# Patient Record
Sex: FEMALE | Race: WHITE | NOT HISPANIC OR LATINO | URBAN - METROPOLITAN AREA
[De-identification: names, ages, dates, MRNs, and addresses within clinical notes are randomized per-mention and may not be internally consistent; named-entity substitution may affect disease eponyms.]

---

## 2019-06-07 ENCOUNTER — APPOINTMENT (RX ONLY)
Dept: URBAN - METROPOLITAN AREA OTHER 8 | Facility: OTHER | Age: 84
Setting detail: DERMATOLOGY
End: 2019-06-07

## 2019-06-07 DIAGNOSIS — D69.2 OTHER NONTHROMBOCYTOPENIC PURPURA: ICD-10-CM

## 2019-06-07 DIAGNOSIS — L90.5 SCAR CONDITIONS AND FIBROSIS OF SKIN: ICD-10-CM

## 2019-06-07 DIAGNOSIS — L57.0 ACTINIC KERATOSIS: ICD-10-CM

## 2019-06-07 DIAGNOSIS — L82.1 OTHER SEBORRHEIC KERATOSIS: ICD-10-CM

## 2019-06-07 DIAGNOSIS — D485 NEOPLASM OF UNCERTAIN BEHAVIOR OF SKIN: ICD-10-CM

## 2019-06-07 PROBLEM — L85.3 XEROSIS CUTIS: Status: ACTIVE | Noted: 2019-06-07

## 2019-06-07 PROBLEM — J45.909 UNSPECIFIED ASTHMA, UNCOMPLICATED: Status: ACTIVE | Noted: 2019-06-07

## 2019-06-07 PROBLEM — R23.3 SPONTANEOUS ECCHYMOSES: Status: ACTIVE | Noted: 2019-06-07

## 2019-06-07 PROBLEM — K21.9 GASTRO-ESOPHAGEAL REFLUX DISEASE WITHOUT ESOPHAGITIS: Status: ACTIVE | Noted: 2019-06-07

## 2019-06-07 PROBLEM — D48.5 NEOPLASM OF UNCERTAIN BEHAVIOR OF SKIN: Status: ACTIVE | Noted: 2019-06-07

## 2019-06-07 PROBLEM — E13.9 OTHER SPECIFIED DIABETES MELLITUS WITHOUT COMPLICATIONS: Status: ACTIVE | Noted: 2019-06-07

## 2019-06-07 PROCEDURE — 11102 TANGNTL BX SKIN SINGLE LES: CPT

## 2019-06-07 PROCEDURE — 99202 OFFICE O/P NEW SF 15 MIN: CPT | Mod: 25

## 2019-06-07 PROCEDURE — ? COUNSELING

## 2019-06-07 PROCEDURE — ? TREATMENT REGIMEN

## 2019-06-07 PROCEDURE — ? ADDITIONAL NOTES

## 2019-06-07 PROCEDURE — 11103 TANGNTL BX SKIN EA SEP/ADDL: CPT

## 2019-06-07 PROCEDURE — ? BIOPSY BY SHAVE METHOD

## 2019-06-07 ASSESSMENT — LOCATION SIMPLE DESCRIPTION DERM
LOCATION SIMPLE: LEFT FOREARM
LOCATION SIMPLE: LEFT UPPER ARM
LOCATION SIMPLE: NOSE
LOCATION SIMPLE: RIGHT FOREARM
LOCATION SIMPLE: CHEST

## 2019-06-07 ASSESSMENT — LOCATION DETAILED DESCRIPTION DERM
LOCATION DETAILED: RIGHT VENTRAL PROXIMAL FOREARM
LOCATION DETAILED: LEFT ANTERIOR PROXIMAL UPPER ARM
LOCATION DETAILED: STERNAL NOTCH
LOCATION DETAILED: NASAL ROOT
LOCATION DETAILED: LEFT DISTAL DORSAL FOREARM
LOCATION DETAILED: LEFT PROXIMAL DORSAL FOREARM

## 2019-06-07 ASSESSMENT — LOCATION ZONE DERM
LOCATION ZONE: TRUNK
LOCATION ZONE: NOSE
LOCATION ZONE: ARM

## 2019-06-07 NOTE — PROCEDURE: BIOPSY BY SHAVE METHOD
Bill 80197 For Specimen Handling/Conveyance To Laboratory?: no
Anesthesia Type: 1% lidocaine with epinephrine
Detail Level: Simple
Silver Nitrate Text: The wound bed was treated with silver nitrate after the biopsy was performed.
Curettage Text: The wound bed was treated with curettage after the biopsy was performed.
Additional Anesthesia Volume In Cc (Will Not Render If 0): 0
Was A Bandage Applied: Yes
Billing Type: Third-Party Bill
Post-Care Instructions: I reviewed with the patient in detail post-care instructions. Patient is to keep the biopsy site dry overnight, and then apply bacitracin twice daily until healed. Patient may apply hydrogen peroxide soaks to remove any crusting.
Wound Care: Vaseline
Cryotherapy Text: The wound bed was treated with cryotherapy after the biopsy was performed.
Biopsy Method: 15 blade
Lab: [id number]
Anesthesia Volume In Cc: 0.5
Notification Instructions: Patient will be notified of biopsy results. However, patient instructed to call the office if not contacted within 2 weeks.
Consent: Written consent was obtained and risks were reviewed including but not limited to scarring, infection, bleeding, scabbing, incomplete removal, nerve damage and allergy to anesthesia.
Lab Facility: [id number]
Size Of Lesion In Cm: 0.7
Electrodesiccation Text: The wound bed was treated with electrodesiccation after the biopsy was performed.
Type Of Destruction Used: Curettage
Depth Of Biopsy: dermis
Dressing: Band-Aid
Body Location Override (Optional - Billing Will Still Be Based On Selected Body Map Location If Applicable): left upper arm
Electrodesiccation And Curettage Text: The wound bed was treated with electrodesiccation and curettage after the biopsy was performed.
Hemostasis: Electrocautery
Biopsy Type: H and E
Path Notes Override (Will Replace All Of The Above Text): 2 areas biopsy in B LESION
Size Of Lesion In Cm: 1
Body Location Override (Optional - Billing Will Still Be Based On Selected Body Map Location If Applicable): left upper nose

## 2019-07-24 ENCOUNTER — APPOINTMENT (RX ONLY)
Dept: URBAN - METROPOLITAN AREA OTHER 8 | Facility: OTHER | Age: 84
Setting detail: DERMATOLOGY
End: 2019-07-24

## 2019-07-24 PROBLEM — D04.62 CARCINOMA IN SITU OF SKIN OF LEFT UPPER LIMB, INCLUDING SHOULDER: Status: ACTIVE | Noted: 2019-07-24

## 2019-07-24 PROCEDURE — 17262 DSTRJ MAL LES T/A/L 1.1-2.0: CPT

## 2019-07-24 PROCEDURE — ? CURETTAGE AND DESTRUCTION

## 2019-07-24 NOTE — PROCEDURE: CURETTAGE AND DESTRUCTION
Size Of Lesion After Curettage: 1.6
Biopsy Photograph Reviewed: Yes
Post-Care Instructions: I reviewed with the patient in detail post-care instructions. Patient is to keep the area dry for 48 hours, and not to engage in any swimming until the area is healed. Should the patient develop any fevers, chills, bleeding, severe pain patient will contact the office immediately.
Body Location Override (Optional - Billing Will Still Be Based On Selected Body Map Location If Applicable): left upper arm
Hide Accession Number?: No
Number Of Curettages: 3
Total Volume (Ccs): 1
Cautery Type: electrodesiccation
Bill As A Line Item Or As Units: Line Item
Detail Level: Detailed
Anesthesia Type: 1% lidocaine with 1:400,000 epinephrine
Size Of Lesion In Cm: 0.7
Consent was obtained from the patient. The risks, benefits and alternatives to therapy were discussed in detail. Specifically, the risks of infection, scarring, bleeding, prolonged wound healing, nerve injury, incomplete removal, allergy to anesthesia and recurrence were addressed. Alternatives to ED&C, such as: surgical removal and XRT were also discussed.  Prior to the procedure, the treatment site was clearly identified and confirmed by the patient. All components of Universal Protocol/PAUSE Rule completed.
Additional Information: (Optional): The wound was cleaned, and a pressure dressing was applied.  The patient received detailed post-op instructions.
What Was Performed First?: Curettage

## 2019-09-04 ENCOUNTER — APPOINTMENT (RX ONLY)
Dept: URBAN - METROPOLITAN AREA OTHER 8 | Facility: OTHER | Age: 84
Setting detail: DERMATOLOGY
End: 2019-09-04

## 2019-09-04 DIAGNOSIS — Z71.89 OTHER SPECIFIED COUNSELING: ICD-10-CM

## 2019-09-04 DIAGNOSIS — L85.3 XEROSIS CUTIS: ICD-10-CM

## 2019-09-04 DIAGNOSIS — L90.5 SCAR CONDITIONS AND FIBROSIS OF SKIN: ICD-10-CM | Status: RESOLVING

## 2019-09-04 DIAGNOSIS — L57.0 ACTINIC KERATOSIS: ICD-10-CM

## 2019-09-04 DIAGNOSIS — L82.1 OTHER SEBORRHEIC KERATOSIS: ICD-10-CM

## 2019-09-04 DIAGNOSIS — D69.2 OTHER NONTHROMBOCYTOPENIC PURPURA: ICD-10-CM

## 2019-09-04 PROCEDURE — ? LIQUID NITROGEN

## 2019-09-04 PROCEDURE — ? COUNSELING

## 2019-09-04 PROCEDURE — 99214 OFFICE O/P EST MOD 30 MIN: CPT | Mod: 25

## 2019-09-04 PROCEDURE — 17003 DESTRUCT PREMALG LES 2-14: CPT

## 2019-09-04 PROCEDURE — ? ADDITIONAL NOTES

## 2019-09-04 PROCEDURE — 17000 DESTRUCT PREMALG LESION: CPT

## 2019-09-04 PROCEDURE — ? PRESCRIPTION

## 2019-09-04 RX ORDER — FLUOROURACIL 50 MG/G
CREAM TOPICAL TWICE A DAY
Qty: 1 | Refills: 1 | Status: ERX | COMMUNITY
Start: 2019-09-04

## 2019-09-04 RX ADMIN — FLUOROURACIL: 50 CREAM TOPICAL at 00:00

## 2019-09-04 ASSESSMENT — LOCATION SIMPLE DESCRIPTION DERM
LOCATION SIMPLE: NOSE
LOCATION SIMPLE: CHEST
LOCATION SIMPLE: LEFT THIGH
LOCATION SIMPLE: RIGHT THIGH
LOCATION SIMPLE: LEFT UPPER BACK
LOCATION SIMPLE: RIGHT CALF
LOCATION SIMPLE: LEFT CHEEK
LOCATION SIMPLE: RIGHT PRETIBIAL REGION
LOCATION SIMPLE: LEFT CALF
LOCATION SIMPLE: LEFT PRETIBIAL REGION
LOCATION SIMPLE: LEFT POSTERIOR UPPER ARM

## 2019-09-04 ASSESSMENT — LOCATION ZONE DERM
LOCATION ZONE: LEG
LOCATION ZONE: TRUNK
LOCATION ZONE: ARM
LOCATION ZONE: NOSE
LOCATION ZONE: FACE

## 2019-09-04 ASSESSMENT — LOCATION DETAILED DESCRIPTION DERM
LOCATION DETAILED: RIGHT PROXIMAL CALF
LOCATION DETAILED: LEFT DISTAL CALF
LOCATION DETAILED: RIGHT ANTERIOR DISTAL THIGH
LOCATION DETAILED: LEFT INFERIOR UPPER BACK
LOCATION DETAILED: NASAL DORSUM
LOCATION DETAILED: MIDDLE STERNUM
LOCATION DETAILED: LEFT ANTERIOR DISTAL THIGH
LOCATION DETAILED: RIGHT PROXIMAL PRETIBIAL REGION
LOCATION DETAILED: LEFT PROXIMAL POSTERIOR UPPER ARM
LOCATION DETAILED: LEFT ANTERIOR PROXIMAL THIGH
LOCATION DETAILED: LEFT DISTAL PRETIBIAL REGION
LOCATION DETAILED: LEFT LATERAL BUCCAL CHEEK

## 2019-09-04 ASSESSMENT — PAIN INTENSITY VAS: HOW INTENSE IS YOUR PAIN 0 BEING NO PAIN, 10 BEING THE MOST SEVERE PAIN POSSIBLE?: NO PAIN

## 2019-09-04 NOTE — PROCEDURE: ADDITIONAL NOTES
Additional Notes: Pt to wait until after frozen spots heal.
Detail Level: Detailed
Additional Notes: Pt to wash site, apply Vaseline and cover site.

## 2019-09-04 NOTE — PROCEDURE: LIQUID NITROGEN
Number Of Freeze-Thaw Cycles: 2 freeze-thaw cycles
Post-Care Instructions: I reviewed with the patient in detail post-care instructions. Patient is to wear sunprotection, and avoid picking at any of the treated lesions. Pt may apply Vaseline to crusted or scabbing areas.
Render Note In Bullet Format When Appropriate: No
Detail Level: Simple
Duration Of Freeze Thaw-Cycle (Seconds): 10
Consent: The patient's consent was obtained including but not limited to risks of crusting, scabbing, blistering, scarring, darker or lighter pigmentary change, recurrence, incomplete removal and infection.

## 2020-10-28 ENCOUNTER — APPOINTMENT (RX ONLY)
Dept: URBAN - METROPOLITAN AREA OTHER 8 | Facility: OTHER | Age: 85
Setting detail: DERMATOLOGY
End: 2020-10-28

## 2020-10-28 VITALS — TEMPERATURE: 97.34 F

## 2020-10-28 DIAGNOSIS — L90.5 SCAR CONDITIONS AND FIBROSIS OF SKIN: ICD-10-CM

## 2020-10-28 DIAGNOSIS — D22 MELANOCYTIC NEVI: ICD-10-CM

## 2020-10-28 DIAGNOSIS — D485 NEOPLASM OF UNCERTAIN BEHAVIOR OF SKIN: ICD-10-CM

## 2020-10-28 DIAGNOSIS — L57.0 ACTINIC KERATOSIS: ICD-10-CM

## 2020-10-28 DIAGNOSIS — L82.1 OTHER SEBORRHEIC KERATOSIS: ICD-10-CM

## 2020-10-28 DIAGNOSIS — D69.2 OTHER NONTHROMBOCYTOPENIC PURPURA: ICD-10-CM

## 2020-10-28 PROBLEM — D22.5 MELANOCYTIC NEVI OF TRUNK: Status: ACTIVE | Noted: 2020-10-28

## 2020-10-28 PROBLEM — D48.5 NEOPLASM OF UNCERTAIN BEHAVIOR OF SKIN: Status: ACTIVE | Noted: 2020-10-28

## 2020-10-28 PROCEDURE — 17000 DESTRUCT PREMALG LESION: CPT | Mod: 59

## 2020-10-28 PROCEDURE — ? BIOPSY BY SHAVE METHOD

## 2020-10-28 PROCEDURE — 17003 DESTRUCT PREMALG LES 2-14: CPT

## 2020-10-28 PROCEDURE — ? LIQUID NITROGEN

## 2020-10-28 PROCEDURE — ? COUNSELING

## 2020-10-28 PROCEDURE — 11102 TANGNTL BX SKIN SINGLE LES: CPT

## 2020-10-28 PROCEDURE — 99214 OFFICE O/P EST MOD 30 MIN: CPT | Mod: 25

## 2020-10-28 ASSESSMENT — LOCATION DETAILED DESCRIPTION DERM
LOCATION DETAILED: RIGHT ANTERIOR DISTAL THIGH
LOCATION DETAILED: RIGHT ANTERIOR DISTAL UPPER ARM
LOCATION DETAILED: LEFT ANTERIOR PROXIMAL UPPER ARM
LOCATION DETAILED: LEFT PROXIMAL PRETIBIAL REGION
LOCATION DETAILED: RIGHT MEDIAL SUPERIOR CHEST
LOCATION DETAILED: LEFT PROXIMAL DORSAL FOREARM
LOCATION DETAILED: RIGHT LATERAL MALAR CHEEK
LOCATION DETAILED: RIGHT ANTERIOR PROXIMAL THIGH
LOCATION DETAILED: RIGHT INFERIOR LATERAL FOREHEAD
LOCATION DETAILED: LEFT VENTRAL PROXIMAL FOREARM
LOCATION DETAILED: LEFT INFERIOR UPPER BACK
LOCATION DETAILED: LEFT LATERAL SUPERIOR CHEST
LOCATION DETAILED: RIGHT PROXIMAL PRETIBIAL REGION
LOCATION DETAILED: NASAL DORSUM
LOCATION DETAILED: RIGHT MEDIAL UPPER BACK
LOCATION DETAILED: RIGHT PROXIMAL DORSAL FOREARM

## 2020-10-28 ASSESSMENT — LOCATION SIMPLE DESCRIPTION DERM
LOCATION SIMPLE: LEFT UPPER ARM
LOCATION SIMPLE: CHEST
LOCATION SIMPLE: RIGHT PRETIBIAL REGION
LOCATION SIMPLE: LEFT UPPER BACK
LOCATION SIMPLE: RIGHT FOREHEAD
LOCATION SIMPLE: RIGHT UPPER BACK
LOCATION SIMPLE: LEFT PRETIBIAL REGION
LOCATION SIMPLE: RIGHT UPPER ARM
LOCATION SIMPLE: RIGHT THIGH
LOCATION SIMPLE: RIGHT CHEEK
LOCATION SIMPLE: LEFT FOREARM
LOCATION SIMPLE: RIGHT FOREARM
LOCATION SIMPLE: NOSE

## 2020-10-28 ASSESSMENT — LOCATION ZONE DERM
LOCATION ZONE: LEG
LOCATION ZONE: ARM
LOCATION ZONE: FACE
LOCATION ZONE: TRUNK
LOCATION ZONE: NOSE

## 2020-10-28 NOTE — PROCEDURE: LIQUID NITROGEN
Number Of Freeze-Thaw Cycles: 1 freeze-thaw cycle
Post-Care Instructions: I reviewed with the patient in detail post-care instructions. Patient is to wear sunprotection, and avoid picking at any of the treated lesions. Pt may apply Vaseline to crusted or scabbing areas.
Render Note In Bullet Format When Appropriate: No
Duration Of Freeze Thaw-Cycle (Seconds): 15
Consent: The patient's consent was obtained including but not limited to risks of crusting, scabbing, blistering, scarring, darker or lighter pigmentary change, recurrence, incomplete removal and infection.
Detail Level: Detailed

## 2020-10-28 NOTE — PROCEDURE: BIOPSY BY SHAVE METHOD
Detail Level: Detailed
Depth Of Biopsy: dermis
Was A Bandage Applied: Yes
Size Of Lesion In Cm: 0
Biopsy Type: H and E
Biopsy Method: 15 blade
Anesthesia Type: 1% lidocaine with epinephrine
Anesthesia Volume In Cc: 1
Hemostasis: Electrocautery
Wound Care: Vaseline
Dressing: bandage
Destruction After The Procedure: No
Type Of Destruction Used: Curettage
Curettage Text: The wound bed was treated with curettage after the biopsy was performed.
Cryotherapy Text: The wound bed was treated with cryotherapy after the biopsy was performed.
Electrodesiccation Text: The wound bed was treated with electrodesiccation after the biopsy was performed.
Electrodesiccation And Curettage Text: The wound bed was treated with electrodesiccation and curettage after the biopsy was performed.
Silver Nitrate Text: The wound bed was treated with silver nitrate after the biopsy was performed.
Lab: [id number]
Lab Facility: [id number]
Consent: Written consent was obtained and risks were reviewed including but not limited to scarring, infection, bleeding, scabbing, incomplete removal, nerve damage and allergy to anesthesia.
Post-Care Instructions: I reviewed with the patient in detail post-care instructions. Patient is to keep the biopsy site dry overnight, and then apply bacitracin twice daily until healed. Patient may apply hydrogen peroxide soaks to remove any crusting.
Notification Instructions: Patient will be notified of biopsy results. However, patient instructed to call the office if not contacted within 2 weeks.
Billing Type: Third-Party Bill
Information: Selecting Yes will display possible errors in your note based on the variables you have selected. This validation is only offered as a suggestion for you. PLEASE NOTE THAT THE VALIDATION TEXT WILL BE REMOVED WHEN YOU FINALIZE YOUR NOTE. IF YOU WANT TO FAX A PRELIMINARY NOTE YOU WILL NEED TO TOGGLE THIS TO 'NO' IF YOU DO NOT WANT IT IN YOUR FAXED NOTE.

## 2020-12-01 ENCOUNTER — APPOINTMENT (RX ONLY)
Dept: URBAN - METROPOLITAN AREA OTHER 8 | Facility: OTHER | Age: 85
Setting detail: DERMATOLOGY
End: 2020-12-01

## 2020-12-01 VITALS — TEMPERATURE: 97.1 F

## 2020-12-01 PROBLEM — C44.729 SQUAMOUS CELL CARCINOMA OF SKIN OF LEFT LOWER LIMB, INCLUDING HIP: Status: ACTIVE | Noted: 2020-12-01

## 2020-12-01 PROCEDURE — ? PRESCRIPTION

## 2020-12-01 PROCEDURE — 17313 MOHS 1 STAGE T/A/L: CPT

## 2020-12-01 PROCEDURE — ? MOHS SURGERY

## 2020-12-01 PROCEDURE — 12032 INTMD RPR S/A/T/EXT 2.6-7.5: CPT

## 2020-12-01 RX ORDER — DOXYCYCLINE HYCLATE 100 MG/1
TABLET, COATED ORAL
Qty: 28 | Refills: 0 | Status: ERX | COMMUNITY
Start: 2020-12-01

## 2020-12-01 RX ADMIN — DOXYCYCLINE HYCLATE: 100 TABLET, COATED ORAL at 00:00

## 2020-12-01 NOTE — PROCEDURE: MOHS SURGERY
Mohs Case Number: 
Date Of Previous Biopsy (Optional): 10/28/20
Previous Accession (Optional): [id number]
Biopsy Photograph Reviewed: Yes
Consent Type: Consent 1 (Standard)
Eye Shield Used: No
Surgeon Performing Repair (Optional): Dr. Herron
Initial Size Of Lesion: 1.6
X Size Of Lesion In Cm (Optional): 1.7
Number Of Stages: 1
Primary Defect Length In Cm (Final Defect Size - Required For Flaps/Grafts): 3
Primary Defect Width In Cm (Final Defect Size - Required For Flaps/Grafts): 2.5
Repair Type: Intermediate Layered Repair
Oculoplastic Surgeon Procedure Text (A): After obtaining clear surgical margins the patient was sent to oculoplastics for surgical repair.  The patient understands they will receive post-surgical care and follow-up from the referring physician's office.
Otolaryngologist Procedure Text (A): After obtaining clear surgical margins the patient was sent to otolaryngology for surgical repair.  The patient understands they will receive post-surgical care and follow-up from the referring physician's office.
Plastic Surgeon Procedure Text (A): After obtaining clear surgical margins the patient was sent to plastics for surgical repair.  The patient understands they will receive post-surgical care and follow-up from the referring physician's office.
Mid-Level Procedure Text (A): After obtaining clear surgical margins the patient was sent to a mid-level provider for surgical repair.  The patient understands they will receive post-surgical care and follow-up from the mid-level provider.
Provider Procedure Text (A): After obtaining clear surgical margins the defect was repaired by another provider.
Asc Procedure Text (A): After obtaining clear surgical margins the patient was sent to an ASC for surgical repair.  The patient understands they will receive post-surgical care and follow-up from the ASC physician.
Suturegard Retention Suture: 2-0 Nylon
Retention Suture Bite Size: 3 mm
Length To Time In Minutes Device Was In Place: 10
Simple / Intermediate / Complex Repair - Final Wound Length In Cm: 6.3
Undermining Type: Entire Wound
Debridement Text: The wound edges were debrided prior to proceeding with the closure to facilitate wound healing.
Helical Rim Text: The closure involved the helical rim.
Vermilion Border Text: The closure involved the vermilion border.
Nostril Rim Text: The closure involved the nostril rim.
Retention Suture Text: Retention sutures were placed to support the closure and prevent dehiscence.
Secondary Defect Length In Cm (Required For Flaps): 0
Area H Indication Text: Tumors in this location are included in Area H (eyelids, eyebrows, nose, lips, chin, ear, pre-auricular, post-auricular, temple, genitalia, hands, feet, ankles and areola).  Tissue conservation is critical in these anatomic locations.
Area M Indication Text: Tumors in this location are included in Area M (cheek, forehead, scalp, neck, jawline and pretibial skin).  Mohs surgery is indicated for tumors in these anatomic locations.
Area L Indication Text: Tumors in this location are included in Area L (trunk and extremities).  Mohs surgery is indicated for larger tumors, or tumors with aggressive histologic features, in these anatomic locations.
Tumor Debulked?: scalpel
Stage 1: Number Of Blocks?: 2
Special Stains Stage 1 - Results: Base On Clearance Noted Above
Stage 2: Additional Anesthesia Type: 1% lidocaine with epinephrine
Staging Info: By selecting yes to the question above you will include information on AJCC 8 tumor staging in your Mohs note. Information on tumor staging will be automatically added for SCCs on the head and neck. AJCC 8 includes tumor size, tumor depth, perineural involvement and bone invasion.
Tumor Depth: Less than 6mm from granular layer and no invasion beyond the subcutaneous fat
Was The Patient On Physician Recommended Anticoagulation Therapy?: Please Select the Appropriate Response
Medical Necessity Statement: Based on my medical judgement, Mohs surgery is the most appropriate treatment for this cancer compared to other treatments.
Alternatives Discussed Intro (Do Not Add Period): I discussed alternative treatments to Mohs surgery and specifically discussed the risks and benefits of
Consent 1/Introductory Paragraph: The rationale for Mohs was explained to the patient and consent was obtained. The risks, benefits and alternatives to therapy were discussed in detail. Specifically, the risks of infection, scarring, bleeding, prolonged wound healing, incomplete removal, allergy to anesthesia, nerve injury and recurrence were addressed. Prior to the procedure, the treatment site was clearly identified and confirmed by the patient. All components of Universal Protocol/PAUSE Rule completed.
Consent 2/Introductory Paragraph: Mohs surgery was explained to the patient and consent was obtained. The risks, benefits and alternatives to therapy were discussed in detail. Specifically, the risks of infection, scarring, bleeding, prolonged wound healing, incomplete removal, allergy to anesthesia, nerve injury and recurrence were addressed. Prior to the procedure, the treatment site was clearly identified and confirmed by the patient. All components of Universal Protocol/PAUSE Rule completed.
Consent 3/Introductory Paragraph: I gave the patient a chance to ask questions they had about the procedure.  Following this I explained the Mohs procedure and consent was obtained. The risks, benefits and alternatives to therapy were discussed in detail. Specifically, the risks of infection, scarring, bleeding, prolonged wound healing, incomplete removal, allergy to anesthesia, nerve injury and recurrence were addressed. Prior to the procedure, the treatment site was clearly identified and confirmed by the patient. All components of Universal Protocol/PAUSE Rule completed.
Consent (Temporal Branch)/Introductory Paragraph: The rationale for Mohs was explained to the patient and consent was obtained. The risks, benefits and alternatives to therapy were discussed in detail. Specifically, the risks of damage to the temporal branch of the facial nerve, infection, scarring, bleeding, prolonged wound healing, incomplete removal, allergy to anesthesia, and recurrence were addressed. Prior to the procedure, the treatment site was clearly identified and confirmed by the patient. All components of Universal Protocol/PAUSE Rule completed.
Consent (Marginal Mandibular)/Introductory Paragraph: The rationale for Mohs was explained to the patient and consent was obtained. The risks, benefits and alternatives to therapy were discussed in detail. Specifically, the risks of damage to the marginal mandibular branch of the facial nerve, infection, scarring, bleeding, prolonged wound healing, incomplete removal, allergy to anesthesia, and recurrence were addressed. Prior to the procedure, the treatment site was clearly identified and confirmed by the patient. All components of Universal Protocol/PAUSE Rule completed.
Consent (Spinal Accessory)/Introductory Paragraph: The rationale for Mohs was explained to the patient and consent was obtained. The risks, benefits and alternatives to therapy were discussed in detail. Specifically, the risks of damage to the spinal accessory nerve, infection, scarring, bleeding, prolonged wound healing, incomplete removal, allergy to anesthesia, and recurrence were addressed. Prior to the procedure, the treatment site was clearly identified and confirmed by the patient. All components of Universal Protocol/PAUSE Rule completed.
Consent (Near Eyelid Margin)/Introductory Paragraph: The rationale for Mohs was explained to the patient and consent was obtained. The risks, benefits and alternatives to therapy were discussed in detail. Specifically, the risks of ectropion or eyelid deformity, infection, scarring, bleeding, prolonged wound healing, incomplete removal, allergy to anesthesia, nerve injury and recurrence were addressed. Prior to the procedure, the treatment site was clearly identified and confirmed by the patient. All components of Universal Protocol/PAUSE Rule completed.
Consent (Ear)/Introductory Paragraph: The rationale for Mohs was explained to the patient and consent was obtained. The risks, benefits and alternatives to therapy were discussed in detail. Specifically, the risks of ear deformity, infection, scarring, bleeding, prolonged wound healing, incomplete removal, allergy to anesthesia, nerve injury and recurrence were addressed. Prior to the procedure, the treatment site was clearly identified and confirmed by the patient. All components of Universal Protocol/PAUSE Rule completed.
Consent (Nose)/Introductory Paragraph: The rationale for Mohs was explained to the patient and consent was obtained. The risks, benefits and alternatives to therapy were discussed in detail. Specifically, the risks of nasal deformity, changes in the flow of air through the nose, infection, scarring, bleeding, prolonged wound healing, incomplete removal, allergy to anesthesia, nerve injury and recurrence were addressed. Prior to the procedure, the treatment site was clearly identified and confirmed by the patient. All components of Universal Protocol/PAUSE Rule completed.
Consent (Lip)/Introductory Paragraph: The rationale for Mohs was explained to the patient and consent was obtained. The risks, benefits and alternatives to therapy were discussed in detail. Specifically, the risks of lip deformity, changes in the oral aperture, infection, scarring, bleeding, prolonged wound healing, incomplete removal, allergy to anesthesia, nerve injury and recurrence were addressed. Prior to the procedure, the treatment site was clearly identified and confirmed by the patient. All components of Universal Protocol/PAUSE Rule completed.
Consent (Scalp)/Introductory Paragraph: The rationale for Mohs was explained to the patient and consent was obtained. The risks, benefits and alternatives to therapy were discussed in detail. Specifically, the risks of changes in hair growth pattern secondary to repair, infection, scarring, bleeding, prolonged wound healing, incomplete removal, allergy to anesthesia, nerve injury and recurrence were addressed. Prior to the procedure, the treatment site was clearly identified and confirmed by the patient. All components of Universal Protocol/PAUSE Rule completed.
Detail Level: Detailed
Postop Diagnosis: same
Hemostasis: Electrodesiccation
Estimated Blood Loss (Cc): minimal
Repair Anesthesia Method: local infiltration
Deep Sutures: 4-0 Vicryl
Epidermal Sutures: 4-0 Ethilon
Epidermal Closure: simple interrupted
Suturegard Intro: Intraoperative tissue expansion was performed, utilizing the SUTUREGARD device, in order to reduce wound tension.
Suturegard Body: The suture ends were repeatedly re-tightened and re-clamped to achieve the desired tissue expansion.
Hemigard Intro: Due to skin fragility and wound tension, it was decided to use HEMIGARD adhesive retention suture devices to permit a linear closure. The skin was cleaned and dried for a 6cm distance away from the wound. Excessive hair, if present, was removed to allow for adhesion.
Hemigard Postcare Instructions: The HEMIGARD strips are to remain completely dry for at least 5-7 days.
Donor Site Anesthesia Type: same as repair anesthesia
Graft Donor Site Bandage (Optional-Leave Blank If You Don't Want In Note): Steri-strips and a pressure bandage were applied to the donor site.
Closure 2 Information: This tab is for additional flaps and grafts, including complex repair and grafts and complex repair and flaps. You can also specify a different location for the additional defect, if the location is the same you do not need to select a new one. We will insert the automated text for the repair you select below just as we do for solitary flaps and grafts. Please note that at this time if you select a location with a different insurance zone you will need to override the ICD10 and CPT if appropriate.
Closure 3 Information: This tab is for additional flaps and grafts above and beyond our usual structured repairs.  Please note if you enter information here it will not currently bill and you will need to add the billing information manually.
Wound Care: Petrolatum
Dressing: pressure dressing with telfa
Suture Removal: 14 days
Unna Boot Text: An Unna boot was placed to help immobilize the limb and facilitate more rapid healing.
Home Suture Removal Text: Patient was provided instructions on removing sutures and will remove their sutures at home.  If they have any questions or difficulties they will call the office.
Post-Care Instructions: I reviewed with the patient in detail post-care instructions. Patient is not to engage in any heavy lifting, exercise, or swimming for the next 14 days. Should the patient develop any fevers, chills, bleeding, severe pain patient will contact the office immediately.
Pain Refusal Text: I offered to prescribe pain medication but the patient refused to take this medication.
Mauc Instructions: By selecting yes to the question below the MAUC number will be added into the note.  This will be calculated automatically based on the diagnosis chosen, the size entered, the body zone selected (H,M,L) and the specific indications you chose. You will also have the option to override the Mohs AUC if you disagree with the automatically calculated number and this option is found in the Case Summary tab.
Where Do You Want The Question To Include Opioid Counseling Located?: Case Summary Tab
Eye Protection Verbiage: Before proceeding with the stage, a plastic scleral shield was inserted. The globe was anesthetized with a few drops of 1% lidocaine with 1:100,000 epinephrine. Then, an appropriate sized scleral shield was chosen and coated with lacrilube ointment. The shield was gently inserted and left in place for the duration of each stage. After the stage was completed, the shield was gently removed.
Mohs Method Verbiage: An incision at a 45 degree angle following the standard Mohs approach was done and the specimen was harvested as a microscopic controlled layer.
Surgeon/Pathologist Verbiage (Will Incorporate Name Of Surgeon From Intro If Not Blank): operated in two distinct and integrated capacities as the surgeon and pathologist.
Mohs Histo Method Verbiage: Each section was then chromacoded and processed in the Mohs lab using the Mohs protocol and submitted for frozen section.
Subsequent Stages Histo Method Verbiage: Using a similar technique to that described above, a thin layer of tissue was removed from all areas where tumor was visible on the previous stage.  The tissue was again oriented, mapped, dyed, and processed as above.
Mohs Rapid Report Verbiage: The area of clinically evident tumor was marked with skin marking ink and appropriately hatched.  The initial incision was made following the Mohs approach through the skin.  The specimen was taken to the lab, divided into the necessary number of pieces, chromacoded and processed according to the Mohs protocol.  This was repeated in successive stages until a tumor free defect was achieved.
Complex Repair Preamble Text (Leave Blank If You Do Not Want): Extensive wide undermining was performed.
Intermediate Repair Preamble Text (Leave Blank If You Do Not Want): Undermining was performed with blunt dissection.
Non-Graft Cartilage Fenestration Text: The cartilage was fenestrated with a 2mm punch biopsy to help facilitate healing.
Graft Cartilage Fenestration Text: The cartilage was fenestrated with a 2mm punch biopsy to help facilitate graft survival and healing.
Secondary Intention Text (Leave Blank If You Do Not Want): The defect will heal with secondary intention.
No Repair - Repaired With Adjacent Surgical Defect Text (Leave Blank If You Do Not Want): After obtaining clear surgical margins the defect was repaired concurrently with another surgical defect which was in close approximation.
Advancement Flap (Single) Text: The defect edges were debeveled with a #15 scalpel blade.  Given the location of the defect and the proximity to free margins a single advancement flap was deemed most appropriate.  Using a sterile surgical marker, an appropriate advancement flap was drawn incorporating the defect and placing the expected incisions within the relaxed skin tension lines where possible.    The area thus outlined was incised deep to adipose tissue with a #15 scalpel blade.  The skin margins were undermined to an appropriate distance in all directions utilizing iris scissors.
Advancement Flap (Double) Text: The defect edges were debeveled with a #15 scalpel blade.  Given the location of the defect and the proximity to free margins a double advancement flap was deemed most appropriate.  Using a sterile surgical marker, the appropriate advancement flaps were drawn incorporating the defect and placing the expected incisions within the relaxed skin tension lines where possible.    The area thus outlined was incised deep to adipose tissue with a #15 scalpel blade.  The skin margins were undermined to an appropriate distance in all directions utilizing iris scissors.
Burow's Advancement Flap Text: The defect edges were debeveled with a #15 scalpel blade.  Given the location of the defect and the proximity to free margins a Burow's advancement flap was deemed most appropriate.  Using a sterile surgical marker, the appropriate advancement flap was drawn incorporating the defect and placing the expected incisions within the relaxed skin tension lines where possible.    The area thus outlined was incised deep to adipose tissue with a #15 scalpel blade.  The skin margins were undermined to an appropriate distance in all directions utilizing iris scissors.
Chonodrocutaneous Helical Advancement Flap Text: The defect edges were debeveled with a #15 scalpel blade.  Given the location of the defect and the proximity to free margins a chondrocutaneous helical advancement flap was deemed most appropriate.  Using a sterile surgical marker, the appropriate advancement flap was drawn incorporating the defect and placing the expected incisions within the relaxed skin tension lines where possible.    The area thus outlined was incised deep to adipose tissue with a #15 scalpel blade.  The skin margins were undermined to an appropriate distance in all directions utilizing iris scissors.
Crescentic Advancement Flap Text: The defect edges were debeveled with a #15 scalpel blade.  Given the location of the defect and the proximity to free margins a crescentic advancement flap was deemed most appropriate.  Using a sterile surgical marker, the appropriate advancement flap was drawn incorporating the defect and placing the expected incisions within the relaxed skin tension lines where possible.    The area thus outlined was incised deep to adipose tissue with a #15 scalpel blade.  The skin margins were undermined to an appropriate distance in all directions utilizing iris scissors.
A-T Advancement Flap Text: The defect edges were debeveled with a #15 scalpel blade.  Given the location of the defect, shape of the defect and the proximity to free margins an A-T advancement flap was deemed most appropriate.  Using a sterile surgical marker, an appropriate advancement flap was drawn incorporating the defect and placing the expected incisions within the relaxed skin tension lines where possible.    The area thus outlined was incised deep to adipose tissue with a #15 scalpel blade.  The skin margins were undermined to an appropriate distance in all directions utilizing iris scissors.
O-T Advancement Flap Text: The defect edges were debeveled with a #15 scalpel blade.  Given the location of the defect, shape of the defect and the proximity to free margins an O-T advancement flap was deemed most appropriate.  Using a sterile surgical marker, an appropriate advancement flap was drawn incorporating the defect and placing the expected incisions within the relaxed skin tension lines where possible.    The area thus outlined was incised deep to adipose tissue with a #15 scalpel blade.  The skin margins were undermined to an appropriate distance in all directions utilizing iris scissors.
O-L Flap Text: The defect edges were debeveled with a #15 scalpel blade.  Given the location of the defect, shape of the defect and the proximity to free margins an O-L flap was deemed most appropriate.  Using a sterile surgical marker, an appropriate advancement flap was drawn incorporating the defect and placing the expected incisions within the relaxed skin tension lines where possible.    The area thus outlined was incised deep to adipose tissue with a #15 scalpel blade.  The skin margins were undermined to an appropriate distance in all directions utilizing iris scissors.
O-Z Flap Text: The defect edges were debeveled with a #15 scalpel blade.  Given the location of the defect, shape of the defect and the proximity to free margins an O-Z flap was deemed most appropriate.  Using a sterile surgical marker, an appropriate transposition flap was drawn incorporating the defect and placing the expected incisions within the relaxed skin tension lines where possible. The area thus outlined was incised deep to adipose tissue with a #15 scalpel blade.  The skin margins were undermined to an appropriate distance in all directions utilizing iris scissors.
Double O-Z Flap Text: The defect edges were debeveled with a #15 scalpel blade.  Given the location of the defect, shape of the defect and the proximity to free margins a Double O-Z flap was deemed most appropriate.  Using a sterile surgical marker, an appropriate transposition flap was drawn incorporating the defect and placing the expected incisions within the relaxed skin tension lines where possible. The area thus outlined was incised deep to adipose tissue with a #15 scalpel blade.  The skin margins were undermined to an appropriate distance in all directions utilizing iris scissors.
V-Y Flap Text: The defect edges were debeveled with a #15 scalpel blade.  Given the location of the defect, shape of the defect and the proximity to free margins a V-Y flap was deemed most appropriate.  Using a sterile surgical marker, an appropriate advancement flap was drawn incorporating the defect and placing the expected incisions within the relaxed skin tension lines where possible.    The area thus outlined was incised deep to adipose tissue with a #15 scalpel blade.  The skin margins were undermined to an appropriate distance in all directions utilizing iris scissors.
Advancement-Rotation Flap Text: The defect edges were debeveled with a #15 scalpel blade.  Given the location of the defect, shape of the defect and the proximity to free margins an advancement-rotation flap was deemed most appropriate.  Using a sterile surgical marker, an appropriate flap was drawn incorporating the defect and placing the expected incisions within the relaxed skin tension lines where possible. The area thus outlined was incised deep to adipose tissue with a #15 scalpel blade.  The skin margins were undermined to an appropriate distance in all directions utilizing iris scissors.
Mercedes Flap Text: The defect edges were debeveled with a #15 scalpel blade.  Given the location of the defect, shape of the defect and the proximity to free margins a Mercedes flap was deemed most appropriate.  Using a sterile surgical marker, an appropriate advancement flap was drawn incorporating the defect and placing the expected incisions within the relaxed skin tension lines where possible. The area thus outlined was incised deep to adipose tissue with a #15 scalpel blade.  The skin margins were undermined to an appropriate distance in all directions utilizing iris scissors.
Modified Advancement Flap Text: The defect edges were debeveled with a #15 scalpel blade.  Given the location of the defect, shape of the defect and the proximity to free margins a modified advancement flap was deemed most appropriate.  Using a sterile surgical marker, an appropriate advancement flap was drawn incorporating the defect and placing the expected incisions within the relaxed skin tension lines where possible.    The area thus outlined was incised deep to adipose tissue with a #15 scalpel blade.  The skin margins were undermined to an appropriate distance in all directions utilizing iris scissors.
Mucosal Advancement Flap Text: Given the location of the defect, shape of the defect and the proximity to free margins a mucosal advancement flap was deemed most appropriate. Incisions were made with a 15 blade scalpel in the appropriate fashion along the cutaneous vermilion border and the mucosal lip. The remaining actinically damaged mucosal tissue was excised.  The mucosal advancement flap was then elevated to the gingival sulcus with care taken to preserve the neurovascular structures and advanced into the primary defect. Care was taken to ensure that precise realignment of the vermilion border was achieved.
Peng Advancement Flap Text: The defect edges were debeveled with a #15 scalpel blade.  Given the location of the defect, shape of the defect and the proximity to free margins a Peng advancement flap was deemed most appropriate.  Using a sterile surgical marker, an appropriate advancement flap was drawn incorporating the defect and placing the expected incisions within the relaxed skin tension lines where possible. The area thus outlined was incised deep to adipose tissue with a #15 scalpel blade.  The skin margins were undermined to an appropriate distance in all directions utilizing iris scissors.
Hatchet Flap Text: The defect edges were debeveled with a #15 scalpel blade.  Given the location of the defect, shape of the defect and the proximity to free margins a hatchet flap was deemed most appropriate.  Using a sterile surgical marker, an appropriate hatchet flap was drawn incorporating the defect and placing the expected incisions within the relaxed skin tension lines where possible.    The area thus outlined was incised deep to adipose tissue with a #15 scalpel blade.  The skin margins were undermined to an appropriate distance in all directions utilizing iris scissors.
Rotation Flap Text: The defect edges were debeveled with a #15 scalpel blade.  Given the location of the defect, shape of the defect and the proximity to free margins a rotation flap was deemed most appropriate.  Using a sterile surgical marker, an appropriate rotation flap was drawn incorporating the defect and placing the expected incisions within the relaxed skin tension lines where possible.    The area thus outlined was incised deep to adipose tissue with a #15 scalpel blade.  The skin margins were undermined to an appropriate distance in all directions utilizing iris scissors.
Spiral Flap Text: The defect edges were debeveled with a #15 scalpel blade.  Given the location of the defect, shape of the defect and the proximity to free margins a spiral flap was deemed most appropriate.  Using a sterile surgical marker, an appropriate rotation flap was drawn incorporating the defect and placing the expected incisions within the relaxed skin tension lines where possible. The area thus outlined was incised deep to adipose tissue with a #15 scalpel blade.  The skin margins were undermined to an appropriate distance in all directions utilizing iris scissors.
Star Wedge Flap Text: The defect edges were debeveled with a #15 scalpel blade.  Given the location of the defect, shape of the defect and the proximity to free margins a star wedge flap was deemed most appropriate.  Using a sterile surgical marker, an appropriate rotation flap was drawn incorporating the defect and placing the expected incisions within the relaxed skin tension lines where possible. The area thus outlined was incised deep to adipose tissue with a #15 scalpel blade.  The skin margins were undermined to an appropriate distance in all directions utilizing iris scissors.
Transposition Flap Text: The defect edges were debeveled with a #15 scalpel blade.  Given the location of the defect and the proximity to free margins a transposition flap was deemed most appropriate.  Using a sterile surgical marker, an appropriate transposition flap was drawn incorporating the defect.    The area thus outlined was incised deep to adipose tissue with a #15 scalpel blade.  The skin margins were undermined to an appropriate distance in all directions utilizing iris scissors.
Muscle Hinge Flap Text: The defect edges were debeveled with a #15 scalpel blade.  Given the size, depth and location of the defect and the proximity to free margins a muscle hinge flap was deemed most appropriate.  Using a sterile surgical marker, an appropriate hinge flap was drawn incorporating the defect. The area thus outlined was incised with a #15 scalpel blade.  The skin margins were undermined to an appropriate distance in all directions utilizing iris scissors.
Melolabial Transposition Flap Text: The defect edges were debeveled with a #15 scalpel blade.  Given the location of the defect and the proximity to free margins a melolabial flap was deemed most appropriate.  Using a sterile surgical marker, an appropriate melolabial transposition flap was drawn incorporating the defect.    The area thus outlined was incised deep to adipose tissue with a #15 scalpel blade.  The skin margins were undermined to an appropriate distance in all directions utilizing iris scissors.
Rhombic Flap Text: The defect edges were debeveled with a #15 scalpel blade.  Given the location of the defect and the proximity to free margins a rhombic flap was deemed most appropriate.  Using a sterile surgical marker, an appropriate rhombic flap was drawn incorporating the defect.    The area thus outlined was incised deep to adipose tissue with a #15 scalpel blade.  The skin margins were undermined to an appropriate distance in all directions utilizing iris scissors.
Rhomboid Transposition Flap Text: The defect edges were debeveled with a #15 scalpel blade.  Given the location of the defect and the proximity to free margins a rhomboid transposition flap was deemed most appropriate.  Using a sterile surgical marker, an appropriate rhomboid flap was drawn incorporating the defect.    The area thus outlined was incised deep to adipose tissue with a #15 scalpel blade.  The skin margins were undermined to an appropriate distance in all directions utilizing iris scissors.
Bi-Rhombic Flap Text: The defect edges were debeveled with a #15 scalpel blade.  Given the location of the defect and the proximity to free margins a bi-rhombic flap was deemed most appropriate.  Using a sterile surgical marker, an appropriate rhombic flap was drawn incorporating the defect. The area thus outlined was incised deep to adipose tissue with a #15 scalpel blade.  The skin margins were undermined to an appropriate distance in all directions utilizing iris scissors.
Helical Rim Advancement Flap Text: The defect edges were debeveled with a #15 blade scalpel.  Given the location of the defect and the proximity to free margins (helical rim) a double helical rim advancement flap was deemed most appropriate.  Using a sterile surgical marker, the appropriate advancement flaps were drawn incorporating the defect and placing the expected incisions between the helical rim and antihelix where possible.  The area thus outlined was incised through and through with a #15 scalpel blade.  With a skin hook and iris scissors, the flaps were gently and sharply undermined and freed up.
Bilateral Helical Rim Advancement Flap Text: The defect edges were debeveled with a #15 blade scalpel.  Given the location of the defect and the proximity to free margins (helical rim) a bilateral helical rim advancement flap was deemed most appropriate.  Using a sterile surgical marker, the appropriate advancement flaps were drawn incorporating the defect and placing the expected incisions between the helical rim and antihelix where possible.  The area thus outlined was incised through and through with a #15 scalpel blade.  With a skin hook and iris scissors, the flaps were gently and sharply undermined and freed up.
Ear Star Wedge Flap Text: The defect edges were debeveled with a #15 blade scalpel.  Given the location of the defect and the proximity to free margins (helical rim) an ear star wedge flap was deemed most appropriate.  Using a sterile surgical marker, the appropriate flap was drawn incorporating the defect and placing the expected incisions between the helical rim and antihelix where possible.  The area thus outlined was incised through and through with a #15 scalpel blade.
Banner Transposition Flap Text: The defect edges were debeveled with a #15 scalpel blade.  Given the location of the defect and the proximity to free margins a Banner transposition flap was deemed most appropriate.  Using a sterile surgical marker, an appropriate flap drawn around the defect. The area thus outlined was incised deep to adipose tissue with a #15 scalpel blade.  The skin margins were undermined to an appropriate distance in all directions utilizing iris scissors.
Bilobed Flap Text: The defect edges were debeveled with a #15 scalpel blade.  Given the location of the defect and the proximity to free margins a bilobe flap was deemed most appropriate.  Using a sterile surgical marker, an appropriate bilobe flap drawn around the defect.    The area thus outlined was incised deep to adipose tissue with a #15 scalpel blade.  The skin margins were undermined to an appropriate distance in all directions utilizing iris scissors.
Bilobed Transposition Flap Text: The defect edges were debeveled with a #15 scalpel blade.  Given the location of the defect and the proximity to free margins a bilobed transposition flap was deemed most appropriate.  Using a sterile surgical marker, an appropriate bilobe flap drawn around the defect.    The area thus outlined was incised deep to adipose tissue with a #15 scalpel blade.  The skin margins were undermined to an appropriate distance in all directions utilizing iris scissors.
Trilobed Flap Text: The defect edges were debeveled with a #15 scalpel blade.  Given the location of the defect and the proximity to free margins a trilobed flap was deemed most appropriate.  Using a sterile surgical marker, an appropriate trilobed flap drawn around the defect.    The area thus outlined was incised deep to adipose tissue with a #15 scalpel blade.  The skin margins were undermined to an appropriate distance in all directions utilizing iris scissors.
Dorsal Nasal Flap Text: The defect edges were debeveled with a #15 scalpel blade.  Given the location of the defect and the proximity to free margins a dorsal nasal flap was deemed most appropriate.  Using a sterile surgical marker, an appropriate dorsal nasal flap was drawn around the defect.    The area thus outlined was incised deep to adipose tissue with a #15 scalpel blade.  The skin margins were undermined to an appropriate distance in all directions utilizing iris scissors.
Island Pedicle Flap Text: The defect edges were debeveled with a #15 scalpel blade.  Given the location of the defect, shape of the defect and the proximity to free margins an island pedicle advancement flap was deemed most appropriate.  Using a sterile surgical marker, an appropriate advancement flap was drawn incorporating the defect, outlining the appropriate donor tissue and placing the expected incisions within the relaxed skin tension lines where possible.    The area thus outlined was incised deep to adipose tissue with a #15 scalpel blade.  The skin margins were undermined to an appropriate distance in all directions around the primary defect and laterally outward around the island pedicle utilizing iris scissors.  There was minimal undermining beneath the pedicle flap.
Island Pedicle Flap With Canthal Suspension Text: The defect edges were debeveled with a #15 scalpel blade.  Given the location of the defect, shape of the defect and the proximity to free margins an island pedicle advancement flap was deemed most appropriate.  Using a sterile surgical marker, an appropriate advancement flap was drawn incorporating the defect, outlining the appropriate donor tissue and placing the expected incisions within the relaxed skin tension lines where possible. The area thus outlined was incised deep to adipose tissue with a #15 scalpel blade.  The skin margins were undermined to an appropriate distance in all directions around the primary defect and laterally outward around the island pedicle utilizing iris scissors.  There was minimal undermining beneath the pedicle flap. A suspension suture was placed in the canthal tendon to prevent tension and prevent ectropion.
Alar Island Pedicle Flap Text: The defect edges were debeveled with a #15 scalpel blade.  Given the location of the defect, shape of the defect and the proximity to the alar rim an island pedicle advancement flap was deemed most appropriate.  Using a sterile surgical marker, an appropriate advancement flap was drawn incorporating the defect, outlining the appropriate donor tissue and placing the expected incisions within the nasal ala running parallel to the alar rim. The area thus outlined was incised with a #15 scalpel blade.  The skin margins were undermined minimally to an appropriate distance in all directions around the primary defect and laterally outward around the island pedicle utilizing iris scissors.  There was minimal undermining beneath the pedicle flap.
Double Island Pedicle Flap Text: The defect edges were debeveled with a #15 scalpel blade.  Given the location of the defect, shape of the defect and the proximity to free margins a double island pedicle advancement flap was deemed most appropriate.  Using a sterile surgical marker, an appropriate advancement flap was drawn incorporating the defect, outlining the appropriate donor tissue and placing the expected incisions within the relaxed skin tension lines where possible.    The area thus outlined was incised deep to adipose tissue with a #15 scalpel blade.  The skin margins were undermined to an appropriate distance in all directions around the primary defect and laterally outward around the island pedicle utilizing iris scissors.  There was minimal undermining beneath the pedicle flap.
Island Pedicle Flap-Requiring Vessel Identification Text: The defect edges were debeveled with a #15 scalpel blade.  Given the location of the defect, shape of the defect and the proximity to free margins an island pedicle advancement flap was deemed most appropriate.  Using a sterile surgical marker, an appropriate advancement flap was drawn, based on the axial vessel mentioned above, incorporating the defect, outlining the appropriate donor tissue and placing the expected incisions within the relaxed skin tension lines where possible.    The area thus outlined was incised deep to adipose tissue with a #15 scalpel blade.  The skin margins were undermined to an appropriate distance in all directions around the primary defect and laterally outward around the island pedicle utilizing iris scissors.  There was minimal undermining beneath the pedicle flap.
Keystone Flap Text: The defect edges were debeveled with a #15 scalpel blade.  Given the location of the defect, shape of the defect a keystone flap was deemed most appropriate.  Using a sterile surgical marker, an appropriate keystone flap was drawn incorporating the defect, outlining the appropriate donor tissue and placing the expected incisions within the relaxed skin tension lines where possible. The area thus outlined was incised deep to adipose tissue with a #15 scalpel blade.  The skin margins were undermined to an appropriate distance in all directions around the primary defect and laterally outward around the flap utilizing iris scissors.
O-T Plasty Text: The defect edges were debeveled with a #15 scalpel blade.  Given the location of the defect, shape of the defect and the proximity to free margins an O-T plasty was deemed most appropriate.  Using a sterile surgical marker, an appropriate O-T plasty was drawn incorporating the defect and placing the expected incisions within the relaxed skin tension lines where possible.    The area thus outlined was incised deep to adipose tissue with a #15 scalpel blade.  The skin margins were undermined to an appropriate distance in all directions utilizing iris scissors.
O-Z Plasty Text: The defect edges were debeveled with a #15 scalpel blade.  Given the location of the defect, shape of the defect and the proximity to free margins an O-Z plasty (double transposition flap) was deemed most appropriate.  Using a sterile surgical marker, the appropriate transposition flaps were drawn incorporating the defect and placing the expected incisions within the relaxed skin tension lines where possible.    The area thus outlined was incised deep to adipose tissue with a #15 scalpel blade.  The skin margins were undermined to an appropriate distance in all directions utilizing iris scissors.  Hemostasis was achieved with electrocautery.  The flaps were then transposed into place, one clockwise and the other counterclockwise, and anchored with interrupted buried subcutaneous sutures.
Double O-Z Plasty Text: The defect edges were debeveled with a #15 scalpel blade.  Given the location of the defect, shape of the defect and the proximity to free margins a Double O-Z plasty (double transposition flap) was deemed most appropriate.  Using a sterile surgical marker, the appropriate transposition flaps were drawn incorporating the defect and placing the expected incisions within the relaxed skin tension lines where possible. The area thus outlined was incised deep to adipose tissue with a #15 scalpel blade.  The skin margins were undermined to an appropriate distance in all directions utilizing iris scissors.  Hemostasis was achieved with electrocautery.  The flaps were then transposed into place, one clockwise and the other counterclockwise, and anchored with interrupted buried subcutaneous sutures.
V-Y Plasty Text: The defect edges were debeveled with a #15 scalpel blade.  Given the location of the defect, shape of the defect and the proximity to free margins an V-Y advancement flap was deemed most appropriate.  Using a sterile surgical marker, an appropriate advancement flap was drawn incorporating the defect and placing the expected incisions within the relaxed skin tension lines where possible.    The area thus outlined was incised deep to adipose tissue with a #15 scalpel blade.  The skin margins were undermined to an appropriate distance in all directions utilizing iris scissors.
H Plasty Text: Given the location of the defect, shape of the defect and the proximity to free margins a H-plasty was deemed most appropriate for repair.  Using a sterile surgical marker, the appropriate advancement arms of the H-plasty were drawn incorporating the defect and placing the expected incisions within the relaxed skin tension lines where possible. The area thus outlined was incised deep to adipose tissue with a #15 scalpel blade. The skin margins were undermined to an appropriate distance in all directions utilizing iris scissors.  The opposing advancement arms were then advanced into place in opposite direction and anchored with interrupted buried subcutaneous sutures.
W Plasty Text: The lesion was extirpated to the level of the fat with a #15 scalpel blade.  Given the location of the defect, shape of the defect and the proximity to free margins a W-plasty was deemed most appropriate for repair.  Using a sterile surgical marker, the appropriate transposition arms of the W-plasty were drawn incorporating the defect and placing the expected incisions within the relaxed skin tension lines where possible.    The area thus outlined was incised deep to adipose tissue with a #15 scalpel blade.  The skin margins were undermined to an appropriate distance in all directions utilizing iris scissors.  The opposing transposition arms were then transposed into place in opposite direction and anchored with interrupted buried subcutaneous sutures.
Z Plasty Text: The lesion was extirpated to the level of the fat with a #15 scalpel blade.  Given the location of the defect, shape of the defect and the proximity to free margins a Z-plasty was deemed most appropriate for repair.  Using a sterile surgical marker, the appropriate transposition arms of the Z-plasty were drawn incorporating the defect and placing the expected incisions within the relaxed skin tension lines where possible.    The area thus outlined was incised deep to adipose tissue with a #15 scalpel blade.  The skin margins were undermined to an appropriate distance in all directions utilizing iris scissors.  The opposing transposition arms were then transposed into place in opposite direction and anchored with interrupted buried subcutaneous sutures.
Zygomaticofacial Flap Text: Given the location of the defect, shape of the defect and the proximity to free margins a zygomaticofacial flap was deemed most appropriate for repair.  Using a sterile surgical marker, the appropriate flap was drawn incorporating the defect and placing the expected incisions within the relaxed skin tension lines where possible. The area thus outlined was incised deep to adipose tissue with a #15 scalpel blade with preservation of a vascular pedicle.  The skin margins were undermined to an appropriate distance in all directions utilizing iris scissors.  The flap was then placed into the defect and anchored with interrupted buried subcutaneous sutures.
Cheek Interpolation Flap Text: A decision was made to reconstruct the defect utilizing an interpolation axial flap and a staged reconstruction.  A telfa template was made of the defect.  This telfa template was then used to outline the Cheek Interpolation flap.  The donor area for the pedicle flap was then injected with anesthesia.  The flap was excised through the skin and subcutaneous tissue down to the layer of the underlying musculature.  The interpolation flap was carefully excised within this deep plane to maintain its blood supply.  The edges of the donor site were undermined.   The donor site was closed in a primary fashion.  The pedicle was then rotated into position and sutured.  Once the tube was sutured into place, adequate blood supply was confirmed with blanching and refill.  The pedicle was then wrapped with xeroform gauze and dressed appropriately with a telfa and gauze bandage to ensure continued blood supply and protect the attached pedicle.
Cheek-To-Nose Interpolation Flap Text: A decision was made to reconstruct the defect utilizing an interpolation axial flap and a staged reconstruction.  A telfa template was made of the defect.  This telfa template was then used to outline the Cheek-To-Nose Interpolation flap.  The donor area for the pedicle flap was then injected with anesthesia.  The flap was excised through the skin and subcutaneous tissue down to the layer of the underlying musculature.  The interpolation flap was carefully excised within this deep plane to maintain its blood supply.  The edges of the donor site were undermined.   The donor site was closed in a primary fashion.  The pedicle was then rotated into position and sutured.  Once the tube was sutured into place, adequate blood supply was confirmed with blanching and refill.  The pedicle was then wrapped with xeroform gauze and dressed appropriately with a telfa and gauze bandage to ensure continued blood supply and protect the attached pedicle.
Interpolation Flap Text: A decision was made to reconstruct the defect utilizing an interpolation axial flap and a staged reconstruction.  A telfa template was made of the defect.  This telfa template was then used to outline the interpolation flap.  The donor area for the pedicle flap was then injected with anesthesia.  The flap was excised through the skin and subcutaneous tissue down to the layer of the underlying musculature.  The interpolation flap was carefully excised within this deep plane to maintain its blood supply.  The edges of the donor site were undermined.   The donor site was closed in a primary fashion.  The pedicle was then rotated into position and sutured.  Once the tube was sutured into place, adequate blood supply was confirmed with blanching and refill.  The pedicle was then wrapped with xeroform gauze and dressed appropriately with a telfa and gauze bandage to ensure continued blood supply and protect the attached pedicle.
Melolabial Interpolation Flap Text: A decision was made to reconstruct the defect utilizing an interpolation axial flap and a staged reconstruction.  A telfa template was made of the defect.  This telfa template was then used to outline the melolabial interpolation flap.  The donor area for the pedicle flap was then injected with anesthesia.  The flap was excised through the skin and subcutaneous tissue down to the layer of the underlying musculature.  The pedicle flap was carefully excised within this deep plane to maintain its blood supply.  The edges of the donor site were undermined.   The donor site was closed in a primary fashion.  The pedicle was then rotated into position and sutured.  Once the tube was sutured into place, adequate blood supply was confirmed with blanching and refill.  The pedicle was then wrapped with xeroform gauze and dressed appropriately with a telfa and gauze bandage to ensure continued blood supply and protect the attached pedicle.
Mastoid Interpolation Flap Text: A decision was made to reconstruct the defect utilizing an interpolation axial flap and a staged reconstruction.  A telfa template was made of the defect.  This telfa template was then used to outline the mastoid interpolation flap.  The donor area for the pedicle flap was then injected with anesthesia.  The flap was excised through the skin and subcutaneous tissue down to the layer of the underlying musculature.  The pedicle flap was carefully excised within this deep plane to maintain its blood supply.  The edges of the donor site were undermined.   The donor site was closed in a primary fashion.  The pedicle was then rotated into position and sutured.  Once the tube was sutured into place, adequate blood supply was confirmed with blanching and refill.  The pedicle was then wrapped with xeroform gauze and dressed appropriately with a telfa and gauze bandage to ensure continued blood supply and protect the attached pedicle.
Posterior Auricular Interpolation Flap Text: A decision was made to reconstruct the defect utilizing an interpolation axial flap and a staged reconstruction.  A telfa template was made of the defect.  This telfa template was then used to outline the posterior auricular interpolation flap.  The donor area for the pedicle flap was then injected with anesthesia.  The flap was excised through the skin and subcutaneous tissue down to the layer of the underlying musculature.  The pedicle flap was carefully excised within this deep plane to maintain its blood supply.  The edges of the donor site were undermined.   The donor site was closed in a primary fashion.  The pedicle was then rotated into position and sutured.  Once the tube was sutured into place, adequate blood supply was confirmed with blanching and refill.  The pedicle was then wrapped with xeroform gauze and dressed appropriately with a telfa and gauze bandage to ensure continued blood supply and protect the attached pedicle.
Paramedian Forehead Flap Text: A decision was made to reconstruct the defect utilizing an interpolation axial flap and a staged reconstruction.  A telfa template was made of the defect.  This telfa template was then used to outline the paramedian forehead pedicle flap.  The donor area for the pedicle flap was then injected with anesthesia.  The flap was excised through the skin and subcutaneous tissue down to the layer of the underlying musculature.  The pedicle flap was carefully excised within this deep plane to maintain its blood supply.  The edges of the donor site were undermined.   The donor site was closed in a primary fashion.  The pedicle was then rotated into position and sutured.  Once the tube was sutured into place, adequate blood supply was confirmed with blanching and refill.  The pedicle was then wrapped with xeroform gauze and dressed appropriately with a telfa and gauze bandage to ensure continued blood supply and protect the attached pedicle.
Cheiloplasty (Less Than 50%) Text: A decision was made to reconstruct the defect with a  cheiloplasty.  The defect was undermined extensively.  Additional obicularis oris muscle was excised with a 15 blade scalpel.  The defect was converted into a full thickness wedge, of less than 50% of the vertical height of the lip, to facilite a better cosmetic result.  Small vessels were then tied off with 5-0 monocyrl. The obicularis oris, superficial fascia, adipose and dermis were then reapproximated.  After the deeper layers were approximated the epidermis was reapproximated with particular care given to realign the vermilion border.
Cheiloplasty (Complex) Text: A decision was made to reconstruct the defect with a  cheiloplasty.  The defect was undermined extensively.  Additional obicularis oris muscle was excised with a 15 blade scalpel.  The defect was converted into a full thickness wedge to facilite a better cosmetic result.  Small vessels were then tied off with 5-0 monocyrl. The obicularis oris, superficial fascia, adipose and dermis were then reapproximated.  After the deeper layers were approximated the epidermis was reapproximated with particular care given to realign the vermilion border.
Ear Wedge Repair Text: A wedge excision was completed by carrying down an excision through the full thickness of the ear and cartilage with an inward facing Burow's triangle. The wound was then closed in a layered fashion.
Full Thickness Lip Wedge Repair (Flap) Text: Given the location of the defect and the proximity to free margins a full thickness wedge repair was deemed most appropriate.  Using a sterile surgical marker, the appropriate repair was drawn incorporating the defect and placing the expected incisions perpendicular to the vermilion border.  The vermilion border was also meticulously outlined to ensure appropriate reapproximation during the repair.  The area thus outlined was incised through and through with a #15 scalpel blade.  The muscularis and dermis were reaproximated with deep sutures following hemostasis. Care was taken to realign the vermilion border before proceeding with the superficial closure.  Once the vermilion was realigned the superfical and mucosal closure was finished.
Ftsg Text: The defect edges were debeveled with a #15 scalpel blade.  Given the location of the defect, shape of the defect and the proximity to free margins a full thickness skin graft was deemed most appropriate.  Using a sterile surgical marker, the primary defect shape was transferred to the donor site. The area thus outlined was incised deep to adipose tissue with a #15 scalpel blade.  The harvested graft was then trimmed of adipose tissue until only dermis and epidermis was left.  The skin margins of the secondary defect were undermined to an appropriate distance in all directions utilizing iris scissors.  The secondary defect was closed with interrupted buried subcutaneous sutures.  The skin edges were then re-apposed with running  sutures.  The skin graft was then placed in the primary defect and oriented appropriately.
Split-Thickness Skin Graft Text: The defect edges were debeveled with a #15 scalpel blade.  Given the location of the defect, shape of the defect and the proximity to free margins a split thickness skin graft was deemed most appropriate.  Using a sterile surgical marker, the primary defect shape was transferred to the donor site. The split thickness graft was then harvested.  The skin graft was then placed in the primary defect and oriented appropriately.
Burow's Graft Text: The defect edges were debeveled with a #15 scalpel blade.  Given the location of the defect, shape of the defect, the proximity to free margins and the presence of a standing cone deformity a Burow's skin graft was deemed most appropriate. The standing cone was removed and this tissue was then trimmed to the shape of the primary defect. The adipose tissue was also removed until only dermis and epidermis were left.  The skin margins of the secondary defect were undermined to an appropriate distance in all directions utilizing iris scissors.  The secondary defect was closed with interrupted buried subcutaneous sutures.  The skin edges were then re-apposed with running  sutures.  The skin graft was then placed in the primary defect and oriented appropriately.
Cartilage Graft Text: The defect edges were debeveled with a #15 scalpel blade.  Given the location of the defect, shape of the defect, the fact the defect involved a full thickness cartilage defect a cartilage graft was deemed most appropriate.  An appropriate donor site was identified, cleansed, and anesthetized. The cartilage graft was then harvested and transferred to the recipient site, oriented appropriately and then sutured into place.  The secondary defect was then repaired using a primary closure.
Composite Graft Text: The defect edges were debeveled with a #15 scalpel blade.  Given the location of the defect, shape of the defect, the proximity to free margins and the fact the defect was full thickness a composite graft was deemed most appropriate.  The defect was outline and then transferred to the donor site.  A full thickness graft was then excised from the donor site. The graft was then placed in the primary defect, oriented appropriately and then sutured into place.  The secondary defect was then repaired using a primary closure.
Epidermal Autograft Text: The defect edges were debeveled with a #15 scalpel blade.  Given the location of the defect, shape of the defect and the proximity to free margins an epidermal autograft was deemed most appropriate.  Using a sterile surgical marker, the primary defect shape was transferred to the donor site. The epidermal graft was then harvested.  The skin graft was then placed in the primary defect and oriented appropriately.
Dermal Autograft Text: The defect edges were debeveled with a #15 scalpel blade.  Given the location of the defect, shape of the defect and the proximity to free margins a dermal autograft was deemed most appropriate.  Using a sterile surgical marker, the primary defect shape was transferred to the donor site. The area thus outlined was incised deep to adipose tissue with a #15 scalpel blade.  The harvested graft was then trimmed of adipose and epidermal tissue until only dermis was left.  The skin graft was then placed in the primary defect and oriented appropriately.
Skin Substitute Text: The defect edges were debeveled with a #15 scalpel blade.  Given the location of the defect, shape of the defect and the proximity to free margins a skin substitute graft was deemed most appropriate.  The graft material was trimmed to fit the size of the defect. The graft was then placed in the primary defect and oriented appropriately.
Tissue Cultured Epidermal Autograft Text: The defect edges were debeveled with a #15 scalpel blade.  Given the location of the defect, shape of the defect and the proximity to free margins a tissue cultured epidermal autograft was deemed most appropriate.  The graft was then trimmed to fit the size of the defect.  The graft was then placed in the primary defect and oriented appropriately.
Xenograft Text: The defect edges were debeveled with a #15 scalpel blade.  Given the location of the defect, shape of the defect and the proximity to free margins a xenograft was deemed most appropriate.  The graft was then trimmed to fit the size of the defect.  The graft was then placed in the primary defect and oriented appropriately.
Purse String (Simple) Text: Given the location of the defect and the characteristics of the surrounding skin a purse string closure was deemed most appropriate.  Undermining was performed circumfirentially around the surgical defect.  A purse string suture was then placed and tightened.
Purse String (Intermediate) Text: Given the location of the defect and the characteristics of the surrounding skin a purse string intermediate closure was deemed most appropriate.  Undermining was performed circumfirentially around the surgical defect.  A purse string suture was then placed and tightened.
Partial Purse String (Simple) Text: Given the location of the defect and the characteristics of the surrounding skin a simple purse string closure was deemed most appropriate.  Undermining was performed circumfirentially around the surgical defect.  A purse string suture was then placed and tightened. Wound tension only allowed a partial closure of the circular defect.
Partial Purse String (Intermediate) Text: Given the location of the defect and the characteristics of the surrounding skin an intermediate purse string closure was deemed most appropriate.  Undermining was performed circumfirentially around the surgical defect.  A purse string suture was then placed and tightened. Wound tension only allowed a partial closure of the circular defect.
Localized Dermabrasion With Wire Brush Text: The patient was draped in routine manner.  Localized dermabrasion using 3 x 17 mm wire brush was performed in routine manner to papillary dermis. This spot dermabrasion is being performed to complete skin cancer reconstruction. It also will eliminate the other sun damaged precancerous cells that are known to be part of the regional effect of a lifetime's worth of sun exposure. This localized dermabrasion is therapeutic and should not be considered cosmetic in any regard.
Tarsorrhaphy Text: A tarsorrhaphy was performed using Frost sutures.
Complex Repair And Flap Additional Text (Will Appearing After The Standard Complex Repair Text): The complex repair was not sufficient to completely close the primary defect. The remaining additional defect was repaired with the flap mentioned below.
Complex Repair And Graft Additional Text (Will Appearing After The Standard Complex Repair Text): The complex repair was not sufficient to completely close the primary defect. The remaining additional defect was repaired with the graft mentioned below.
Manual Repair Warning Statement: We plan on removing the manually selected variable below in favor of our much easier automatic structured text blocks found in the previous tab. We decided to do this to help make the flow better and give you the full power of structured data. Manual selection is never going to be ideal in our platform and I would encourage you to avoid using manual selection from this point on, especially since I will be sunsetting this feature. It is important that you do one of two things with the customized text below. First, you can save all of the text in a word file so you can have it for future reference. Second, transfer the text to the appropriate area in the Library tab. Lastly, if there is a flap or graft type which we do not have you need to let us know right away so I can add it in before the variable is hidden. No need to panic, we plan to give you roughly 6 months to make the change.
Same Histology In Subsequent Stages Text: The pattern and morphology of the tumor is as described in the first stage.
No Residual Tumor Seen Histology Text: There were no malignant cells seen in the sections examined.
Inflammation Suggestive Of Cancer Camouflage Histology Text: There was a dense lymphocytic infiltrate which prevented adequate histologic evaluation of adjacent structures.
Bcc Histology Text: There were numerous aggregates of basaloid cells.
Bcc Infiltrative Histology Text: There were numerous aggregates of basaloid cells demonstrating an infiltrative pattern.
Mart-1 - Positive Histology Text: MART-1 staining demonstrates areas of higher density and clustering of melanocytes with Pagetoid spread upwards within the epidermis. The surgical margins are positive for tumor cells.
Mart-1 - Negative Histology Text: MART-1 staining demonstrates a normal density and pattern of melanocytes along the dermal-epidermal junction. The surgical margins are negative for tumor cells.
Information: Selecting Yes will display possible errors in your note based on the variables you have selected. This validation is only offered as a suggestion for you. PLEASE NOTE THAT THE VALIDATION TEXT WILL BE REMOVED WHEN YOU FINALIZE YOUR NOTE. IF YOU WANT TO FAX A PRELIMINARY NOTE YOU WILL NEED TO TOGGLE THIS TO 'NO' IF YOU DO NOT WANT IT IN YOUR FAXED NOTE.

## 2020-12-16 ENCOUNTER — APPOINTMENT (RX ONLY)
Dept: URBAN - METROPOLITAN AREA OTHER 8 | Facility: OTHER | Age: 85
Setting detail: DERMATOLOGY
End: 2020-12-16

## 2020-12-16 VITALS — TEMPERATURE: 97.9 F

## 2020-12-16 DIAGNOSIS — Z48.02 ENCOUNTER FOR REMOVAL OF SUTURES: ICD-10-CM

## 2020-12-16 PROCEDURE — ? ADDITIONAL NOTES

## 2020-12-16 PROCEDURE — 99024 POSTOP FOLLOW-UP VISIT: CPT

## 2020-12-16 PROCEDURE — ? SUTURE REMOVAL (GLOBAL PERIOD)

## 2020-12-16 ASSESSMENT — LOCATION SIMPLE DESCRIPTION DERM: LOCATION SIMPLE: LEFT PRETIBIAL REGION

## 2020-12-16 ASSESSMENT — PAIN INTENSITY VAS: HOW INTENSE IS YOUR PAIN 0 BEING NO PAIN, 10 BEING THE MOST SEVERE PAIN POSSIBLE?: NO PAIN

## 2020-12-16 ASSESSMENT — LOCATION DETAILED DESCRIPTION DERM: LOCATION DETAILED: LEFT DISTAL PRETIBIAL REGION

## 2020-12-16 ASSESSMENT — LOCATION ZONE DERM: LOCATION ZONE: LEG

## 2020-12-16 NOTE — PROCEDURE: ADDITIONAL NOTES
Additional Notes: wound care reviewed\\ncontinue cleansing wound daily + application of vaseline/telfa
Detail Level: Simple

## 2020-12-16 NOTE — PROCEDURE: SUTURE REMOVAL (GLOBAL PERIOD)
Detail Level: Detailed
Add 72204 Cpt? (Important Note: In 2017 The Use Of 72793 Is Being Tracked By Cms To Determine Future Global Period Reimbursement For Global Periods): yes

## 2021-01-20 ENCOUNTER — APPOINTMENT (RX ONLY)
Dept: URBAN - METROPOLITAN AREA OTHER 8 | Facility: OTHER | Age: 86
Setting detail: DERMATOLOGY
End: 2021-01-20

## 2021-01-20 DIAGNOSIS — Z48.817 ENCOUNTER FOR SURGICAL AFTERCARE FOLLOWING SURGERY ON THE SKIN AND SUBCUTANEOUS TISSUE: ICD-10-CM

## 2021-01-20 PROCEDURE — 99024 POSTOP FOLLOW-UP VISIT: CPT

## 2021-01-20 PROCEDURE — ? ADDITIONAL NOTES

## 2021-01-20 PROCEDURE — ? POST-OP WOUND CHECK

## 2021-01-20 ASSESSMENT — LOCATION SIMPLE DESCRIPTION DERM: LOCATION SIMPLE: LEFT PRETIBIAL REGION

## 2021-01-20 ASSESSMENT — LOCATION DETAILED DESCRIPTION DERM: LOCATION DETAILED: LEFT DISTAL PRETIBIAL REGION

## 2021-01-20 ASSESSMENT — LOCATION ZONE DERM: LOCATION ZONE: LEG

## 2021-01-20 NOTE — PROCEDURE: POST-OP WOUND CHECK
Detail Level: Detailed
Add 65251 Cpt? (Important Note: In 2017 The Use Of 13586 Is Being Tracked By Cms To Determine Future Global Period Reimbursement For Global Periods): yes
Wound Evaluated By: Dr. Herron

## 2021-01-20 NOTE — PROCEDURE: ADDITIONAL NOTES
Detail Level: Simple
Additional Notes: Advised pt to do warm compresses to help with scabbing. Pt will continue keeping the site clean and well moisturized with Vaseline.
Render Risk Assessment In Note?: no

## 2022-03-19 PROBLEM — I65.23 CAROTID ATHEROSCLEROSIS, BILATERAL: Status: ACTIVE | Noted: 2021-12-15

## 2022-03-19 PROBLEM — R06.09 DOE (DYSPNEA ON EXERTION): Status: ACTIVE | Noted: 2021-12-15

## 2022-03-19 PROBLEM — I35.0 AORTIC VALVE STENOSIS: Status: ACTIVE | Noted: 2021-12-15

## 2022-03-20 PROBLEM — I10 HYPERTENSION: Status: ACTIVE | Noted: 2021-12-15

## 2022-06-02 ENCOUNTER — APPOINTMENT (OUTPATIENT)
Dept: GENERAL RADIOLOGY | Age: 87
DRG: 200 | End: 2022-06-02
Payer: MEDICARE

## 2022-06-02 ENCOUNTER — APPOINTMENT (OUTPATIENT)
Dept: CT IMAGING | Age: 87
DRG: 200 | End: 2022-06-02
Payer: MEDICARE

## 2022-06-02 ENCOUNTER — HOSPITAL ENCOUNTER (EMERGENCY)
Age: 87
Discharge: ANOTHER HOSPITAL | DRG: 200 | End: 2022-06-03
Attending: EMERGENCY MEDICINE
Payer: MEDICARE

## 2022-06-02 DIAGNOSIS — D64.9 ANEMIA, UNSPECIFIED TYPE: ICD-10-CM

## 2022-06-02 DIAGNOSIS — S22.42XA CLOSED FRACTURE OF MULTIPLE RIBS OF LEFT SIDE, INITIAL ENCOUNTER: ICD-10-CM
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
## 2022-06-02 DIAGNOSIS — K92.2 GASTROINTESTINAL HEMORRHAGE, UNSPECIFIED GASTROINTESTINAL HEMORRHAGE TYPE: ICD-10-CM

## 2022-06-02 DIAGNOSIS — S27.0XXA TRAUMATIC PNEUMOTHORAX, INITIAL ENCOUNTER: ICD-10-CM

## 2022-06-02 LAB
ALBUMIN SERPL-MCNC: 3.9 G/DL (ref 3.2–4.6)
ALBUMIN/GLOB SERPL: 1.7 {RATIO}
ALP SERPL-CCNC: 63 U/L (ref 45–117)
ALT SERPL-CCNC: 14 U/L (ref 13–61)
ANION GAP SERPL CALC-SCNC: 18 MMOL/L (ref 7–16)
APPEARANCE UR: CLEAR
AST SERPL-CCNC: 21 U/L (ref 15–37)
BACTERIA URNS QL MICRO: ABNORMAL /HPF
BASOPHILS # BLD: 0 K/UL (ref 0–0.2)
BASOPHILS NFR BLD: 0 % (ref 0–2)
BILIRUB SERPL-MCNC: 0.4 MG/DL (ref 0.2–1.1)
BILIRUB UR QL: NEGATIVE
BUN SERPL-MCNC: 29 MG/DL (ref 7–18)
CALCIUM SERPL-MCNC: 9.1 MG/DL (ref 8.3–10.4)
CASTS URNS QL MICRO: 0 /LPF
CHLORIDE SERPL-SCNC: 95 MMOL/L (ref 98–107)
CK SERPL-CCNC: 124 U/L (ref 21–215)
CO2 SERPL-SCNC: 22 MMOL/L (ref 21–32)
COLOR UR: YELLOW
CREAT SERPL-MCNC: 1.02 MG/DL (ref 0.6–1)
CRYSTALS URNS QL MICRO: 0 /LPF
DIFFERENTIAL METHOD BLD: ABNORMAL
EOSINOPHIL # BLD: 0 K/UL (ref 0–0.8)
EOSINOPHIL NFR BLD: 0 % (ref 0.5–7.8)
EPI CELLS #/AREA URNS HPF: 0 /HPF
ERYTHROCYTE [DISTWIDTH] IN BLOOD BY AUTOMATED COUNT: 15.1 % (ref 11.9–14.6)
GLOBULIN SER CALC-MCNC: 2.3 G/DL (ref 2.3–3.5)
GLUCOSE SERPL-MCNC: 389 MG/DL (ref 65–100)
GLUCOSE UR STRIP.AUTO-MCNC: >1000 MG/DL
HCT VFR BLD AUTO: 22.9 % (ref 35.8–46.3)
HGB BLD-MCNC: 7 G/DL (ref 11.7–15.4)
HGB UR QL STRIP: ABNORMAL
IMM GRANULOCYTES # BLD AUTO: 0.1 K/UL (ref 0–0.5)
IMM GRANULOCYTES NFR BLD AUTO: 0 % (ref 0–5)
INR PPP: 1.2
KETONES UR QL STRIP.AUTO: ABNORMAL MG/DL
LEUKOCYTE ESTERASE UR QL STRIP.AUTO: ABNORMAL
LYMPHOCYTES # BLD: 1.1 K/UL (ref 0.5–4.6)
LYMPHOCYTES NFR BLD: 7 % (ref 13–44)
MCH RBC QN AUTO: 25.1 PG (ref 26.1–32.9)
MCHC RBC AUTO-ENTMCNC: 30.6 G/DL (ref 31.4–35)
MCV RBC AUTO: 82.1 FL (ref 79.6–97.8)
MONOCYTES # BLD: 0.8 K/UL (ref 0.1–1.3)
MONOCYTES NFR BLD: 5 % (ref 4–12)
MUCOUS THREADS URNS QL MICRO: 0 /LPF
NEUTS SEG # BLD: 13.9 K/UL (ref 1.7–8.2)
NEUTS SEG NFR BLD: 87 % (ref 43–78)
NITRITE UR QL STRIP.AUTO: NEGATIVE
NRBC # BLD: 0 K/UL (ref 0–0.2)
OTHER OBSERVATIONS: ABNORMAL
PH UR STRIP: 6.5 [PH] (ref 5–9)
PLATELET # BLD AUTO: 319 K/UL (ref 150–450)
PMV BLD AUTO: 9.9 FL (ref 9.4–12.3)
POTASSIUM SERPL-SCNC: 4.2 MMOL/L (ref 3.5–5.1)
PROT SERPL-MCNC: 6.2 G/DL (ref 6.4–8.2)
PROT UR STRIP-MCNC: NEGATIVE MG/DL
PROTHROMBIN TIME: 15.9 SEC (ref 12.6–14.5)
RBC # BLD AUTO: 2.79 M/UL (ref 4.05–5.2)
RBC #/AREA URNS HPF: ABNORMAL /HPF
SODIUM SERPL-SCNC: 135 MMOL/L (ref 136–145)
SP GR UR REFRACTOMETRY: 1.02 (ref 1–1.02)
UROBILINOGEN UR QL STRIP.AUTO: 0.2 EU/DL (ref 0.2–1)
WBC # BLD AUTO: 15.9 K/UL (ref 4.3–11.1)
WBC URNS QL MICRO: ABNORMAL /HPF

## 2022-06-02 PROCEDURE — 99285 EMERGENCY DEPT VISIT HI MDM: CPT

## 2022-06-02 PROCEDURE — 85025 COMPLETE CBC W/AUTO DIFF WBC: CPT

## 2022-06-02 PROCEDURE — 6360000002 HC RX W HCPCS: Performed by: EMERGENCY MEDICINE

## 2022-06-02 PROCEDURE — 96376 TX/PRO/DX INJ SAME DRUG ADON: CPT

## 2022-06-02 PROCEDURE — 73502 X-RAY EXAM HIP UNI 2-3 VIEWS: CPT

## 2022-06-02 PROCEDURE — 71101 X-RAY EXAM UNILAT RIBS/CHEST: CPT

## 2022-06-02 PROCEDURE — 81015 MICROSCOPIC EXAM OF URINE: CPT

## 2022-06-02 PROCEDURE — 72100 X-RAY EXAM L-S SPINE 2/3 VWS: CPT

## 2022-06-02 PROCEDURE — 80053 COMPREHEN METABOLIC PANEL: CPT

## 2022-06-02 PROCEDURE — 2580000003 HC RX 258: Performed by: EMERGENCY MEDICINE

## 2022-06-02 PROCEDURE — 73501 X-RAY EXAM HIP UNI 1 VIEW: CPT

## 2022-06-02 PROCEDURE — 96375 TX/PRO/DX INJ NEW DRUG ADDON: CPT

## 2022-06-02 PROCEDURE — 70450 CT HEAD/BRAIN W/O DYE: CPT

## 2022-06-02 PROCEDURE — 85610 PROTHROMBIN TIME: CPT

## 2022-06-02 PROCEDURE — 72125 CT NECK SPINE W/O DYE: CPT

## 2022-06-02 PROCEDURE — 82550 ASSAY OF CK (CPK): CPT

## 2022-06-02 PROCEDURE — 96374 THER/PROPH/DIAG INJ IV PUSH: CPT

## 2022-06-02 PROCEDURE — 81003 URINALYSIS AUTO W/O SCOPE: CPT

## 2022-06-02 RX ORDER — MORPHINE SULFATE 2 MG/ML
2 INJECTION, SOLUTION INTRAMUSCULAR; INTRAVENOUS
Status: COMPLETED | OUTPATIENT
Start: 2022-06-02 | End: 2022-06-02

## 2022-06-02 RX ORDER — SODIUM CHLORIDE 9 MG/ML
INJECTION, SOLUTION INTRAVENOUS CONTINUOUS
Status: DISCONTINUED | OUTPATIENT
Start: 2022-06-02 | End: 2022-06-03 | Stop reason: HOSPADM

## 2022-06-02 RX ORDER — MORPHINE SULFATE 4 MG/ML
4 INJECTION INTRAVENOUS
Status: COMPLETED | OUTPATIENT
Start: 2022-06-02 | End: 2022-06-02

## 2022-06-02 RX ORDER — ONDANSETRON 2 MG/ML
4 INJECTION INTRAMUSCULAR; INTRAVENOUS
Status: COMPLETED | OUTPATIENT
Start: 2022-06-02 | End: 2022-06-02

## 2022-06-02 RX ADMIN — SODIUM CHLORIDE: 9 INJECTION, SOLUTION INTRAVENOUS at 22:06

## 2022-06-02 RX ADMIN — ONDANSETRON 4 MG: 2 INJECTION INTRAMUSCULAR; INTRAVENOUS at 22:07

## 2022-06-02 RX ADMIN — MORPHINE SULFATE 4 MG: 4 INJECTION INTRAVENOUS at 22:07

## 2022-06-02 RX ADMIN — MORPHINE SULFATE 2 MG: 2 INJECTION, SOLUTION INTRAMUSCULAR; INTRAVENOUS at 23:29

## 2022-06-02 ASSESSMENT — PAIN SCALES - GENERAL
PAINLEVEL_OUTOF10: 10
PAINLEVEL_OUTOF10: 10
PAINLEVEL_OUTOF10: 5
PAINLEVEL_OUTOF10: 10

## 2022-06-02 ASSESSMENT — PAIN DESCRIPTION - ORIENTATION: ORIENTATION: LEFT

## 2022-06-02 ASSESSMENT — ENCOUNTER SYMPTOMS: BACK PAIN: 1

## 2022-06-02 ASSESSMENT — PAIN DESCRIPTION - LOCATION: LOCATION: RIB CAGE;BACK

## 2022-06-02 ASSESSMENT — PAIN DESCRIPTION - DESCRIPTORS: DESCRIPTORS: ACHING

## 2022-06-02 ASSESSMENT — PAIN - FUNCTIONAL ASSESSMENT: PAIN_FUNCTIONAL_ASSESSMENT: 0-10

## 2022-06-03 ENCOUNTER — APPOINTMENT (OUTPATIENT)
Dept: GENERAL RADIOLOGY | Age: 87
DRG: 200 | End: 2022-06-03
Attending: FAMILY MEDICINE
Payer: MEDICARE

## 2022-06-03 ENCOUNTER — HOSPITAL ENCOUNTER (INPATIENT)
Age: 87
LOS: 10 days | Discharge: NURSING, REHAB OR LONG-TERM CARE | DRG: 200 | End: 2022-06-13
Attending: FAMILY MEDICINE
Payer: MEDICARE

## 2022-06-03 ENCOUNTER — HOSPITAL ENCOUNTER (INPATIENT)
Age: 87
LOS: 1 days | Discharge: ANOTHER HOSPITAL | DRG: 200 | End: 2022-06-03
Attending: FAMILY MEDICINE
Payer: MEDICARE

## 2022-06-03 VITALS
TEMPERATURE: 99.1 F | HEART RATE: 102 BPM | BODY MASS INDEX: 17.75 KG/M2 | RESPIRATION RATE: 18 BRPM | SYSTOLIC BLOOD PRESSURE: 146 MMHG | DIASTOLIC BLOOD PRESSURE: 56 MMHG | OXYGEN SATURATION: 100 % | HEIGHT: 64 IN | WEIGHT: 104 LBS

## 2022-06-03 VITALS
WEIGHT: 105 LBS | TEMPERATURE: 98.2 F | RESPIRATION RATE: 25 BRPM | HEIGHT: 64 IN | HEART RATE: 115 BPM | BODY MASS INDEX: 17.93 KG/M2 | DIASTOLIC BLOOD PRESSURE: 67 MMHG | OXYGEN SATURATION: 96 % | SYSTOLIC BLOOD PRESSURE: 174 MMHG

## 2022-06-03 DIAGNOSIS — D62 ACUTE BLOOD LOSS ANEMIA (ABLA): ICD-10-CM

## 2022-06-03 DIAGNOSIS — E87.1 HYPONATREMIA: Primary | ICD-10-CM

## 2022-06-03 PROBLEM — J93.9 PNEUMOTHORAX: Status: ACTIVE | Noted: 2022-06-03

## 2022-06-03 PROBLEM — I65.23 CAROTID ATHEROSCLEROSIS, BILATERAL: Chronic | Status: ACTIVE | Noted: 2021-12-15

## 2022-06-03 PROBLEM — I35.0 AORTIC VALVE STENOSIS: Chronic | Status: ACTIVE | Noted: 2021-12-15

## 2022-06-03 PROBLEM — S27.0XXA TRAUMATIC PNEUMOTHORAX: Status: ACTIVE | Noted: 2022-06-03

## 2022-06-03 PROBLEM — K92.2 GI BLEED: Status: ACTIVE | Noted: 2022-06-03

## 2022-06-03 PROBLEM — I10 HYPERTENSION: Chronic | Status: ACTIVE | Noted: 2021-12-15

## 2022-06-03 PROBLEM — I10 HYPERTENSION: Status: ACTIVE | Noted: 2021-12-15

## 2022-06-03 PROBLEM — I35.0 AORTIC VALVE STENOSIS: Status: ACTIVE | Noted: 2021-12-15

## 2022-06-03 PROBLEM — S27.0XXA PNEUMOTHORAX, TRAUMATIC: Status: ACTIVE | Noted: 2022-06-03

## 2022-06-03 LAB
ABO + RH BLD: NORMAL
ANION GAP SERPL CALC-SCNC: 11 MMOL/L (ref 7–16)
BLOOD GROUP ANTIBODIES SERPL: NORMAL
BUN SERPL-MCNC: 24 MG/DL (ref 8–23)
CALCIUM SERPL-MCNC: 8.5 MG/DL (ref 8.3–10.4)
CHLORIDE SERPL-SCNC: 99 MMOL/L (ref 98–107)
CO2 SERPL-SCNC: 25 MMOL/L (ref 21–32)
CREAT SERPL-MCNC: 1.14 MG/DL (ref 0.6–1)
GLUCOSE BLD STRIP.AUTO-MCNC: 275 MG/DL (ref 65–100)
GLUCOSE BLD STRIP.AUTO-MCNC: 305 MG/DL (ref 65–100)
GLUCOSE SERPL-MCNC: 273 MG/DL (ref 65–100)
HGB BLD-MCNC: 6.4 G/DL (ref 11.7–15.4)
HGB BLD-MCNC: 7 G/DL (ref 11.7–15.4)
HGB BLD-MCNC: 7 G/DL (ref 11.7–15.4)
POTASSIUM SERPL-SCNC: 3.9 MMOL/L (ref 3.5–5.1)
SERVICE CMNT-IMP: ABNORMAL
SERVICE CMNT-IMP: ABNORMAL
SODIUM SERPL-SCNC: 135 MMOL/L (ref 136–145)
SPECIMEN EXP DATE BLD: NORMAL

## 2022-06-03 PROCEDURE — 6360000002 HC RX W HCPCS: Performed by: FAMILY MEDICINE

## 2022-06-03 PROCEDURE — C9113 INJ PANTOPRAZOLE SODIUM, VIA: HCPCS | Performed by: INTERNAL MEDICINE

## 2022-06-03 PROCEDURE — 94760 N-INVAS EAR/PLS OXIMETRY 1: CPT

## 2022-06-03 PROCEDURE — 36415 COLL VENOUS BLD VENIPUNCTURE: CPT

## 2022-06-03 PROCEDURE — 86923 COMPATIBILITY TEST ELECTRIC: CPT

## 2022-06-03 PROCEDURE — 85018 HEMOGLOBIN: CPT

## 2022-06-03 PROCEDURE — 80048 BASIC METABOLIC PNL TOTAL CA: CPT

## 2022-06-03 PROCEDURE — 82962 GLUCOSE BLOOD TEST: CPT

## 2022-06-03 PROCEDURE — 1100000003 HC PRIVATE W/ TELEMETRY

## 2022-06-03 PROCEDURE — 2700000000 HC OXYGEN THERAPY PER DAY

## 2022-06-03 PROCEDURE — 6360000002 HC RX W HCPCS: Performed by: INTERNAL MEDICINE

## 2022-06-03 PROCEDURE — 86901 BLOOD TYPING SEROLOGIC RH(D): CPT

## 2022-06-03 PROCEDURE — 71046 X-RAY EXAM CHEST 2 VIEWS: CPT

## 2022-06-03 PROCEDURE — 6370000000 HC RX 637 (ALT 250 FOR IP): Performed by: FAMILY MEDICINE

## 2022-06-03 PROCEDURE — 30233N1 TRANSFUSION OF NONAUTOLOGOUS RED BLOOD CELLS INTO PERIPHERAL VEIN, PERCUTANEOUS APPROACH: ICD-10-PCS | Performed by: INTERNAL MEDICINE

## 2022-06-03 PROCEDURE — A4216 STERILE WATER/SALINE, 10 ML: HCPCS | Performed by: INTERNAL MEDICINE

## 2022-06-03 PROCEDURE — 2580000003 HC RX 258: Performed by: FAMILY MEDICINE

## 2022-06-03 PROCEDURE — 1100000000 HC RM PRIVATE

## 2022-06-03 PROCEDURE — C9113 INJ PANTOPRAZOLE SODIUM, VIA: HCPCS | Performed by: FAMILY MEDICINE

## 2022-06-03 PROCEDURE — 2580000003 HC RX 258: Performed by: INTERNAL MEDICINE

## 2022-06-03 RX ORDER — ACETAMINOPHEN 325 MG/1
650 TABLET ORAL EVERY 6 HOURS PRN
Status: DISCONTINUED | OUTPATIENT
Start: 2022-06-03 | End: 2022-06-03 | Stop reason: HOSPADM

## 2022-06-03 RX ORDER — MORPHINE SULFATE 2 MG/ML
4 INJECTION, SOLUTION INTRAMUSCULAR; INTRAVENOUS EVERY 4 HOURS PRN
Status: DISCONTINUED | OUTPATIENT
Start: 2022-06-03 | End: 2022-06-05

## 2022-06-03 RX ORDER — AMLODIPINE BESYLATE 5 MG/1
5 TABLET ORAL DAILY
Status: CANCELLED | OUTPATIENT
Start: 2022-06-04

## 2022-06-03 RX ORDER — MORPHINE SULFATE 2 MG/ML
4 INJECTION, SOLUTION INTRAMUSCULAR; INTRAVENOUS EVERY 4 HOURS PRN
Status: DISCONTINUED | OUTPATIENT
Start: 2022-06-03 | End: 2022-06-03 | Stop reason: HOSPADM

## 2022-06-03 RX ORDER — SODIUM CHLORIDE 9 MG/ML
INJECTION, SOLUTION INTRAVENOUS PRN
Status: DISCONTINUED | OUTPATIENT
Start: 2022-06-03 | End: 2022-06-13 | Stop reason: HOSPADM

## 2022-06-03 RX ORDER — OXYCODONE HYDROCHLORIDE 5 MG/1
10 TABLET ORAL EVERY 4 HOURS PRN
Status: CANCELLED | OUTPATIENT
Start: 2022-06-03

## 2022-06-03 RX ORDER — DEXTROSE MONOHYDRATE 50 MG/ML
100 INJECTION, SOLUTION INTRAVENOUS PRN
Status: DISCONTINUED | OUTPATIENT
Start: 2022-06-03 | End: 2022-06-13 | Stop reason: HOSPADM

## 2022-06-03 RX ORDER — VITAMIN B COMPLEX
5000 TABLET ORAL DAILY
Status: CANCELLED | OUTPATIENT
Start: 2022-06-04

## 2022-06-03 RX ORDER — ONDANSETRON 4 MG/1
4 TABLET, ORALLY DISINTEGRATING ORAL EVERY 8 HOURS PRN
Status: CANCELLED | OUTPATIENT
Start: 2022-06-03

## 2022-06-03 RX ORDER — GLUCAGON 1 MG/ML
1 KIT INJECTION PRN
Status: DISCONTINUED | OUTPATIENT
Start: 2022-06-03 | End: 2022-06-13 | Stop reason: HOSPADM

## 2022-06-03 RX ORDER — ACETAMINOPHEN 650 MG/1
650 SUPPOSITORY RECTAL EVERY 6 HOURS PRN
Status: CANCELLED | OUTPATIENT
Start: 2022-06-03

## 2022-06-03 RX ORDER — ALBUTEROL SULFATE 90 UG/1
2 AEROSOL, METERED RESPIRATORY (INHALATION) EVERY 4 HOURS PRN
Status: DISCONTINUED | OUTPATIENT
Start: 2022-06-03 | End: 2022-06-13 | Stop reason: HOSPADM

## 2022-06-03 RX ORDER — ACETAMINOPHEN 650 MG/1
650 SUPPOSITORY RECTAL EVERY 6 HOURS PRN
Status: DISCONTINUED | OUTPATIENT
Start: 2022-06-03 | End: 2022-06-03 | Stop reason: HOSPADM

## 2022-06-03 RX ORDER — SODIUM CHLORIDE 9 MG/ML
INJECTION, SOLUTION INTRAVENOUS PRN
Status: DISCONTINUED | OUTPATIENT
Start: 2022-06-03 | End: 2022-06-04

## 2022-06-03 RX ORDER — SODIUM CHLORIDE 9 MG/ML
INJECTION, SOLUTION INTRAVENOUS CONTINUOUS
Status: CANCELLED | OUTPATIENT
Start: 2022-06-03

## 2022-06-03 RX ORDER — AMLODIPINE BESYLATE 5 MG/1
5 TABLET ORAL DAILY
Status: DISCONTINUED | OUTPATIENT
Start: 2022-06-04 | End: 2022-06-06

## 2022-06-03 RX ORDER — POLYETHYLENE GLYCOL 3350 17 G/17G
17 POWDER, FOR SOLUTION ORAL DAILY PRN
Status: CANCELLED | OUTPATIENT
Start: 2022-06-03

## 2022-06-03 RX ORDER — ONDANSETRON 4 MG/1
4 TABLET, ORALLY DISINTEGRATING ORAL EVERY 8 HOURS PRN
Status: DISCONTINUED | OUTPATIENT
Start: 2022-06-03 | End: 2022-06-13 | Stop reason: HOSPADM

## 2022-06-03 RX ORDER — HYDROCODONE BITARTRATE AND ACETAMINOPHEN 5; 325 MG/1; MG/1
1 TABLET ORAL EVERY 6 HOURS PRN
Status: DISCONTINUED | OUTPATIENT
Start: 2022-06-03 | End: 2022-06-03

## 2022-06-03 RX ORDER — SODIUM CHLORIDE 0.9 % (FLUSH) 0.9 %
5-40 SYRINGE (ML) INJECTION EVERY 12 HOURS SCHEDULED
Status: CANCELLED | OUTPATIENT
Start: 2022-06-03

## 2022-06-03 RX ORDER — ONDANSETRON 2 MG/ML
4 INJECTION INTRAMUSCULAR; INTRAVENOUS EVERY 6 HOURS PRN
Status: DISCONTINUED | OUTPATIENT
Start: 2022-06-03 | End: 2022-06-03 | Stop reason: HOSPADM

## 2022-06-03 RX ORDER — SODIUM CHLORIDE 9 MG/ML
INJECTION, SOLUTION INTRAVENOUS PRN
Status: CANCELLED | OUTPATIENT
Start: 2022-06-03

## 2022-06-03 RX ORDER — ONDANSETRON 2 MG/ML
4 INJECTION INTRAMUSCULAR; INTRAVENOUS EVERY 6 HOURS PRN
Status: CANCELLED | OUTPATIENT
Start: 2022-06-03

## 2022-06-03 RX ORDER — AMLODIPINE BESYLATE 5 MG/1
5 TABLET ORAL DAILY
Status: DISCONTINUED | OUTPATIENT
Start: 2022-06-03 | End: 2022-06-03 | Stop reason: HOSPADM

## 2022-06-03 RX ORDER — OXYCODONE HYDROCHLORIDE 5 MG/1
10 TABLET ORAL EVERY 4 HOURS PRN
Status: DISCONTINUED | OUTPATIENT
Start: 2022-06-03 | End: 2022-06-05

## 2022-06-03 RX ORDER — ONDANSETRON 2 MG/ML
4 INJECTION INTRAMUSCULAR; INTRAVENOUS EVERY 6 HOURS PRN
Status: DISCONTINUED | OUTPATIENT
Start: 2022-06-03 | End: 2022-06-13 | Stop reason: HOSPADM

## 2022-06-03 RX ORDER — HYDROCODONE BITARTRATE AND ACETAMINOPHEN 5; 325 MG/1; MG/1
1 TABLET ORAL EVERY 6 HOURS PRN
Status: CANCELLED | OUTPATIENT
Start: 2022-06-03

## 2022-06-03 RX ORDER — ONDANSETRON 4 MG/1
4 TABLET, ORALLY DISINTEGRATING ORAL EVERY 8 HOURS PRN
Status: DISCONTINUED | OUTPATIENT
Start: 2022-06-03 | End: 2022-06-03 | Stop reason: HOSPADM

## 2022-06-03 RX ORDER — ACETAMINOPHEN 325 MG/1
650 TABLET ORAL EVERY 6 HOURS PRN
Status: CANCELLED | OUTPATIENT
Start: 2022-06-03

## 2022-06-03 RX ORDER — POLYETHYLENE GLYCOL 3350 17 G/17G
17 POWDER, FOR SOLUTION ORAL DAILY PRN
Status: DISCONTINUED | OUTPATIENT
Start: 2022-06-03 | End: 2022-06-13 | Stop reason: HOSPADM

## 2022-06-03 RX ORDER — SODIUM CHLORIDE 9 MG/ML
INJECTION, SOLUTION INTRAVENOUS CONTINUOUS
Status: DISCONTINUED | OUTPATIENT
Start: 2022-06-03 | End: 2022-06-03 | Stop reason: HOSPADM

## 2022-06-03 RX ORDER — SODIUM CHLORIDE 9 MG/ML
INJECTION, SOLUTION INTRAVENOUS CONTINUOUS
Status: DISCONTINUED | OUTPATIENT
Start: 2022-06-03 | End: 2022-06-04

## 2022-06-03 RX ORDER — ACETAMINOPHEN 650 MG/1
650 SUPPOSITORY RECTAL EVERY 6 HOURS PRN
Status: DISCONTINUED | OUTPATIENT
Start: 2022-06-03 | End: 2022-06-13 | Stop reason: HOSPADM

## 2022-06-03 RX ORDER — INSULIN LISPRO 100 [IU]/ML
0-8 INJECTION, SOLUTION INTRAVENOUS; SUBCUTANEOUS
Status: DISCONTINUED | OUTPATIENT
Start: 2022-06-04 | End: 2022-06-09

## 2022-06-03 RX ORDER — ACETAMINOPHEN 325 MG/1
650 TABLET ORAL EVERY 6 HOURS PRN
Status: DISCONTINUED | OUTPATIENT
Start: 2022-06-03 | End: 2022-06-13 | Stop reason: HOSPADM

## 2022-06-03 RX ORDER — MORPHINE SULFATE 2 MG/ML
4 INJECTION, SOLUTION INTRAMUSCULAR; INTRAVENOUS EVERY 4 HOURS PRN
Status: CANCELLED | OUTPATIENT
Start: 2022-06-03

## 2022-06-03 RX ORDER — VITAMIN B COMPLEX
5000 TABLET ORAL DAILY
Status: DISCONTINUED | OUTPATIENT
Start: 2022-06-04 | End: 2022-06-13

## 2022-06-03 RX ORDER — MORPHINE SULFATE 2 MG/ML
1 INJECTION, SOLUTION INTRAMUSCULAR; INTRAVENOUS EVERY 4 HOURS PRN
Status: DISCONTINUED | OUTPATIENT
Start: 2022-06-03 | End: 2022-06-03

## 2022-06-03 RX ORDER — ALBUTEROL SULFATE 90 UG/1
2 AEROSOL, METERED RESPIRATORY (INHALATION) EVERY 4 HOURS PRN
Status: CANCELLED | OUTPATIENT
Start: 2022-06-03

## 2022-06-03 RX ORDER — VITAMIN B COMPLEX
5000 TABLET ORAL DAILY
Status: DISCONTINUED | OUTPATIENT
Start: 2022-06-03 | End: 2022-06-03 | Stop reason: HOSPADM

## 2022-06-03 RX ORDER — ALBUTEROL SULFATE 90 UG/1
2 AEROSOL, METERED RESPIRATORY (INHALATION) EVERY 4 HOURS PRN
Status: DISCONTINUED | OUTPATIENT
Start: 2022-06-03 | End: 2022-06-03 | Stop reason: HOSPADM

## 2022-06-03 RX ORDER — SODIUM CHLORIDE 9 MG/ML
INJECTION, SOLUTION INTRAVENOUS PRN
Status: DISCONTINUED | OUTPATIENT
Start: 2022-06-03 | End: 2022-06-03 | Stop reason: HOSPADM

## 2022-06-03 RX ORDER — INSULIN LISPRO 100 [IU]/ML
0-4 INJECTION, SOLUTION INTRAVENOUS; SUBCUTANEOUS NIGHTLY
Status: DISCONTINUED | OUTPATIENT
Start: 2022-06-03 | End: 2022-06-09

## 2022-06-03 RX ORDER — OXYCODONE HYDROCHLORIDE 5 MG/1
10 TABLET ORAL EVERY 4 HOURS PRN
Status: DISCONTINUED | OUTPATIENT
Start: 2022-06-03 | End: 2022-06-03 | Stop reason: HOSPADM

## 2022-06-03 RX ORDER — POLYETHYLENE GLYCOL 3350 17 G/17G
17 POWDER, FOR SOLUTION ORAL DAILY PRN
Status: DISCONTINUED | OUTPATIENT
Start: 2022-06-03 | End: 2022-06-03 | Stop reason: HOSPADM

## 2022-06-03 RX ORDER — SODIUM CHLORIDE 0.9 % (FLUSH) 0.9 %
5-40 SYRINGE (ML) INJECTION PRN
Status: CANCELLED | OUTPATIENT
Start: 2022-06-03

## 2022-06-03 RX ADMIN — SODIUM CHLORIDE 40 MG: 9 INJECTION, SOLUTION INTRAMUSCULAR; INTRAVENOUS; SUBCUTANEOUS at 20:32

## 2022-06-03 RX ADMIN — ONDANSETRON 4 MG: 2 INJECTION INTRAMUSCULAR; INTRAVENOUS at 20:31

## 2022-06-03 RX ADMIN — VITAMIN D, TAB 1000IU (100/BT) 5000 UNITS: 25 TAB at 08:44

## 2022-06-03 RX ADMIN — SODIUM CHLORIDE: 9 INJECTION, SOLUTION INTRAVENOUS at 18:07

## 2022-06-03 RX ADMIN — MORPHINE SULFATE 4 MG: 2 INJECTION, SOLUTION INTRAMUSCULAR; INTRAVENOUS at 20:31

## 2022-06-03 RX ADMIN — AMLODIPINE BESYLATE 5 MG: 5 TABLET ORAL at 08:44

## 2022-06-03 RX ADMIN — HYDROCODONE BITARTRATE AND ACETAMINOPHEN 1 TABLET: 5; 325 TABLET ORAL at 03:38

## 2022-06-03 RX ADMIN — MORPHINE SULFATE 1 MG: 2 INJECTION, SOLUTION INTRAMUSCULAR; INTRAVENOUS at 11:33

## 2022-06-03 RX ADMIN — MORPHINE SULFATE 4 MG: 2 INJECTION, SOLUTION INTRAMUSCULAR; INTRAVENOUS at 16:02

## 2022-06-03 RX ADMIN — SODIUM CHLORIDE: 9 INJECTION, SOLUTION INTRAVENOUS at 01:02

## 2022-06-03 RX ADMIN — MORPHINE SULFATE 1 MG: 2 INJECTION, SOLUTION INTRAMUSCULAR; INTRAVENOUS at 06:31

## 2022-06-03 RX ADMIN — ONDANSETRON 4 MG: 2 INJECTION INTRAMUSCULAR; INTRAVENOUS at 05:08

## 2022-06-03 RX ADMIN — SODIUM CHLORIDE, PRESERVATIVE FREE 40 MG: 5 INJECTION INTRAVENOUS at 08:46

## 2022-06-03 RX ADMIN — ONDANSETRON 4 MG: 4 TABLET, ORALLY DISINTEGRATING ORAL at 11:29

## 2022-06-03 ASSESSMENT — PAIN SCALES - GENERAL
PAINLEVEL_OUTOF10: 9
PAINLEVEL_OUTOF10: 7
PAINLEVEL_OUTOF10: 10
PAINLEVEL_OUTOF10: 8
PAINLEVEL_OUTOF10: 7
PAINLEVEL_OUTOF10: 5

## 2022-06-03 ASSESSMENT — PAIN DESCRIPTION - FREQUENCY: FREQUENCY: INTERMITTENT

## 2022-06-03 ASSESSMENT — PAIN DESCRIPTION - LOCATION
LOCATION: BACK;GENERALIZED
LOCATION: RIB CAGE
LOCATION: CHEST
LOCATION: RIB CAGE

## 2022-06-03 ASSESSMENT — PAIN DESCRIPTION - ORIENTATION
ORIENTATION: LEFT;RIGHT;POSTERIOR
ORIENTATION: LEFT

## 2022-06-03 ASSESSMENT — PAIN DESCRIPTION - DESCRIPTORS
DESCRIPTORS: ACHING

## 2022-06-03 ASSESSMENT — PAIN DESCRIPTION - ONSET: ONSET: ON-GOING

## 2022-06-03 ASSESSMENT — PAIN DESCRIPTION - PAIN TYPE: TYPE: ACUTE PAIN

## 2022-06-03 NOTE — H&P
Hospitalist History and Physical   Admit Date:  6/3/2022 12:57 AM   Name:  Marcy Crockett   Age:  80 y.o. Sex:  female  :  1933   MRN:  [id number]     Presenting Complaint: Fall  Reason(s) for Admission: GI bleed [K92.2]     History of Present Illness: Marcy Crockett is a 80 y.o. female with medical history of HTN, DM2 who presented to the [facility] ED after a fall. Patient reports becoming dizzy in her bathroom and falling. She denies any LOC or head injury. Patient has had L sided back pain since then. Upon ER evaluation, CXR shows a \"small left apical pneumothorax\" and \"very small left pleural effusion. \"  Additionally, patient's hgb is 7.0. Hemoccult was obtained which was positive. Hospitalist asked to admit. Patient has been transferred to [facility] for hospitalization. Review of Systems:  10 systems reviewed and negative except as noted in HPI. Assessment & Plan:   Anemia/GI Bleed  - Hgb is 7.0  - Hemoccult positive in ER  - Clear liquid diet  - IV protonix  - Hold aspirin and prednisone  - Recheck hgb  - Type and screen  - Transfuse if <7  - Consult to GI    Pneumothorax  - Small per radiology read  - Normal O2 sat on RA  - Continue to monitor closely  - Consult to Pulmonology to make aware    Leukocytosis  - Mild  - Likely related to recent prednisone usage  - Follow CBC    Asthma  - Home meds  - Holding prednisone due to GI bleed    HTN  - BP stable  - Home meds    DM2   - Not on meds  - Reportedly \"borderline\"    Disposition: inpatient    Past medical history reviewed. Past Medical History:   Diagnosis Date    Asthma     stable    Diabetes ([address]     borderline    GERD (gastroesophageal reflux disease)     well controlled    Hypertension     MVP (mitral valve prolapse)      Past surgical history reviewed. Past Surgical History:   Procedure Laterality Date    ORTHOPEDIC SURGERY      right rotator cuff    OTHER SURGICAL HISTORY      hemorrhoidectomy.      TUBAL LIGATION Allergies   Allergen Reactions    Morphine And Related Nausea And Vomiting    Oxycodone Nausea And Vomiting    Penicillins Rash    Hydrocodone-Acetaminophen Nausea And Vomiting    Meperidine Nausea And Vomiting      Social History     Tobacco Use    Smoking status: Never Smoker    Smokeless tobacco: Never Used   Substance Use Topics    Alcohol use: Yes      Family History   Problem Relation Age of Onset    Stroke Mother     Stroke Father       Family history reviewed and noncontributory to patient's acute condition; no relevant family history unless otherwise noted above. Immunization History   Administered Date(s) Administered    Pneumococcal Vaccine 11/19/2011     PTA Medications:  Current Outpatient Medications   Medication Instructions    acetaminophen (TYLENOL) 650 mg, Oral, EVERY 8 HOURS    albuterol sulfate  (90 Base) MCG/ACT inhaler 2 puffs, Inhalation, EVERY 4 HOURS PRN    amLODIPine (NORVASC) 5 mg, Oral, DAILY    aspirin 81 mg, Oral, DAILY    Multiple Vitamin (MULTIVITAMIN PO) 1 tablet, Oral, DAILY    predniSONE (DELTASONE) 10 mg, Oral, EVERY OTHER DAY    Trulicity 7.68 mg, SubCUTAneous, EVERY 7 DAYS    vitamin D3 (CHOLECALCIFEROL) 5,000 Units, Oral, DAILY       Objective:     Patient Vitals for the past 24 hrs:   Temp Pulse Resp BP SpO2   06/03/22 0111 98.2 °F (36.8 °C) (!) 112 22 (!) 146/80 97 %       Estimated body mass index is 18.02 kg/m² as calculated from the following:    Height as of 6/2/22: 5' 4\" (1.626 m). Weight as of 6/2/22: 105 lb (47.6 kg). No intake or output data in the 24 hours ending 06/03/22 5228      Physical Exam:  General:    Well nourished. No overt distress  Head:  Normocephalic, atraumatic  Eyes:  Sclerae appear normal.  Pupils equally round. HENT:  Nares appear normal, no drainage. Moist mucous membranes  Neck:  No restricted ROM. Trachea midline  CV:   Tachycardic. S1/S2 auscultated  Lungs:   CTAB. No wheezing, rhonchi, or rales.   Appears even, unlabored  Abdomen: Bowel sounds present. Soft, nontender, nondistended. Extremities: Warm and dry. No cyanosis or clubbing. No edema. Skin:     No rashes. Normal turgor. Normal coloration  Neuro:  Cranial nerves II-XII grossly intact. Sensation intact  Psych:  Normal mood and affect.   Alert and oriented x3    Data Ordered and Personally Reviewed:    Last 24hr Labs:  Recent Results (from the past 24 hour(s))   Urinalysis w rflx microscopic    Collection Time: 06/02/22  9:57 PM   Result Value Ref Range    Color, UA YELLOW      Appearance CLEAR      Specific Gravity, UA 1.020 1.001 - 1.023      pH, Urine 6.5 5.0 - 9.0      Protein, UA Negative NEG mg/dL    Glucose, UA >1000 mg/dL    Ketones, Urine TRACE (A) NEG mg/dL    Bilirubin Urine Negative NEG      Blood, Urine Trace Intact (A) NEG      Urobilinogen, Urine 0.2 0.2 - 1.0 EU/dL    Nitrite, Urine Negative NEG      Leukocyte Esterase, Urine SMALL (A) NEG     CBC with Auto Differential    Collection Time: 06/02/22  9:57 PM   Result Value Ref Range    WBC 15.9 (H) 4.3 - 11.1 K/uL    RBC 2.79 (L) 4.05 - 5.20 M/uL    Hemoglobin 7.0 (L) 11.7 - 15.4 g/dL    Hematocrit 22.9 (L) 35.8 - 46.3 %    MCV 82.1 79.6 - 97.8 FL    MCH 25.1 (L) 26.1 - 32.9 PG    MCHC 30.6 (L) 31.4 - 35.0 g/dL    RDW 15.1 (H) 11.9 - 14.6 %    Platelets 345 798 - 822 K/uL    MPV 9.9 9.4 - 12.3 FL    nRBC 0.00 0.0 - 0.2 K/uL    Differential Type AUTOMATED      Seg Neutrophils 87 (H) 43 - 78 %    Lymphocytes 7 (L) 13 - 44 %    Monocytes 5 4.0 - 12.0 %    Eosinophils % 0 (L) 0.5 - 7.8 %    Basophils 0 0.0 - 2.0 %    Immature Granulocytes 0 0.0 - 5.0 %    Segs Absolute 13.9 (H) 1.7 - 8.2 K/UL    Absolute Lymph # 1.1 0.5 - 4.6 K/UL    Absolute Mono # 0.8 0.1 - 1.3 K/UL    Absolute Eos # 0.0 0.0 - 0.8 K/UL    Basophils Absolute 0.0 0.0 - 0.2 K/UL    Absolute Immature Granulocyte 0.1 0.0 - 0.5 K/UL   Comprehensive Metabolic Panel    Collection Time: 06/02/22  9:57 PM   Result Value Ref Range    Sodium 135 (L) 136 - 145 mmol/L    Potassium 4.2 3.5 - 5.1 mmol/L    Chloride 95 (L) 98 - 107 mmol/L    CO2 22 21 - 32 mmol/L    Anion Gap 18 (H) 7.0 - 16.0 mmol/L    Glucose 389 (H) 65 - 100 mg/dL    BUN 29 (H) 7.0 - 18.0 MG/DL    CREATININE 1.02 (H) 0.6 - 1.0 MG/DL    GFR African American >66 >60 ml/min/1.73m2    GFR Non- 54 (L) >60 ml/min/1.73m2    Calcium 9.1 8.3 - 10.4 MG/DL    Total Bilirubin 0.4 0.2 - 1.1 MG/DL    ALT 14 13.0 - 61.0 U/L    AST 21 15 - 37 U/L    Alk Phosphatase 63 45.0 - 117.0 U/L    Total Protein 6.2 (L) 6.4 - 8.2 g/dL    Albumin 3.9 3.2 - 4.6 g/dL    Globulin 2.3 2.3 - 3.5 g/dL    Albumin/Globulin Ratio 1.7     CK    Collection Time: 06/02/22  9:57 PM   Result Value Ref Range    Total  21 - 215 U/L   Urinalysis, Micro    Collection Time: 06/02/22  9:57 PM   Result Value Ref Range    WBC, UA 3-5 0 /hpf    RBC, UA 0-3 0 /hpf    Epithelial Cells UA 0 0 /hpf    BACTERIA, URINE 1+ (H) 0 /hpf    Casts 0 0 /lpf    Crystals 0 0 /LPF    Mucus, UA 0 0 /lpf    OTHER OBSERVATIONS RESULTS VERIFIED MANUALLY     Protime-INR    Collection Time: 06/02/22 11:15 PM   Result Value Ref Range    Protime 15.9 (H) 12.6 - 14.5 sec    INR 1.2     Hemoglobin    Collection Time: 06/03/22  3:39 AM   Result Value Ref Range    Hemoglobin 7.0 (L) 11.7 - 15.4 g/dL         Signed:  Nicho Holcomb MD

## 2022-06-03 NOTE — CARE COORDINATION
Interdisciplinary team rounds with Dr Alia Nunez, CM nursing, PT and dietary for plan of care. Patient admitted after a fall and CXR showed pneumothorax and pleural effusion with multiple rib fractures. Patient is to be d/c to [address] [place] [address] for continued care. Patient not able to be assessed by CM due to clinical status. Patient to d/c to [facility].

## 2022-06-03 NOTE — PROGRESS NOTES
Per Dr. Kelsey Villegas patient to be moved to [facility]. Will monitor patient after cxr shows worsening atelectasis and pneumothorax.

## 2022-06-03 NOTE — PLAN OF CARE
Admission assessment complete. Patient arrived alert and oriented. Respirations are even and shallow. Patient is on 3 liters of oxygen via nasal cannula. Bowel sounds are present. Patient's HR is elevated, will monitor. Patient is on telemetry box number [id number]. Per monitor room patient has had runs of SVT this evening. Notified Dr. Shay Schreiber via [employer]. Normal saline infusing at 75 ml/hr per order. Patient has a wound on left foot, she states she cut in on her Rolator about a week ago. Dressed with Optifoam dressing. Sacrum was only slightly red, Opti foam dressing present. Patient has scattered bruising all over. Aware of clear liquid diet. Purewick in place. Oriented to call light functions. Bed low and locked, call light within reach.   _____________________________________________________       06/03/22 0100   Dual Clinician Skin Assessment   Dual Skin Assessment (4 Eyes) WDL   Second Clinical  (First and Last Name) Aries Fischer, RN   Skin Integumentary    Skin Integumentary (WDL) X   Skin Color Ecchymosis (comment)  (scattered)   Skin Integrity Abrasion; Wound (see LDA)  (left foot. )   _______________________________________________________      Problem: Safety - Adult  Goal: Free from fall injury  Outcome: Progressing     Problem: Skin/Tissue Integrity  Goal: Absence of new skin breakdown  Description: 1. Monitor for areas of redness and/or skin breakdown  2. Assess vascular access sites hourly  3. Every 4-6 hours minimum:  Change oxygen saturation probe site  4. Every 4-6 hours:  If on nasal continuous positive airway pressure, respiratory therapy assess nares and determine need for appliance change or resting period.   Outcome: Progressing     Problem: ABCDS Injury Assessment  Goal: Absence of physical injury  Outcome: Progressing

## 2022-06-03 NOTE — CONSULTS
[facility] Consult Note       Primary GI Physician: Dr. Nilsa Christiansen ([facility])    Referring Provider: Dr. Gandhi Stage Date:  6/3/2022    Admit Date:  6/3/2022    Chief Complaint: Anemia, Hemoccult positive     Subjective:     History of Present Illness:  Patient is a 80 y.o. female with PMH including but not limited to HTN, DM2, GERD, MVP who is seen in consultation on 6/3/22 at the request of Dr. Dilia Quevedo for anemia and hemoccult positive stool in ER. Patient presented to the [facility] ED after a fall. Patient reports becoming dizzy in her bathroom and falling. She denies any LOC or head injury. Patient has had L sided back pain since then. Upon ER evaluation, CXR shows a \"small left apical pneumothorax\" and \"very small left pleural effusion. \"  Additionally, patient's hgb is 7.0. Hemoccult was obtained which was positive. Hospitalist asked to admit. Patient was transferred to [facility] for hospitalization. Patient reports she has not seen a Gastroenterologist in the past and denies Hx of anemia. Denies use of NSAIDs, ETOH, or smoking. She uses Tylenol for pain. She has nausea, last emesis reported as yesterday. She reports she takes Omeprazole daily at home for reflux. She denies hematemesis, abdominal pain, hematochezia, or melena. She denies any endoscopic procedures in the past. States \"well I am 80years old and I am not interested in any procedure. \" She denies blood thinners. PMH:  Past Medical History:   Diagnosis Date    Asthma     stable    [address] [address]     borderline    GERD (gastroesophageal reflux disease)     well controlled    Hypertension     MVP (mitral valve prolapse)        PSH:  Past Surgical History:   Procedure Laterality Date    ORTHOPEDIC SURGERY      right rotator cuff    OTHER SURGICAL HISTORY      hemorrhoidectomy.  TUBAL LIGATION         Allergies:   Allergies   Allergen Reactions    Morphine And Related Nausea And Vomiting    Oxycodone Nausea And Vomiting    Penicillins Rash    Hydrocodone-Acetaminophen Nausea And Vomiting    Meperidine Nausea And Vomiting       Home Medications:  Prior to Admission medications    Medication Sig Start Date End Date Taking?  Authorizing Provider   Multiple Vitamin (MULTIVITAMIN PO) Take 1 tablet by mouth daily    Ar Automatic Reconciliation   acetaminophen (TYLENOL) 325 MG tablet Take 650 mg by mouth every 8 hours 11/21/11   Ar Automatic Reconciliation   albuterol sulfate  (90 Base) MCG/ACT inhaler Inhale 2 puffs into the lungs every 4 hours as needed 5/6/11   Ar Automatic Reconciliation   amLODIPine (NORVASC) 5 MG tablet Take 5 mg by mouth daily 12/15/21   Ar Automatic Reconciliation   aspirin 81 MG chewable tablet Take 81 mg by mouth daily    Ar Automatic Reconciliation   vitamin D3 (CHOLECALCIFEROL) 125 MCG (5000 UT) TABS tablet Take 5,000 Units by mouth daily    Ar Automatic Reconciliation   Dulaglutide (TRULICITY) 3.78 US/0.7QS SOPN Inject 0.75 mg into the skin every 7 days    Ar Automatic Reconciliation   predniSONE (DELTASONE) 10 MG tablet Take 10 mg by mouth every other day    Ar Automatic Reconciliation       Hospital Medications:  Current Facility-Administered Medications   Medication Dose Route Frequency    albuterol sulfate  (90 Base) MCG/ACT inhaler 2 puff  2 puff Inhalation Q4H PRN    amLODIPine (NORVASC) tablet 5 mg  5 mg Oral Daily    Vitamin D (CHOLECALCIFEROL) tablet 5,000 Units  5,000 Units Oral Daily    0.9 % sodium chloride infusion   IntraVENous PRN    ondansetron (ZOFRAN-ODT) disintegrating tablet 4 mg  4 mg Oral Q8H PRN    Or    ondansetron (ZOFRAN) injection 4 mg  4 mg IntraVENous Q6H PRN    polyethylene glycol (GLYCOLAX) packet 17 g  17 g Oral Daily PRN    acetaminophen (TYLENOL) tablet 650 mg  650 mg Oral Q6H PRN    Or    acetaminophen (TYLENOL) suppository 650 mg  650 mg Rectal Q6H PRN    0.9 % sodium chloride infusion   IntraVENous 82.1  --   --    *  --  135*   K 4.2  --  3.9   CL 95*  --  99   CO2 22  --  25   BUN 29*  --  24*   AST 21  --   --    ALT 14  --   --    INR  --  1.2  --       CT of head without contrast 6/2/22  1. No CT evidence of acute intracranial process.       2. There is opacification of the left mastoid air cells. This may represent   mastoiditis. No CT evidence of an acute injury. CT cervical spine without contrast 6/2/22  There is mild grade 1 anterolisthesis of C2 on C3. The age of this is unclear.       Otherwise, there is no CT evidence of an acute fracture or traumatic   subluxation.       A small pneumothorax is seen in the left apex. This was seen on the left rib   x-rays. Xray ribs and chest 6/2/22  There are displaced fractures of the left 3rd-6th ribs.       There is a small left apical pneumothorax.       There is a very small left pleural effusion. Assessment:     Principal Problem:    GI bleed  Active Problems:    Pneumothorax    Leukocytosis    Asthma    Aortic valve stenosis    DM (diabetes mellitus) ([address]    Hypertension  Resolved Problems:    * No resolved hospital problems. *    80 y.o. female with PMH including but not limited to HTN, DM2, GERD, MVP who is seen in consultation on 6/3/22 at the request of Dr. Nolan Arteaga for anemia and hemoccult positive stool in ER. Patient became dizzy and fell in her bathroom, presented to the ER, found to have Hgb of 7.0 and hemoccult positive. Hx of GERD, pt and RN denies overt GI bleeding. Being treated for a small pneumothorax, small pleural effusion, on O2 via nc. Pulmonology consulted. Has displaced fractures of the left 3rd-6th ribs. Conservative treatment approach, as there is no overt GI bleeding at this time.      Plan:     - Continue Protonix 40 mg IV BID  - Monitor H & H, transfuse PRN for Hgb < 7  - Avoid NSAIDs  - Monitor for overt GI bleeding  - Clear liquid diet, no reds  - Following     MIRIAN Chambers  [facility]    Patient is seen and examined in collaboration with Dr. Corky Linares. Assessment and plan as per Dr. Corky Linares.

## 2022-06-03 NOTE — ED TRIAGE NOTES
Patient presents via EMS from home after having a fall around 1300 today after getting out of the shower. Patient states she laid in the floor until around 1800 when her daughter got home. Patient now c/o lower back pain and left sided rib pain. Denies any LOC, blood thinners, or hitting her head. Patient states she was not able to take any of her medications today so her BGL and BP are elevated. Per EMS blood glucose is 449.

## 2022-06-03 NOTE — DISCHARGE SUMMARY
Hospitalist Discharge Summary   Admit Date:  6/3/2022 12:57 AM   DC Note date: 6/3/2022  Name:  Brooke Sen   Age:  80 y.o. Sex:  female  :  1933   MRN:  [id number]   Room:  [facility]/  PCP:  PRINCE Yeh CNP    Presenting Complaint: No chief complaint on file. Initial Admission Diagnosis: GI bleed [K92.2]     Problem List for this Hospitalization (present on admission):    Principal Problem:    Acute blood loss anemia (ABLA)  Active Problems:    GI bleed    Traumatic pneumothorax    Leukocytosis    Asthma    Aortic valve stenosis    Type 2 diabetes mellitus ([facility] [address]    Hypertension  Resolved Problems:    * No resolved hospital problems. *    Did Patient have Sepsis (YES OR NO): NO    Hospital Course:  80 y.o. female with medical history of HTN, DM2 who presented to the [facility] ED on 22 after a fall onto her back. She reported becoming dizzy in her bathroom and falling. She denies any LOC or head injury. She had L sided back pain immediately after and since then. Upon ER evaluation, CXR shows a \"small left apical pneumothorax\" and \"very small left pleural effusion. \" with multiple rib fractures. Incidentally, her Hgb was found to be 7.0. Hemoccult was obtained which was positive. Patient was admitted to [facility] for hospitalization. Her Hgb remained stable at 7.0. Repeat CXR showed slightly larger pneumothorax with tracheal shift and rib fractures. Pulmonology reviewed the films and requested transfer to  for possible chest tube. She was stable at the time of discharge. Disposition: To [facility]  Diet: ADULT DIET; Clear Liquid  Code Status: Full Code    Follow up labs/diagnostics (ultimately defer to outpatient provider):  Continued hospital labs    Time spent in patient discharge and coordination 33 minutes. Plan was discussed with patient, family at bedside, nursing, and Pulmonology. All questions answered. Patient was stable at time of discharge.   Instructions given to call a physician or return if any concerns. Current Discharge Medication List      CONTINUE these medications which have NOT CHANGED    Details   Multiple Vitamin (MULTIVITAMIN PO) Take 1 tablet by mouth daily      acetaminophen (TYLENOL) 325 MG tablet Take 650 mg by mouth every 8 hours      albuterol sulfate  (90 Base) MCG/ACT inhaler Inhale 2 puffs into the lungs every 4 hours as needed      amLODIPine (NORVASC) 5 MG tablet Take 5 mg by mouth daily      aspirin 81 MG chewable tablet Take 81 mg by mouth daily      vitamin D3 (CHOLECALCIFEROL) 125 MCG (5000 UT) TABS tablet Take 5,000 Units by mouth daily      Dulaglutide (TRULICITY) 7.00 XQ/9.2GM SOPN Inject 0.75 mg into the skin every 7 days      predniSONE (DELTASONE) 10 MG tablet Take 10 mg by mouth every other day             Procedures done this admission:  * No surgery found *    Consults this admission:  IP CONSULT TO GI  IP CONSULT TO PULMONOLOGY  IP CONSULT TO PULMONOLOGY    Echocardiogram results:  12/02/21    TRANSTHORACIC ECHOCARDIOGRAM (TTE) COMPLETE (CONTRAST/BUBBLE/3D PRN) 12/03/2021  7:46 AM (Final)    Narrative  This is a summary report. The complete report is available in the patient's medical record. If you cannot access the medical record, please contact the sending organization for a detailed fax or copy. · LV: Estimated LVEF is >70%. Normal cavity size. Mild concentric hypertrophy. Hyperdynamic systolic function. Left ventricular diastolic dysfunction. · AV: Aortic valve leaflet calcification present. Aortic valve mean gradient is 21 mmHg. Aortic valve area is 1.6 cm2. Moderate aortic valve stenosis is present. · TV: Mild tricuspid valve regurgitation is present. Right Ventricular Arterial Pressure (RVSP) is 29 mmHg. · LA: Mildly dilated left atrium. · RA: Mildly dilated right atrium. · MV: Mitral valve non-specific thickening. Mild mitral annular calcification. Mild mitral valve regurgitation is present.   · PV: Mild pulmonic valve regurgitation is present. Signed by: Sadia Callahan MD on 12/3/2021  7:46 AM      Diagnostic Imaging/Tests:   XR CHEST (2 VW)    Result Date: 6/3/2022  Chest X-ray COMPARISON: Chest x-ray 11/19/2011. INDICATION: Follow-up pneumothorax. AP and lateral views of the chest were obtained. Lungs are hypoaerated but otherwise clear. Subcutaneous emphysema noted along the left lateral chest wall. Small left apical pneumothorax is present with multiple new rib fracture deformities involving the lateral left third, fourth, fifth and sixth ribs. No evidence of right pneumothorax. The heart size is normal in size. No pleural effusions.           Labs: Results:       BMP, Mg, Phos Recent Labs     06/02/22 2157 06/03/22 0339   * 135*   K 4.2 3.9   CL 95* 99   CO2 22 25   BUN 29* 24*      CBC Recent Labs     06/02/22 2157 06/03/22 0339 06/03/22  1204   WBC 15.9*  --   --    RBC 2.79*  --   --    HGB 7.0* 7.0* 7.0*   HCT 22.9*  --   --      --   --       LFT Recent Labs     06/02/22 2157   ALT 14   GLOB 2.3      Cardiac Testing No results found for: BNP, CPK, RCK1, CKMB   Coagulation Tests Lab Results   Component Value Date    INR 1.2 06/02/2022      A1c No results found for: HBA1C   Lipid Panel No results found for: CHOL, CHOLPOCT, CHOLX, CHLST, CHOLV, 672136, HDL, HDLC, LDL, LDLC, 134704, VLDLC, VLDL, TGLX, TRIGL   Thyroid Panel No results found for: TSH, T4, FT4     Most Recent UA Lab Results   Component Value Date    MUCUS 0 06/02/2022    UCOM RESULTS VERIFIED MANUALLY 06/02/2022          All Labs from Last 24 Hrs:  Recent Results (from the past 24 hour(s))   Urinalysis w rflx microscopic    Collection Time: 06/02/22  9:57 PM   Result Value Ref Range    Color, UA YELLOW      Appearance CLEAR      Specific Gravity, UA 1.020 1.001 - 1.023      pH, Urine 6.5 5.0 - 9.0      Protein, UA Negative NEG mg/dL    Glucose, UA >1000 mg/dL    Ketones, Urine TRACE (A) NEG mg/dL    Bilirubin Urine Negative NEG Blood, Urine Trace Intact (A) NEG      Urobilinogen, Urine 0.2 0.2 - 1.0 EU/dL    Nitrite, Urine Negative NEG      Leukocyte Esterase, Urine SMALL (A) NEG     CBC with Auto Differential    Collection Time: 06/02/22  9:57 PM   Result Value Ref Range    WBC 15.9 (H) 4.3 - 11.1 K/uL    RBC 2.79 (L) 4.05 - 5.20 M/uL    Hemoglobin 7.0 (L) 11.7 - 15.4 g/dL    Hematocrit 22.9 (L) 35.8 - 46.3 %    MCV 82.1 79.6 - 97.8 FL    MCH 25.1 (L) 26.1 - 32.9 PG    MCHC 30.6 (L) 31.4 - 35.0 g/dL    RDW 15.1 (H) 11.9 - 14.6 %    Platelets 925 651 - 730 K/uL    MPV 9.9 9.4 - 12.3 FL    nRBC 0.00 0.0 - 0.2 K/uL    Differential Type AUTOMATED      Seg Neutrophils 87 (H) 43 - 78 %    Lymphocytes 7 (L) 13 - 44 %    Monocytes 5 4.0 - 12.0 %    Eosinophils % 0 (L) 0.5 - 7.8 %    Basophils 0 0.0 - 2.0 %    Immature Granulocytes 0 0.0 - 5.0 %    Segs Absolute 13.9 (H) 1.7 - 8.2 K/UL    Absolute Lymph # 1.1 0.5 - 4.6 K/UL    Absolute Mono # 0.8 0.1 - 1.3 K/UL    Absolute Eos # 0.0 0.0 - 0.8 K/UL    Basophils Absolute 0.0 0.0 - 0.2 K/UL    Absolute Immature Granulocyte 0.1 0.0 - 0.5 K/UL   Comprehensive Metabolic Panel    Collection Time: 06/02/22  9:57 PM   Result Value Ref Range    Sodium 135 (L) 136 - 145 mmol/L    Potassium 4.2 3.5 - 5.1 mmol/L    Chloride 95 (L) 98 - 107 mmol/L    CO2 22 21 - 32 mmol/L    Anion Gap 18 (H) 7.0 - 16.0 mmol/L    Glucose 389 (H) 65 - 100 mg/dL    BUN 29 (H) 7.0 - 18.0 MG/DL    CREATININE 1.02 (H) 0.6 - 1.0 MG/DL    GFR African American >66 >60 ml/min/1.73m2    GFR Non- 54 (L) >60 ml/min/1.73m2    Calcium 9.1 8.3 - 10.4 MG/DL    Total Bilirubin 0.4 0.2 - 1.1 MG/DL    ALT 14 13.0 - 61.0 U/L    AST 21 15 - 37 U/L    Alk Phosphatase 63 45.0 - 117.0 U/L    Total Protein 6.2 (L) 6.4 - 8.2 g/dL    Albumin 3.9 3.2 - 4.6 g/dL    Globulin 2.3 2.3 - 3.5 g/dL    Albumin/Globulin Ratio 1.7     CK    Collection Time: 06/02/22  9:57 PM   Result Value Ref Range    Total  21 - 215 U/L   Urinalysis, Micro Collection Time: 06/02/22  9:57 PM   Result Value Ref Range    WBC, UA 3-5 0 /hpf    RBC, UA 0-3 0 /hpf    Epithelial Cells UA 0 0 /hpf    BACTERIA, URINE 1+ (H) 0 /hpf    Casts 0 0 /lpf    Crystals 0 0 /LPF    Mucus, UA 0 0 /lpf    OTHER OBSERVATIONS RESULTS VERIFIED MANUALLY     Protime-INR    Collection Time: 06/02/22 11:15 PM   Result Value Ref Range    Protime 15.9 (H) 12.6 - 14.5 sec    INR 1.2     TYPE AND SCREEN    Collection Time: 06/03/22  3:33 AM   Result Value Ref Range    Crossmatch expiration date 06/06/2022,8897     ABO/Rh O POSITIVE     Antibody Screen NEG    Basic Metabolic Panel w/ Reflex to MG    Collection Time: 06/03/22  3:39 AM   Result Value Ref Range    Sodium 135 (L) 136 - 145 mmol/L    Potassium 3.9 3.5 - 5.1 mmol/L    Chloride 99 98 - 107 mmol/L    CO2 25 21 - 32 mmol/L    Anion Gap 11 7 - 16 mmol/L    Glucose 273 (H) 65 - 100 mg/dL    BUN 24 (H) 8 - 23 MG/DL    CREATININE 1.14 (H) 0.6 - 1.0 MG/DL    GFR African American 58 (L) >60 ml/min/1.73m2    GFR Non- 48 (L) >60 ml/min/1.73m2    Calcium 8.5 8.3 - 10.4 MG/DL   Hemoglobin    Collection Time: 06/03/22  3:39 AM   Result Value Ref Range    Hemoglobin 7.0 (L) 11.7 - 15.4 g/dL   Hemoglobin    Collection Time: 06/03/22 12:04 PM   Result Value Ref Range    Hemoglobin 7.0 (L) 11.7 - 15.4 g/dL       Allergies   Allergen Reactions    Morphine And Related Nausea And Vomiting    Oxycodone Nausea And Vomiting    Penicillins Rash    Lidocaine Other (See Comments)     \"Paralysis\"    Hydrocodone-Acetaminophen Nausea And Vomiting    Meperidine Nausea And Vomiting     Immunization History   Administered Date(s) Administered    Pneumococcal Vaccine 11/19/2011       Recent Vital Data:  Patient Vitals for the past 24 hrs:   Temp Pulse Resp BP SpO2   06/03/22 1446 99.1 °F (37.3 °C) (!) 102 18 (!) 146/56 100 %   06/03/22 1408 -- (!) 109 15 -- 100 %   06/03/22 1111 98.6 °F (37 °C) 99 16 (!) 152/67 100 %   06/03/22 0725 97.7 °F (36.5 °C) (!) 104 16 126/67 99 %   06/03/22 0438 98.1 °F (36.7 °C) (!) 103 22 (!) 131/50 99 %   06/03/22 0111 98.2 °F (36.8 °C) (!) 112 22 (!) 146/80 97 %       Oxygen Therapy  SpO2: 100 %  O2 Device: Nasal cannula  O2 Flow Rate (L/min): 6 L/min    Estimated body mass index is 17.85 kg/m² as calculated from the following:    Height as of this encounter: 5' 4\" (1.626 m). Weight as of this encounter: 104 lb (47.2 kg). Intake/Output Summary (Last 24 hours) at 6/3/2022 1454  Last data filed at 6/3/2022 0725  Gross per 24 hour   Intake --   Output 300 ml   Net -300 ml         Physical Exam:  General:    Well nourished. Moderate pain distress  Head:  Normocephalic, atraumatic  Eyes:  Sclerae appear normal.  Pupils equally round. HENT:  Nares appear normal, no drainage. Moist mucous membranes  Neck:  No restricted ROM. Trachea midline  CV:   RRR. No m/r/g. No JVD  Lungs:   CTAB. No wheezing, rhonchi, or rales. Even, unlabored  Abdomen:   Soft, nontender, nondistended. Extremities: Warm and dry. No cyanosis or clubbing. No edema. Skin:     No rashes. Normal coloration  Neuro:  CN II-XII grossly intact. Psych:  Normal mood and affect. Signed:  Kel Agrawal DO    Part of this note may have been written by using a voice dictation software. The note has been proof read but may still contain some grammatical/other typographical errors.

## 2022-06-03 NOTE — PROGRESS NOTES
Patients chart reviewed, labs reviewed, notes reviewed. Patient assessed at bedside. Patients DIS score and sepsis score WDL at this time. Patient requested to be followed by night RN. Patient is medically stable, A&Ox4, and her assessment is unremarkable with the exception of decreased air movement r/t shallow breathing r/t painful inspirations, and a mild heart murmur and borderline tachycardia. These are expected findings in patient with this diagnosis and problem list. Patient does not need to be followed by RN Outreach at this time. Will DC following patient, but will be available as needed.

## 2022-06-03 NOTE — PROGRESS NOTES
OUTREACH PROGRESS NOTE    Ismael Cotto was seen and assessed secondary to nurse concern. Patient is alert and oriented and complaining of pain on left side. Current Vitals:  Temperature 98.2 oral  Heart Rate 112  Respirations 22  Blood Pressure 146/80  SpO2 97% (3 L/min nasal cannula)    Plan of care discussed with primary nurse.  Will continue to monitor per outreach protocols    José Miguel Lemus RN

## 2022-06-03 NOTE — PLAN OF CARE
Arrival to floor from [address] denies sob with nc in place tele applied daughter at bedside monitoring as ordered

## 2022-06-03 NOTE — ED NOTES
TRANSFER - OUT REPORT:    Verbal report given to Mony Myrick  on [facility]  being transferred to [facility] at [facility] for routine progression of patient care       Report consisted of patient's Situation, Background, Assessment and   Recommendations(SBAR). Information from the following report(s) Nurse Handoff Report, ED Encounter Summary, ED SBAR, [facility], Recent Results and Cardiac Rhythm ST was reviewed with the receiving nurse. Lines:   Peripheral IV 06/02/22 Left Antecubital (Active)        Opportunity for questions and clarification was provided.       Patient transported with:  Monitor and O2 @ 2lpm     Kati Das RN  06/03/22 0002

## 2022-06-03 NOTE — PROGRESS NOTES
Pt is resting quietly in bed. Respirations even and unlabored on 3L NC. Family at bedside. No signs of distress noted at this time. Call light within reach. Will continue to monitor.

## 2022-06-03 NOTE — ED PROVIDER NOTES
[facility] Emergency Department Provider Note                   PCP:                Ra Bernal APRN - DARIEL               Age: 80 y.o. Sex: female       ICD-10-CM    1. Fall on same level, initial encounter  W18.30XA    2. Traumatic pneumothorax, initial encounter  S27. 0XXA    3. Closed fracture of multiple ribs of left side, initial encounter  S22.42XA    4. Anemia, unspecified type  D64.9    5.  Gastrointestinal hemorrhage, unspecified gastrointestinal hemorrhage type  K92.2        DISPOSITION         New Prescriptions    No medications on file       Orders Placed This Encounter   Procedures    Critical Care    XR RIBS LEFT INCLUDE CHEST (MIN 3 VIEWS)    XR LUMBAR SPINE (2-3 VIEWS)    XR HIP 2-3 VW W PELVIS LEFT    CT HEAD WO CONTRAST    CT CERVICAL SPINE WO CONTRAST    XR HIP RIGHT (1 VIEW)    Urinalysis w rflx microscopic    CBC with Auto Differential    Comprehensive Metabolic Panel    CK    Protime-INR    Urinalysis, Micro    EKG 12 Lead        MDM  Number of Diagnoses or Management Options  Anemia, unspecified type  Closed fracture of multiple ribs of left side, initial encounter  Fall on same level, initial encounter  Gastrointestinal hemorrhage, unspecified gastrointestinal hemorrhage type  Traumatic pneumothorax, initial encounter  Diagnosis management comments: Sprain, strain, tendon injury, contusion,    Abrasion, laceration, neurovascular injury, foreign body    Fracture, open fracture, dislocation, joint separation, articular surface injury,    TIA, CVA, ischemic stroke, Bell's palsy, intracranial hemorrhage,  subdural hematoma, subarachnoid hemorrhage,    tension headache, migraine headache, brain tumor, cluster headache, sinusitis    electrolyte abnormality, renal failure, malignant hypertension, UTI               Amount and/or Complexity of Data Reviewed  Clinical lab tests: reviewed and ordered  Tests in the radiology section of CPT®: ordered and reviewed  Tests in the medicine section of CPT®: ordered and reviewed  Obtain history from someone other than the patient: yes  Review and summarize past medical records: yes  Independent visualization of images, tracings, or specimens: yes         Trinity Sanchez is a 80 y.o. female who presents to the Emergency Department with chief complaint of    Chief Complaint   Patient presents with    Fall      Patient is an 80-year-old female presenting to the emergency department today after a fall in the bathroom. The patient said that she got dizzy but did not pass out. The patient says she fell backwards in the bathroom and does not think she hit anything on the way down. This is around 1300 hrs. and she was able to scoot herself out of the bathroom into the living room but could not get herself out of the floor. Her daughter came home around 1800 hrs. and found her there. Patient is complaining of left-sided back pain. She is currently seeing a pain management doctor but not on any narcotic pain medications. The patient denies any abdominal pain nausea vomiting. All other systems reviewed and are negative. Review of Systems   Musculoskeletal: Positive for back pain. All other systems reviewed and are negative. Past Medical History:   Diagnosis Date    Asthma     stable    Diabetes ([facility] [address]     borderline    GERD (gastroesophageal reflux disease)     well controlled    Hypertension     MVP (mitral valve prolapse)         Past Surgical History:   Procedure Laterality Date    ORTHOPEDIC SURGERY      right rotator cuff    OTHER SURGICAL HISTORY      hemorrhoidectomy.      TUBAL LIGATION          Family History   Problem Relation Age of Onset    Stroke Mother     Stroke Father            Social Connections:     Frequency of Communication with Friends and Family: Not on file    Frequency of Social Gatherings with Friends and Family: Not on file    Attends Muslim Services: Not on file   [employer] or [employer]: Not on file    Attends Club or Organization Meetings: Not on file    Marital Status: Not on file        Allergies   Allergen Reactions    Morphine And Related Nausea And Vomiting    Oxycodone Nausea And Vomiting    Penicillins Rash    Hydrocodone-Acetaminophen Nausea And Vomiting    Meperidine Nausea And Vomiting        Vitals signs and nursing note reviewed. Patient Vitals for the past 4 hrs:   Temp Pulse Resp BP SpO2   06/02/22 2310 -- -- -- -- 96 %   06/02/22 2307 -- -- -- -- (!) 87 %   06/02/22 2301 -- -- -- (!) 171/69 --   06/02/22 2300 -- -- -- -- 93 %   06/02/22 2251 -- -- -- (!) 155/68 (!) 89 %   06/02/22 2200 -- -- -- -- 96 %   06/02/22 2155 -- -- -- -- 97 %   06/02/22 2030 -- -- -- -- 95 %   06/02/22 2010 -- -- -- (!) 155/76 93 %   06/02/22 2009 98.1 °F (36.7 °C) (!) 101 18 (!) 155/76 93 %          Physical Exam     GENERAL:The patient has Body mass index is 18.02 kg/m². Well-hydrated. VITAL SIGNS: Heart rate, blood pressure, respiratory rate reviewed as recorded in  nurse's notes  HEAD: negative sanchez and raccoon sign. No scalp hematomas or lacerations appreciated. EYES: Pupils reactive. Extraocular motion intact. No conjunctival redness or drainage. EARS: No external masses or lesions. External auditory canals are clear with no  hemotympanum  NOSE: No nasal drainage or epistaxis. No septal hematoma noted  MOUTH/THROAT: Pharynx clear; airway patent. No loose dentition or intraoral  lacerations. Floor the mouth is soft. NECK: Supple, no meningeal signs. Trachea midline. No masses or thyromegaly. C-  collar in place No step-off deformities noted  LUNGS: Breath sounds clear and equal bilaterally no accessory muscle use  CHEST: No deformity, no crepitus. CARDIOVASCULAR: Sinus tachycardia no gallops or rubs appreciated. Slight murmur noted. ABDOMEN: Soft without tenderness. No palpable masses or organomegaly. No  peritoneal signs. No rigidity.   RECTAL: Performed with female nursing staff present. Positive fecal occult blood noted. BACK: Tender to palpation in the posterior ribs on the left. PELVIS: stable no laxity  EXTREMITIES: No clubbing or cyanosis. No joint swelling. Normal muscle tone. No  restricted range of motion appreciated. NEUROLOGIC: Sensation is grossly intact. Cranial nerve exam reveals face is  symmetrical, tongue is midline speech is clear. SKIN: No rash or petechiae. Good skin turgor palpated. PSYCHIATRIC: Alert and oriented. Appropriate behavior and judgment. Critical Care  Performed by: Kaylah Cline DO  Authorized by: Kaylah Cline DO     Comments:      Critical care time: 79 minutes of critical care time was performed in the emergency department. This was separate from any other procedures listed during the patients emergency department course. The failure to initiate these interventions on an urgent basis would likely have resulted in sudden, clinically significant or life-threatening deterioration in the patients condition. EKG 12 Lead    Date/Time: 6/2/2022 11:33 PM  Performed by: Kaylah Cline DO  Authorized by: Kaylah Cline DO     ECG reviewed by ED Physician in the absence of a cardiologist: yes    Comments:      Sinus tachycardia rate of 118 bpm.  Narrow QRS complex. No ST elevation present.         Labs Reviewed   URINALYSIS - Abnormal; Notable for the following components:       Result Value    Ketones, Urine TRACE (*)     Blood, Urine Trace Intact (*)     Leukocyte Esterase, Urine SMALL (*)     All other components within normal limits   CBC WITH AUTO DIFFERENTIAL - Abnormal; Notable for the following components:    WBC 15.9 (*)     RBC 2.79 (*)     Hemoglobin 7.0 (*)     Hematocrit 22.9 (*)     MCH 25.1 (*)     MCHC 30.6 (*)     RDW 15.1 (*)     Seg Neutrophils 87 (*)     Lymphocytes 7 (*)     Eosinophils % 0 (*)     Segs Absolute 13.9 (*)     All other components within normal limits   COMPREHENSIVE METABOLIC PANEL - Abnormal; Notable for the following components:    Sodium 135 (*)     Chloride 95 (*)     Anion Gap 18 (*)     Glucose 389 (*)     BUN 29 (*)     CREATININE 1.02 (*)     GFR Non- 54 (*)     Total Protein 6.2 (*)     All other components within normal limits   URINALYSIS, MICRO - Abnormal; Notable for the following components:    BACTERIA, URINE 1+ (*)     All other components within normal limits   CK   PROTIME-INR        XR RIBS LEFT INCLUDE CHEST (MIN 3 VIEWS)   Final Result   There are displaced fractures of the left 3rd-6th ribs. There is a small left apical pneumothorax. There is a very small left pleural effusion. XR LUMBAR SPINE (2-3 VIEWS)   Final Result   No evidence of acute fracture or subluxation of the lumbar spine. Degenerative changes as described above. Note is made of partial compression of the T12 vertebral body. The age of this   is unclear. XR HIP 2-3 VW W PELVIS LEFT   Final Result   No evidence of acute fracture or dislocation of the left hip. CT HEAD WO CONTRAST   Final Result   1. No CT evidence of acute intracranial process. 2. There is opacification of the left mastoid air cells. This may represent   mastoiditis. No CT evidence of an acute injury. CT CERVICAL SPINE WO CONTRAST   Final Result   There is mild grade 1 anterolisthesis of C2 on C3. The age of this is unclear. Otherwise, there is no CT evidence of an acute fracture or traumatic   subluxation. A small pneumothorax is seen in the left apex. This was seen on the left rib   x-rays. XR HIP RIGHT (1 VIEW)    (Results Pending)            Angelica Coma Scale  Eye Opening: Spontaneous  Best Verbal Response: Oriented  Best Motor Response: Obeys commands  [place] Coma Scale Score: 15               ED Course as of 06/02/22 2336   u Jun 02, 202202, 2022 2136 XR HIP 2-3 VW W PELVIS LEFT  IMPRESSION:  No evidence of acute fracture or dislocation of the left hip.  [KH]   2136 XR LUMBAR SPINE (2-3 VIEWS)  IMPRESSION:  No evidence of acute fracture or subluxation of the lumbar spine.     Degenerative changes as described above.     Note is made of partial compression of the T12 vertebral body. The age of this  is unclear. [KH]   2137 XR RIBS LEFT INCLUDE CHEST (MIN 3 VIEWS)  IMPRESSION:  There are displaced fractures of the left 3rd-6th ribs.     There is a small left apical pneumothorax.     There is a very small left pleural effusion. [KH]   3126 CT CERVICAL SPINE WO CONTRAST  IMPRESSION:  There is mild grade 1 anterolisthesis of C2 on C3. The age of this is unclear.     Otherwise, there is no CT evidence of an acute fracture or traumatic  subluxation.     A small pneumothorax is seen in the left apex. This was seen on the left rib  x-rays. [KH]   2258 Hemoglobin Quant(!): 7.0  12.5 in Oct 2021 [KH]   2304 Secondary to patient's anemia rectal exam was performed in the emergency department with female nursing staff present and this was noted to be fecal occult blood positive. Patient will be typed and crossed once she gets to the admitting hospital. [KH]      ED Course User Index  [KH] Victor Manuel Estrada,         Voice dictation software was used during the making of this note. This software is not perfect and grammatical and other typographical errors may be present. This note has not been completely proofread for errors.        Victor Manuel Estrada,   06/02/22 [address], DO  06/02/22 9041

## 2022-06-03 NOTE — PROGRESS NOTES
TRANSFER - OUT REPORT:    Verbal report given to [address] on [facility]  being transferred to 8th floor room 803 for routine progression of patient care       Report consisted of patient's Situation, Background, Assessment and   Recommendations(SBAR). Information from the following report(s) Nurse Handoff Report and Adult Overview was reviewed with the receiving nurse. Lines:   Peripheral IV 06/02/22 Left Antecubital (Active)   Site Assessment Clean, dry & intact 06/03/22 0100   Phlebitis Assessment No symptoms 06/03/22 0100   Infiltration Assessment 0 06/03/22 0100   Alcohol Cap Used Yes 06/03/22 0100   Dressing Status Clean, dry & intact 06/03/22 0100   Dressing Type Transparent 06/03/22 0100        Opportunity for questions and clarification was provided.       Patient transported with:  O2 @ 4lpm

## 2022-06-03 NOTE — PROGRESS NOTES
Chart reviewed since consult received after daily rounds at [place]. Patient has small  Pneumothorax on CT, but there is a larger basilar component (on rib cxr) which should be monitored closely in case a chest tube is needed. Recommend repeat CXR now and may need to consider transfer downtown.     Bridget Carmichael MD

## 2022-06-04 ENCOUNTER — APPOINTMENT (OUTPATIENT)
Dept: GENERAL RADIOLOGY | Age: 87
DRG: 200 | End: 2022-06-04
Attending: FAMILY MEDICINE
Payer: MEDICARE

## 2022-06-04 LAB
ANION GAP SERPL CALC-SCNC: 8 MMOL/L (ref 7–16)
BASOPHILS # BLD: 0 K/UL (ref 0–0.2)
BASOPHILS NFR BLD: 0 % (ref 0–2)
BUN SERPL-MCNC: 19 MG/DL (ref 8–23)
CALCIUM SERPL-MCNC: 8 MG/DL (ref 8.3–10.4)
CHLORIDE SERPL-SCNC: 102 MMOL/L (ref 98–107)
CO2 SERPL-SCNC: 23 MMOL/L (ref 21–32)
CREAT SERPL-MCNC: 0.9 MG/DL (ref 0.6–1)
DIFFERENTIAL METHOD BLD: ABNORMAL
EOSINOPHIL # BLD: 0.1 K/UL (ref 0–0.8)
EOSINOPHIL NFR BLD: 1 % (ref 0.5–7.8)
ERYTHROCYTE [DISTWIDTH] IN BLOOD BY AUTOMATED COUNT: 15 % (ref 11.9–14.6)
GLUCOSE BLD STRIP.AUTO-MCNC: 152 MG/DL (ref 65–100)
GLUCOSE BLD STRIP.AUTO-MCNC: 179 MG/DL (ref 65–100)
GLUCOSE BLD STRIP.AUTO-MCNC: 255 MG/DL (ref 65–100)
GLUCOSE BLD STRIP.AUTO-MCNC: 276 MG/DL (ref 65–100)
GLUCOSE SERPL-MCNC: 201 MG/DL (ref 65–100)
HCT VFR BLD AUTO: 20.6 % (ref 35.8–46.3)
HCT VFR BLD AUTO: 25.3 % (ref 35.8–46.3)
HGB BLD-MCNC: 6.3 G/DL (ref 11.7–15.4)
HGB BLD-MCNC: 8 G/DL (ref 11.7–15.4)
HISTORY CHECK: NORMAL
IMM GRANULOCYTES # BLD AUTO: 0.1 K/UL (ref 0–0.5)
IMM GRANULOCYTES NFR BLD AUTO: 1 % (ref 0–5)
LYMPHOCYTES # BLD: 1.2 K/UL (ref 0.5–4.6)
LYMPHOCYTES NFR BLD: 11 % (ref 13–44)
MAGNESIUM SERPL-MCNC: 1.7 MG/DL (ref 1.8–2.4)
MCH RBC QN AUTO: 24.4 PG (ref 26.1–32.9)
MCHC RBC AUTO-ENTMCNC: 30.6 G/DL (ref 31.4–35)
MCV RBC AUTO: 79.8 FL (ref 79.6–97.8)
MONOCYTES # BLD: 0.5 K/UL (ref 0.1–1.3)
MONOCYTES NFR BLD: 4 % (ref 4–12)
NEUTS SEG # BLD: 9.2 K/UL (ref 1.7–8.2)
NEUTS SEG NFR BLD: 83 % (ref 43–78)
NRBC # BLD: 0 K/UL (ref 0–0.2)
PLATELET # BLD AUTO: 266 K/UL (ref 150–450)
PMV BLD AUTO: 10.4 FL (ref 9.4–12.3)
POTASSIUM SERPL-SCNC: 3.5 MMOL/L (ref 3.5–5.1)
RBC # BLD AUTO: 2.58 M/UL (ref 4.05–5.2)
SERVICE CMNT-IMP: ABNORMAL
SODIUM SERPL-SCNC: 133 MMOL/L (ref 136–145)
WBC # BLD AUTO: 11 K/UL (ref 4.3–11.1)

## 2022-06-04 PROCEDURE — 6370000000 HC RX 637 (ALT 250 FOR IP): Performed by: INTERNAL MEDICINE

## 2022-06-04 PROCEDURE — 6360000002 HC RX W HCPCS: Performed by: INTERNAL MEDICINE

## 2022-06-04 PROCEDURE — P9016 RBC LEUKOCYTES REDUCED: HCPCS

## 2022-06-04 PROCEDURE — 80048 BASIC METABOLIC PNL TOTAL CA: CPT

## 2022-06-04 PROCEDURE — 36430 TRANSFUSION BLD/BLD COMPNT: CPT

## 2022-06-04 PROCEDURE — A4216 STERILE WATER/SALINE, 10 ML: HCPCS | Performed by: INTERNAL MEDICINE

## 2022-06-04 PROCEDURE — 71045 X-RAY EXAM CHEST 1 VIEW: CPT

## 2022-06-04 PROCEDURE — 83735 ASSAY OF MAGNESIUM: CPT

## 2022-06-04 PROCEDURE — 2580000003 HC RX 258: Performed by: INTERNAL MEDICINE

## 2022-06-04 PROCEDURE — 36415 COLL VENOUS BLD VENIPUNCTURE: CPT

## 2022-06-04 PROCEDURE — 99223 1ST HOSP IP/OBS HIGH 75: CPT | Performed by: INTERNAL MEDICINE

## 2022-06-04 PROCEDURE — 1100000003 HC PRIVATE W/ TELEMETRY

## 2022-06-04 PROCEDURE — 82962 GLUCOSE BLOOD TEST: CPT

## 2022-06-04 PROCEDURE — 85018 HEMOGLOBIN: CPT

## 2022-06-04 PROCEDURE — C9113 INJ PANTOPRAZOLE SODIUM, VIA: HCPCS | Performed by: INTERNAL MEDICINE

## 2022-06-04 PROCEDURE — 85025 COMPLETE CBC W/AUTO DIFF WBC: CPT

## 2022-06-04 PROCEDURE — 6370000000 HC RX 637 (ALT 250 FOR IP): Performed by: HOSPITALIST

## 2022-06-04 RX ORDER — PANTOPRAZOLE SODIUM 40 MG/1
40 TABLET, DELAYED RELEASE ORAL
Status: DISCONTINUED | OUTPATIENT
Start: 2022-06-05 | End: 2022-06-13 | Stop reason: HOSPADM

## 2022-06-04 RX ORDER — METOPROLOL SUCCINATE 50 MG/1
25 TABLET, EXTENDED RELEASE ORAL DAILY
Status: DISCONTINUED | OUTPATIENT
Start: 2022-06-04 | End: 2022-06-05

## 2022-06-04 RX ADMIN — INSULIN LISPRO 4 UNITS: 100 INJECTION, SOLUTION INTRAVENOUS; SUBCUTANEOUS at 09:04

## 2022-06-04 RX ADMIN — MORPHINE SULFATE 4 MG: 2 INJECTION, SOLUTION INTRAMUSCULAR; INTRAVENOUS at 07:55

## 2022-06-04 RX ADMIN — MORPHINE SULFATE 4 MG: 2 INJECTION, SOLUTION INTRAMUSCULAR; INTRAVENOUS at 14:03

## 2022-06-04 RX ADMIN — MORPHINE SULFATE 4 MG: 2 INJECTION, SOLUTION INTRAMUSCULAR; INTRAVENOUS at 01:55

## 2022-06-04 RX ADMIN — AMLODIPINE BESYLATE 5 MG: 5 TABLET ORAL at 09:18

## 2022-06-04 RX ADMIN — VITAMIN D, TAB 1000IU (100/BT) 5000 UNITS: 25 TAB at 09:25

## 2022-06-04 RX ADMIN — ONDANSETRON 4 MG: 4 TABLET, ORALLY DISINTEGRATING ORAL at 09:19

## 2022-06-04 RX ADMIN — METOPROLOL SUCCINATE 25 MG: 50 TABLET, EXTENDED RELEASE ORAL at 11:44

## 2022-06-04 RX ADMIN — ONDANSETRON 4 MG: 2 INJECTION INTRAMUSCULAR; INTRAVENOUS at 14:03

## 2022-06-04 RX ADMIN — MORPHINE SULFATE 4 MG: 2 INJECTION, SOLUTION INTRAMUSCULAR; INTRAVENOUS at 18:39

## 2022-06-04 RX ADMIN — SODIUM CHLORIDE 40 MG: 9 INJECTION, SOLUTION INTRAMUSCULAR; INTRAVENOUS; SUBCUTANEOUS at 09:19

## 2022-06-04 RX ADMIN — ONDANSETRON 4 MG: 2 INJECTION INTRAMUSCULAR; INTRAVENOUS at 01:55

## 2022-06-04 ASSESSMENT — PAIN SCALES - GENERAL
PAINLEVEL_OUTOF10: 4
PAINLEVEL_OUTOF10: 8

## 2022-06-04 NOTE — PROGRESS NOTES
Hospitalist Progress Note   Admit Date:  6/3/2022  4:48 PM   Name:  Efren Pedraza   Age:  80 y.o. Sex:  female  :  1933   MRN:  [id number]   Room:  803/    Presenting Complaint: No chief complaint on file. Reason(s) for Admission: Traumatic pneumothorax, initial encounter [S27. [place] Course & Interval History:   80 y.o. female with medical history of HTN, DM2 who presented to the [facility] ED on 22 after a fall onto her back. She reported becoming dizzy in her bathroom and falling. She denies any LOC or head injury. She had L sided back pain immediately after and since then. Upon ER evaluation, CXR shows a \"small left apical pneumothorax\" and \"very small left pleural effusion. \" with multiple rib fractures. Incidentally, her Hgb was found to be 7.0. Hemoccult was obtained which was positive. Patient was admitted to [facility] for hospitalization. Her Hgb remained stable at 7.0. Repeat CXR showed slightly larger pneumothorax with tracheal shift and rib fractures. Pulmonology reviewed the films and requested transfer to  for possible chest tube but decided against it due to the amount of pain it would cause with rib fractures. Subjective/24hr Events (22): Pt says still has a significant amount of pain at rib fracture sites. Worse with movement. No fevers, CP, SOB. Denies bleeding      Assessment & Plan:       Pneumothorax, traumatic  --recheck CXR in AM  -CXR reviewed, stable  -discussed with Pulm  -cont PRN pain meds      GI bleed, ABLA  -No endoscopy. GI to follow up as outpatient  -cont PO iron  -daily hb; improved today. Type 2 diabetes mellitus (HCC)  -uncontrolled  -ADA diet  -ISS      Hypertension  -cont norvasc  -add metoprolol today      Discharge Planning:    -home maybe tomorrow. PT/OT/PPD    Diet:  ADULT DIET;  Clear Liquid  DVT PPx: SCD  Code status: Full Code    Hospital Problems:  Principal Problem:    Pneumothorax, traumatic  Active Problems: GI bleed    Traumatic pneumothorax    Acute blood loss anemia (ABLA)    Aortic valve stenosis    Type 2 diabetes mellitus ([facility] [address]    Hypertension  Resolved Problems:    * No resolved hospital problems. *      Objective:     Patient Vitals for the past 24 hrs:   Temp Pulse Resp BP SpO2   06/04/22 0737 98.1 °F (36.7 °C) (!) 103 20 (!) 148/60 100 %   06/04/22 0415 98.1 °F (36.7 °C) (!) 105 20 (!) 145/59 100 %   06/04/22 0342 97.8 °F (36.6 °C) (!) 104 20 (!) 140/53 100 %   06/04/22 0256 98.6 °F (37 °C) (!) 105 17 (!) 137/56 100 %   06/03/22 2219 98.4 °F (36.9 °C) (!) 107 16 (!) 142/59 100 %   06/03/22 2015 -- (!) 107 -- (!) 124/53 --   06/03/22 2013 -- (!) 102 18 -- 100 %   06/03/22 1944 98.4 °F (36.9 °C) (!) 112 -- (!) 118/54 96 %   06/03/22 1659 97.7 °F (36.5 °C) (!) 106 18 (!) 153/62 100 %       Oxygen Therapy  SpO2: 100 %  O2 Device: Nasal cannula  O2 Flow Rate (L/min): 4 L/min (weaned from 5L)    Estimated body mass index is 17.85 kg/m² as calculated from the following:    Height as of this encounter: 5' 4\" (1.626 m). Weight as of this encounter: 104 lb (47.2 kg). Intake/Output Summary (Last 24 hours) at 6/4/2022 1033  Last data filed at 6/4/2022 0858  Gross per 24 hour   Intake 805 ml   Output --   Net 805 ml         Physical Exam:     Blood pressure (!) 148/60, pulse (!) 103, temperature 98.1 °F (36.7 °C), temperature source Oral, resp. rate 20, height 5' 4\" (1.626 m), weight 104 lb (47.2 kg), SpO2 100 %. General:    Well nourished. Head:  Normocephalic, atraumatic  Eyes:  Sclerae appear normal.  Pupils equally round. ENT:  Nares appear normal, no drainage. Moist oral mucosa  Neck:  No restricted ROM. Trachea midline   CV:   RRR. No jugular venous distension. Lungs:   CTAB. Respirations even, unlabored  Abdomen:    nondistended. Extremities: No cyanosis or clubbing. No edema  Skin:     No rashes and normal coloration. Warm and dry. Neuro:  CN II-XII grossly intact. Sensation intact. A&Ox3  Psych:  Normal mood and affect.       I have reviewed ordered lab tests and independently visualized imaging below:    Recent Labs:  Recent Results (from the past 48 hour(s))   Urinalysis w rflx microscopic    Collection Time: 06/02/22  9:57 PM   Result Value Ref Range    Color, UA YELLOW      Appearance CLEAR      Specific Gravity, UA 1.020 1.001 - 1.023      pH, Urine 6.5 5.0 - 9.0      Protein, UA Negative NEG mg/dL    Glucose, UA >1000 mg/dL    Ketones, Urine TRACE (A) NEG mg/dL    Bilirubin Urine Negative NEG      Blood, Urine Trace Intact (A) NEG      Urobilinogen, Urine 0.2 0.2 - 1.0 EU/dL    Nitrite, Urine Negative NEG      Leukocyte Esterase, Urine SMALL (A) NEG     CBC with Auto Differential    Collection Time: 06/02/22  9:57 PM   Result Value Ref Range    WBC 15.9 (H) 4.3 - 11.1 K/uL    RBC 2.79 (L) 4.05 - 5.20 M/uL    Hemoglobin 7.0 (L) 11.7 - 15.4 g/dL    Hematocrit 22.9 (L) 35.8 - 46.3 %    MCV 82.1 79.6 - 97.8 FL    MCH 25.1 (L) 26.1 - 32.9 PG    MCHC 30.6 (L) 31.4 - 35.0 g/dL    RDW 15.1 (H) 11.9 - 14.6 %    Platelets 608 116 - 632 K/uL    MPV 9.9 9.4 - 12.3 FL    nRBC 0.00 0.0 - 0.2 K/uL    Differential Type AUTOMATED      Seg Neutrophils 87 (H) 43 - 78 %    Lymphocytes 7 (L) 13 - 44 %    Monocytes 5 4.0 - 12.0 %    Eosinophils % 0 (L) 0.5 - 7.8 %    Basophils 0 0.0 - 2.0 %    Immature Granulocytes 0 0.0 - 5.0 %    Segs Absolute 13.9 (H) 1.7 - 8.2 K/UL    Absolute Lymph # 1.1 0.5 - 4.6 K/UL    Absolute Mono # 0.8 0.1 - 1.3 K/UL    Absolute Eos # 0.0 0.0 - 0.8 K/UL    Basophils Absolute 0.0 0.0 - 0.2 K/UL    Absolute Immature Granulocyte 0.1 0.0 - 0.5 K/UL   Comprehensive Metabolic Panel    Collection Time: 06/02/22  9:57 PM   Result Value Ref Range    Sodium 135 (L) 136 - 145 mmol/L    Potassium 4.2 3.5 - 5.1 mmol/L    Chloride 95 (L) 98 - 107 mmol/L    CO2 22 21 - 32 mmol/L    Anion Gap 18 (H) 7.0 - 16.0 mmol/L    Glucose 389 (H) 65 - 100 mg/dL    BUN 29 (H) 7.0 - 18.0 MG/DL    CREATININE 1.02 (H) 0.6 - 1.0 MG/DL    GFR African American >66 >60 ml/min/1.73m2    GFR Non- 54 (L) >60 ml/min/1.73m2    Calcium 9.1 8.3 - 10.4 MG/DL    Total Bilirubin 0.4 0.2 - 1.1 MG/DL    ALT 14 13.0 - 61.0 U/L    AST 21 15 - 37 U/L    Alk Phosphatase 63 45.0 - 117.0 U/L    Total Protein 6.2 (L) 6.4 - 8.2 g/dL    Albumin 3.9 3.2 - 4.6 g/dL    Globulin 2.3 2.3 - 3.5 g/dL    Albumin/Globulin Ratio 1.7     CK    Collection Time: 06/02/22  9:57 PM   Result Value Ref Range    Total  21 - 215 U/L   Urinalysis, Micro    Collection Time: 06/02/22  9:57 PM   Result Value Ref Range    WBC, UA 3-5 0 /hpf    RBC, UA 0-3 0 /hpf    Epithelial Cells UA 0 0 /hpf    BACTERIA, URINE 1+ (H) 0 /hpf    Casts 0 0 /lpf    Crystals 0 0 /LPF    Mucus, UA 0 0 /lpf    OTHER OBSERVATIONS RESULTS VERIFIED MANUALLY     Protime-INR    Collection Time: 06/02/22 11:15 PM   Result Value Ref Range    Protime 15.9 (H) 12.6 - 14.5 sec    INR 1.2     TYPE AND SCREEN    Collection Time: 06/03/22  3:33 AM   Result Value Ref Range    Crossmatch expiration date 06/02/2022,2359     ABO/Rh O POSITIVE     Antibody Screen NEG    Basic Metabolic Panel w/ Reflex to MG    Collection Time: 06/03/22  3:39 AM   Result Value Ref Range    Sodium 135 (L) 136 - 145 mmol/L    Potassium 3.9 3.5 - 5.1 mmol/L    Chloride 99 98 - 107 mmol/L    CO2 25 21 - 32 mmol/L    Anion Gap 11 7 - 16 mmol/L    Glucose 273 (H) 65 - 100 mg/dL    BUN 24 (H) 8 - 23 MG/DL    CREATININE 1.14 (H) 0.6 - 1.0 MG/DL    GFR African American 58 (L) >60 ml/min/1.73m2    GFR Non- 48 (L) >60 ml/min/1.73m2    Calcium 8.5 8.3 - 10.4 MG/DL   Hemoglobin    Collection Time: 06/03/22  3:39 AM   Result Value Ref Range    Hemoglobin 7.0 (L) 11.7 - 15.4 g/dL   Hemoglobin    Collection Time: 06/03/22 12:04 PM   Result Value Ref Range    Hemoglobin 7.0 (L) 11.7 - 15.4 g/dL   Hemoglobin    Collection Time: 06/03/22  8:30 PM   Result Value Ref Range    Hemoglobin 6.4 (LL) 11.7 - 15.4 g/dL   POCT Glucose    Collection Time: 06/03/22  9:02 PM   Result Value Ref Range    POC Glucose 305 (H) 65 - 100 mg/dL    Performed by: Aden    PREPARE RBC (CROSSMATCH), 1 Units    Collection Time: 06/03/22  9:45 PM   Result Value Ref Range    History Check Historical check performed    POCT Glucose    Collection Time: 06/03/22 10:48 PM   Result Value Ref Range    POC Glucose 275 (H) 65 - 100 mg/dL    Performed by: Aden    TYPE AND SCREEN    Collection Time: 06/03/22 11:01 PM   Result Value Ref Range    Crossmatch expiration date 06/06/2022,2359     ABO/Rh O POSITIVE     Antibody Screen NEG     Unit Number [id number]     Product Code Blood Bank RC LR     Unit Divison 00     Dispense Status Blood Bank ISSUED     Crossmatch Result Compatible    Basic Metabolic Panel w/ Reflex to MG    Collection Time: 06/04/22  3:14 AM   Result Value Ref Range    Sodium 133 (L) 136 - 145 mmol/L    Potassium 3.5 3.5 - 5.1 mmol/L    Chloride 102 98 - 107 mmol/L    CO2 23 21 - 32 mmol/L    Anion Gap 8 7 - 16 mmol/L    Glucose 201 (H) 65 - 100 mg/dL    BUN 19 8 - 23 MG/DL    CREATININE 0.90 0.6 - 1.0 MG/DL    GFR African American >60 >60 ml/min/1.73m2    GFR Non- >60 >60 ml/min/1.73m2    Calcium 8.0 (L) 8.3 - 10.4 MG/DL   CBC with Auto Differential    Collection Time: 06/04/22  3:14 AM   Result Value Ref Range    WBC 11.0 4.3 - 11.1 K/uL    RBC 2.58 (L) 4.05 - 5.2 M/uL    Hemoglobin 6.3 (LL) 11.7 - 15.4 g/dL    Hematocrit 20.6 (L) 35.8 - 46.3 %    MCV 79.8 79.6 - 97.8 FL    MCH 24.4 (L) 26.1 - 32.9 PG    MCHC 30.6 (L) 31.4 - 35.0 g/dL    RDW 15.0 (H) 11.9 - 14.6 %    Platelets 616 288 - 042 K/uL    MPV 10.4 9.4 - 12.3 FL    nRBC 0.00 0.0 - 0.2 K/uL    Differential Type AUTOMATED      Seg Neutrophils 83 (H) 43 - 78 %    Lymphocytes 11 (L) 13 - 44 %    Monocytes 4 4.0 - 12.0 %    Eosinophils % 1 0.5 - 7.8 %    Basophils 0 0.0 - 2.0 %    Immature Granulocytes 1 0.0 - 5.0 %    Segs Absolute 9.2 (H) 1.7 - 8.2 K/UL    Absolute Lymph # 1.2 0.5 - 4.6 K/UL    Absolute Mono # 0.5 0.1 - 1.3 K/UL    Absolute Eos # 0.1 0.0 - 0.8 K/UL    Basophils Absolute 0.0 0.0 - 0.2 K/UL    Absolute Immature Granulocyte 0.1 0.0 - 0.5 K/UL   Magnesium    Collection Time: 06/04/22  3:14 AM   Result Value Ref Range    Magnesium 1.7 (L) 1.8 - 2.4 mg/dL   POCT Glucose    Collection Time: 06/04/22  7:39 AM   Result Value Ref Range    POC Glucose 276 (H) 65 - 100 mg/dL    Performed by: Shey    Hemoglobin and Hematocrit    Collection Time: 06/04/22  7:45 AM   Result Value Ref Range    Hemoglobin 8.0 (L) 11.7 - 15.4 g/dL    Hematocrit 25.3 (L) 35.8 - 46.3 %       Other Studies:  XR CHEST (2 VW)    Result Date: 6/3/2022  Chest X-ray COMPARISON: Chest x-ray 11/19/2011. INDICATION: Follow-up pneumothorax. AP and lateral views of the chest were obtained. Lungs are hypoaerated but otherwise clear. Subcutaneous emphysema noted along the left lateral chest wall. Small left apical pneumothorax is present with multiple new rib fracture deformities involving the lateral left third, fourth, fifth and sixth ribs. No evidence of right pneumothorax. The heart size is normal in size. No pleural effusions. XR RIBS LEFT INCLUDE CHEST (MIN 3 VIEWS)    Result Date: 6/2/2022  EXAM: Left ribs HISTORY: fall. TECHNIQUE: AP and oblique views of the left ribs were submitted. A single frontal view of the chest was submitted. COMPARISON: Multiple prior studies with most recent on FINDINGS: There are displaced fractures of the left 3rd-6th ribs. There is a small left apical pneumothorax. The cardiac silhouette, mediastinum, and pulmonary vasculature are within normal limits. There is no consolidation. There is a very small left pleural effusion. There are displaced fractures of the left 3rd-6th ribs. There is a small left apical pneumothorax. There is a very small left pleural effusion.      XR LUMBAR SPINE (2-3 VIEWS)    Result Date: 6/2/2022  EXAMINATION: Lumbar spine x-ray HISTORY: Fall. TECHNIQUE: Frontal, lateral, and coned-down L5-S1 views of the lumbar spine. COMPARISON: None available. FINDINGS: Lumbar lordosis is maintained. There is mild levoscoliosis. There is no acute fracture or traumatic subluxation. Lumbar vertebral body heights are maintained. There is significant narrowing of the intervertebral disc space at all levels. Vacuum disc phenomenon is seen at L1-2, L3-4, and L4-5. Moderate-sized osteophytes are seen with bone bridging at multiple levels. Note is made of partial compression of the T12 vertebral body. No appreciable soft tissue abnormalities. No evidence of acute fracture or subluxation of the lumbar spine. Degenerative changes as described above. Note is made of partial compression of the T12 vertebral body. The age of this is unclear. CT HEAD WO CONTRAST    Result Date: 6/2/2022  EXAM: CT HEAD WO CONTRAST HISTORY:  Has a \"code stroke\" or \"stroke alert\" been called?->No.  TECHNIQUE: Axial images of the brain were performed without the administration of intravenous contrast. Images were obtained axial plane and coronal reformatted images were submitted. Dose reduction technique used: Automated exposure control/Adjustment of the mA and/or kV according to patient size/Use of iterative reconstruction technique. COMPARISON: None. FINDINGS: There is no evidence of acute intracranial hemorrhage, midline shift, or mass effect. No intra or extra-axial fluid collections are observed. There are mild chronic appearing microangiopathic changes within the periventricular white matter. No evidence of acute confluent territorial infarction. Ventricles and basal cisterns are patent and symmetric. There is mild generalized volume loss. Visualized paranasal sinuses and right mastoid air cells are without significant fluid. There is opacification of the left mastoid air cells.  Scalp, soft tissues, and calvarium are within normal limits. 1. No CT evidence of acute intracranial process. 2. There is opacification of the left mastoid air cells. This may represent mastoiditis. No CT evidence of an acute injury. CT CERVICAL SPINE WO CONTRAST    Result Date: 6/2/2022  EXAMINATION: CT CERVICAL SPINE WO CONTRAST HISTORY: Trauma. TECHNIQUE: Noncontrast CT of cervical spine was performed in the axial plane. Coronal and sagittal reformatted images were created and reviewed. Dose reduction technique used: Automated exposure control/Adjustment of the mA and/or kV according to patient size/Use of iterative reconstruction technique. COMPARISON: None. FINDINGS: No evidence of acute fracture. There is mild grade 1 anterolisthesis of C2 on C3. There is normal cervical lordosis. Vertebral body heights are maintained. There is narrowing of the intervertebral disc space at all levels. Small osteophytes are seen throughout the cervical spine. The occipital condyles and visualized skull base is unremarkable. Multilevel degenerative changes are present throughout the cervical spine. No high-grade spinal canal or neural foraminal stenosis is identified. There is no abnormal prevertebral soft tissue edema. No fluid collections are identified within the paraspinal soft tissues. A small pneumothorax is seen in the left apex. There is mild grade 1 anterolisthesis of C2 on C3. The age of this is unclear. Otherwise, there is no CT evidence of an acute fracture or traumatic subluxation. A small pneumothorax is seen in the left apex. This was seen on the left rib x-rays. XR HIP 2-3 VW W PELVIS LEFT    Result Date: 6/2/2022  EXAMINATION: Left Hip HISTORY: Fall. TECHNIQUE: Single frontal view of the pelvis. AP and lateral views of the left hip. COMPARISON: 7/27/2021 FINDINGS: No evidence of acute fracture or dislocation of the pelvis. Bilateral hip joints are intact. The left femoral neck is intact. Soft tissues are unremarkable.      No evidence of acute fracture or dislocation of the left hip. Current Meds:  Current Facility-Administered Medications   Medication Dose Route Frequency    0.9 % sodium chloride infusion   IntraVENous Continuous    0.9 % sodium chloride infusion   IntraVENous PRN    acetaminophen (TYLENOL) tablet 650 mg  650 mg Oral Q6H PRN    Or    acetaminophen (TYLENOL) suppository 650 mg  650 mg Rectal Q6H PRN    ondansetron (ZOFRAN-ODT) disintegrating tablet 4 mg  4 mg Oral Q8H PRN    Or    ondansetron (ZOFRAN) injection 4 mg  4 mg IntraVENous Q6H PRN    polyethylene glycol (GLYCOLAX) packet 17 g  17 g Oral Daily PRN    albuterol sulfate  (90 Base) MCG/ACT inhaler 2 puff  2 puff Inhalation Q4H PRN    amLODIPine (NORVASC) tablet 5 mg  5 mg Oral Daily    morphine injection 4 mg  4 mg IntraVENous Q4H PRN    oxyCODONE (ROXICODONE) immediate release tablet 10 mg  10 mg Oral Q4H PRN    pantoprazole (PROTONIX) 40 mg in sodium chloride (PF) 10 mL injection  40 mg IntraVENous Q12H    Vitamin D (CHOLECALCIFEROL) tablet 5,000 Units  5,000 Units Oral Daily    0.9 % sodium chloride infusion   IntraVENous PRN    insulin lispro (HUMALOG) injection vial 0-8 Units  0-8 Units SubCUTAneous TID WC    insulin lispro (HUMALOG) injection vial 0-4 Units  0-4 Units SubCUTAneous Nightly    glucose chewable tablet 16 g  4 tablet Oral PRN    dextrose bolus 10% 125 mL  125 mL IntraVENous PRN    Or    dextrose bolus 10% 250 mL  250 mL IntraVENous PRN    glucagon injection 1 mg  1 mg IntraMUSCular PRN    dextrose 5 % solution  100 mL/hr IntraVENous PRN       Signed:  Sophia Oseguera MD    Part of this note may have been written by using a voice dictation software. The note has been proof read but may still contain some grammatical/other typographical errors.

## 2022-06-04 NOTE — PROGRESS NOTES
I have discussed with the patient & family member the rationale for blood component transfusion; its benefits in treating or preventing fatigue, organ damage, or death; and its risk which includes mild transfusion reactions, rare risk of blood borne infection, or more serious but rare reactions. I have discussed the alternatives to transfusion, including the risk and consequences of not receiving transfusion. The patient had an opportunity to ask questions and had agreed to proceed with transfusion of blood components.       Sophia Marin MD

## 2022-06-04 NOTE — CONSULTS
Jania Katz  Admission Date: 6/3/2022         Daily Progress Note: 6/4/2022    The patient's chart is reviewed and the patient is discussed with the staff. Background: 80year old female presents to the ER at [facility] complaining of a recent fall after feeling dizzy. She has left sided back pain. CXR shows a small left pneumothorax with multiple rib fractures involving the 3rd, 4th, 5th and 6th ribs. There is also a small left pneumothorax in the left base noted on the thoracic CT. She was found to be anemic in the ER with a hemoglobin of 7. Her stool is heme +. GI has seen and is monitoring for now. She is on a PPI and off the nonsteroids that she was taking at home. Her other medical history includes asthma, diabetes, aortic valve stenosis, GERD, and hypertension. Subjective:     Transferred from [facility] for close monitoring with PTX. No major overnight events.   CXR Pending  Other than pain- no complaints  Current Facility-Administered Medications   Medication Dose Route Frequency    0.9 % sodium chloride infusion   IntraVENous Continuous    0.9 % sodium chloride infusion   IntraVENous PRN    acetaminophen (TYLENOL) tablet 650 mg  650 mg Oral Q6H PRN    Or    acetaminophen (TYLENOL) suppository 650 mg  650 mg Rectal Q6H PRN    ondansetron (ZOFRAN-ODT) disintegrating tablet 4 mg  4 mg Oral Q8H PRN    Or    ondansetron (ZOFRAN) injection 4 mg  4 mg IntraVENous Q6H PRN    polyethylene glycol (GLYCOLAX) packet 17 g  17 g Oral Daily PRN    albuterol sulfate  (90 Base) MCG/ACT inhaler 2 puff  2 puff Inhalation Q4H PRN    amLODIPine (NORVASC) tablet 5 mg  5 mg Oral Daily    morphine injection 4 mg  4 mg IntraVENous Q4H PRN    oxyCODONE (ROXICODONE) immediate release tablet 10 mg  10 mg Oral Q4H PRN    pantoprazole (PROTONIX) 40 mg in sodium chloride (PF) 10 mL injection  40 mg IntraVENous Q12H    Vitamin D (CHOLECALCIFEROL) tablet 5,000 Units  5,000 Units Oral Smoking status: Never Smoker    Smokeless tobacco: Never Used   Substance and Sexual Activity    Alcohol use: Yes    Drug use: No    Sexual activity: Not on file   Other Topics Concern    Not on file   Social History Narrative    Not on file     Social Determinants of Health     Financial Resource Strain:     Difficulty of Paying Living Expenses: Not on file   Food Insecurity:     Worried About Running Out of Food in the Last Year: Not on file    Rosanne of Food in the Last Year: Not on file   Transportation Needs:     Lack of Transportation (Medical): Not on file    Lack of Transportation (Non-Medical): Not on file   Physical Activity:     Days of Exercise per Week: Not on file    Minutes of Exercise per Session: Not on file   Stress:     Feeling of Stress : Not on file   Social Connections:     Frequency of Communication with Friends and Family: Not on file    Frequency of Social Gatherings with Friends and Family: Not on file    Attends [employer] Services: Not on file    Active Member of [address] [place] [address] or Organizations: Not on file    Attends Club or Organization Meetings: Not on file    Marital Status: Not on file   Intimate Partner Violence:     Fear of Current or Ex-Partner: Not on file    Emotionally Abused: Not on file    Physically Abused: Not on file    Sexually Abused: Not on file   Housing Stability:     Unable to Pay for Housing in the Last Year: Not on file    Number of [place] in the Last Year: Not on file    Unstable Housing in the Last Year: Not on file     Family History   Problem Relation Age of Onset    Stroke Mother     Stroke Father      Objective:   Blood pressure (!) 148/60, pulse (!) 103, temperature 98.1 °F (36.7 °C), temperature source Oral, resp. rate 20, height 5' 4\" (1.626 m), weight 104 lb (47.2 kg), SpO2 100 %.      Intake/Output Summary (Last 24 hours) at 6/4/2022 1003  Last data filed at 6/4/2022 0858  Gross per 24 hour   Intake 805 ml   Output --   Net 805 ml     Physical Exam:   Constitution:  the patient is well developed and in no acute distress in pain with movemnt from rib fractures  HEENT:  Sclera clear, pupils equal, oral mucosa moist  Respiratory: CTA  Cardiovascular:  RRR without M,G,R  Gastrointestinal: soft and non-tender; with positive bowel sounds. Musculoskeletal: warm without cyanosis. There is trace lower extremity edema. Skin:  no jaundice or rashes, multiple extremity bruises  Neurologic: no gross neuro deficits     Psychiatric:  alert and oriented x 3    CXR:         LAB:  Recent Labs     06/02/22 2157 06/02/22 2315 06/03/22 0339 06/03/22 2030 06/04/22  0314 06/04/22  0745   WBC 15.9*  --   --   --  11.0  --    HGB 7.0*  --    < > 6.4* 6.3* 8.0*   HCT 22.9*  --   --   --  20.6* 25.3*     --   --   --  266  --    INR  --  1.2  --   --   --   --     < > = values in this interval not displayed. Recent Labs     06/02/22 2157 06/03/22 0339 06/04/22 0314   * 135* 133*   K 4.2 3.9 3.5   CL 95* 99 102   CO2 22 25 23   BUN 29* 24* 19   CREATININE 1.02* 1.14* 0.90   MG  --   --  1.7*   BILITOT 0.4  --   --    AST 21  --   --    ALT 14  --   --    ALKPHOS 63  --   --      No results for input(s): TROPHS, NTPROBNP, CRP, ESR in the last 72 hours. Recent Labs     06/02/22 2157 06/03/22 0339 06/04/22 0314   GLUCOSE 389* 273* 201*      Microbiology:   No results for input(s): CULTURE in the last 72 hours. ECHO: 12/02/21    TRANSTHORACIC ECHOCARDIOGRAM (TTE) COMPLETE (CONTRAST/BUBBLE/3D PRN) 12/03/2021  7:46 AM (Final)    Narrative  This is a summary report. The complete report is available in the patient's medical record. If you cannot access the medical record, please contact the sending organization for a detailed fax or copy. · LV: Estimated LVEF is >70%. Normal cavity size. Mild concentric hypertrophy. Hyperdynamic systolic function. Left ventricular diastolic dysfunction. · AV: Aortic valve leaflet calcification present. Aortic valve mean gradient is 21 mmHg. Aortic valve area is 1.6 cm2. Moderate aortic valve stenosis is present. · TV: Mild tricuspid valve regurgitation is present. Right Ventricular Arterial Pressure (RVSP) is 29 mmHg. · LA: Mildly dilated left atrium. · RA: Mildly dilated right atrium. · MV: Mitral valve non-specific thickening. Mild mitral annular calcification. Mild mitral valve regurgitation is present. · PV: Mild pulmonic valve regurgitation is present. Signed by: Beth Robles MD on 12/3/2021  7:46 AM    Assessment and Plan:  (Medical Decision Making)   Principal Problem:    Acute blood loss anemia (ABLA)  Plan: Seen by GI and on PPI. Plan to monitor hemoglobin and transfuse prn. Off nonsteroidal that she was taking at home. Use SCD hose for DVT prophylaxis  Active Problems:    GI bleed  Plan: as above    Traumatic pneumothorax  Plan: associated with multiple rib fractures. Plan for pain control and observation with serial chest xrays O2 for nitrogen washout. Leukocytosis  Plan: monitor. Do not see need for abx for now    Asthma  Plan: stable. Used prn Albuterol at home    Aortic valve stenosis  Plan: noted    Type 2 diabetes mellitus ([address]  Plan: per hospitalist    Hypertension  Plan: continue home Norvasc and monitor          More than 50% of the time documented was spent in face-to-face contact with the patient and in the care of the patient on the floor/unit where the patient is located.     Lexi Ho MD

## 2022-06-04 NOTE — PROGRESS NOTES
PT chart review and initial evaluation attempt. On 4 liter 02. S/P fall with traumatic small pneumothorax. 7.0 hgb transfusing today. Will return 6/5 for next PT evaluation attempt . Vianca Chaudhari, PT, DPT, OCS.

## 2022-06-05 ENCOUNTER — APPOINTMENT (OUTPATIENT)
Dept: GENERAL RADIOLOGY | Age: 87
DRG: 200 | End: 2022-06-05
Attending: FAMILY MEDICINE
Payer: MEDICARE

## 2022-06-05 LAB
ABO + RH BLD: NORMAL
BLD PROD TYP BPU: NORMAL
BLOOD BANK DISPENSE STATUS: NORMAL
BLOOD GROUP ANTIBODIES SERPL: NORMAL
BPU ID: NORMAL
CROSSMATCH RESULT: NORMAL
FERRITIN SERPL-MCNC: 109 NG/ML (ref 8–388)
GLUCOSE BLD STRIP.AUTO-MCNC: 171 MG/DL (ref 65–100)
GLUCOSE BLD STRIP.AUTO-MCNC: 247 MG/DL (ref 65–100)
GLUCOSE BLD STRIP.AUTO-MCNC: 247 MG/DL (ref 65–100)
GLUCOSE BLD STRIP.AUTO-MCNC: 251 MG/DL (ref 65–100)
IRON SATN MFR SERPL: 4 %
IRON SERPL-MCNC: 10 UG/DL (ref 35–150)
SERVICE CMNT-IMP: ABNORMAL
SPECIMEN EXP DATE BLD: NORMAL
TIBC SERPL-MCNC: 279 UG/DL (ref 250–450)
UNIT DIVISION: 0

## 2022-06-05 PROCEDURE — 1100000000 HC RM PRIVATE

## 2022-06-05 PROCEDURE — 6370000000 HC RX 637 (ALT 250 FOR IP): Performed by: HOSPITALIST

## 2022-06-05 PROCEDURE — APPSS15 APP SPLIT SHARED TIME 0-15 MINUTES: Performed by: NURSE PRACTITIONER

## 2022-06-05 PROCEDURE — 82728 ASSAY OF FERRITIN: CPT

## 2022-06-05 PROCEDURE — 99232 SBSQ HOSP IP/OBS MODERATE 35: CPT | Performed by: INTERNAL MEDICINE

## 2022-06-05 PROCEDURE — 6370000000 HC RX 637 (ALT 250 FOR IP): Performed by: INTERNAL MEDICINE

## 2022-06-05 PROCEDURE — 71045 X-RAY EXAM CHEST 1 VIEW: CPT

## 2022-06-05 PROCEDURE — 97166 OT EVAL MOD COMPLEX 45 MIN: CPT

## 2022-06-05 PROCEDURE — 83051 HEMOGLOBIN PLASMA: CPT

## 2022-06-05 PROCEDURE — 97530 THERAPEUTIC ACTIVITIES: CPT

## 2022-06-05 PROCEDURE — 97535 SELF CARE MNGMENT TRAINING: CPT

## 2022-06-05 PROCEDURE — 6360000002 HC RX W HCPCS: Performed by: INTERNAL MEDICINE

## 2022-06-05 PROCEDURE — 83550 IRON BINDING TEST: CPT

## 2022-06-05 PROCEDURE — 36415 COLL VENOUS BLD VENIPUNCTURE: CPT

## 2022-06-05 PROCEDURE — 97161 PT EVAL LOW COMPLEX 20 MIN: CPT

## 2022-06-05 PROCEDURE — 82962 GLUCOSE BLOOD TEST: CPT

## 2022-06-05 RX ORDER — METOPROLOL SUCCINATE 50 MG/1
50 TABLET, EXTENDED RELEASE ORAL DAILY
Status: DISCONTINUED | OUTPATIENT
Start: 2022-06-06 | End: 2022-06-11

## 2022-06-05 RX ORDER — FERROUS SULFATE 325(65) MG
325 TABLET ORAL
Status: DISCONTINUED | OUTPATIENT
Start: 2022-06-06 | End: 2022-06-13 | Stop reason: HOSPADM

## 2022-06-05 RX ORDER — INSULIN GLARGINE 100 [IU]/ML
5 INJECTION, SOLUTION SUBCUTANEOUS NIGHTLY
Status: DISCONTINUED | OUTPATIENT
Start: 2022-06-05 | End: 2022-06-11

## 2022-06-05 RX ORDER — OXYCODONE HYDROCHLORIDE 5 MG/1
5 TABLET ORAL EVERY 4 HOURS PRN
Status: DISCONTINUED | OUTPATIENT
Start: 2022-06-05 | End: 2022-06-06

## 2022-06-05 RX ORDER — OXYCODONE HYDROCHLORIDE 5 MG/1
10 TABLET ORAL EVERY 4 HOURS PRN
Status: DISCONTINUED | OUTPATIENT
Start: 2022-06-05 | End: 2022-06-06

## 2022-06-05 RX ADMIN — AMLODIPINE BESYLATE 5 MG: 5 TABLET ORAL at 08:34

## 2022-06-05 RX ADMIN — INSULIN GLARGINE 5 UNITS: 100 INJECTION, SOLUTION SUBCUTANEOUS at 21:33

## 2022-06-05 RX ADMIN — INSULIN LISPRO 2 UNITS: 100 INJECTION, SOLUTION INTRAVENOUS; SUBCUTANEOUS at 17:26

## 2022-06-05 RX ADMIN — METOPROLOL SUCCINATE 25 MG: 50 TABLET, EXTENDED RELEASE ORAL at 08:33

## 2022-06-05 RX ADMIN — ONDANSETRON 4 MG: 2 INJECTION INTRAMUSCULAR; INTRAVENOUS at 15:33

## 2022-06-05 RX ADMIN — INSULIN LISPRO 4 UNITS: 100 INJECTION, SOLUTION INTRAVENOUS; SUBCUTANEOUS at 12:22

## 2022-06-05 RX ADMIN — OXYCODONE 10 MG: 5 TABLET ORAL at 15:33

## 2022-06-05 RX ADMIN — ONDANSETRON 4 MG: 2 INJECTION INTRAMUSCULAR; INTRAVENOUS at 08:34

## 2022-06-05 RX ADMIN — MORPHINE SULFATE 4 MG: 2 INJECTION, SOLUTION INTRAMUSCULAR; INTRAVENOUS at 08:33

## 2022-06-05 RX ADMIN — PANTOPRAZOLE SODIUM 40 MG: 40 TABLET, DELAYED RELEASE ORAL at 06:05

## 2022-06-05 RX ADMIN — VITAMIN D, TAB 1000IU (100/BT) 5000 UNITS: 25 TAB at 08:33

## 2022-06-05 RX ADMIN — INSULIN LISPRO 2 UNITS: 100 INJECTION, SOLUTION INTRAVENOUS; SUBCUTANEOUS at 09:02

## 2022-06-05 ASSESSMENT — PAIN SCALES - GENERAL
PAINLEVEL_OUTOF10: 7
PAINLEVEL_OUTOF10: 0
PAINLEVEL_OUTOF10: 2
PAINLEVEL_OUTOF10: 3
PAINLEVEL_OUTOF10: 8

## 2022-06-05 ASSESSMENT — PAIN DESCRIPTION - ORIENTATION: ORIENTATION: LEFT

## 2022-06-05 ASSESSMENT — PAIN DESCRIPTION - LOCATION: LOCATION: RIB CAGE

## 2022-06-05 ASSESSMENT — PAIN DESCRIPTION - DESCRIPTORS: DESCRIPTORS: SORE

## 2022-06-05 NOTE — PROGRESS NOTES
Hospitalist Progress Note   Admit Date:  6/3/2022  4:48 PM   Name:  Paulette Ruiz   Age:  80 y.o. Sex:  female  :  1933   MRN:  [id number]   Room:  803/    Presenting Complaint: No chief complaint on file. Reason(s) for Admission: Traumatic pneumothorax, initial encounter [S27. [place] Course & Interval History:   80 y.o. female with medical history of HTN, DM2 who presented to the [facility] ED on 22 after a fall onto her back. She reported becoming dizzy in her bathroom and falling. She denies any LOC or head injury. She had L sided back pain immediately after and since then. Upon ER evaluation, CXR shows a \"small left apical pneumothorax\" and \"very small left pleural effusion. \" with multiple rib fractures. Incidentally, her Hgb was found to be 7.0. Hemoccult was obtained which was positive. Patient was admitted to [facility] for hospitalization. Her Hgb remained stable at 7.0. Repeat CXR showed slightly larger pneumothorax with tracheal shift and rib fractures. Pulmonology reviewed the films and requested transfer to  for possible chest tube but decided against it due to the amount of pain it would cause with rib fractures. Subjective/24hr Events (22): Pt still has a moderate pain at rib fracture sites, constant. Worse with movement. No fevers, CP, SOB. Denies bleeding      Assessment & Plan:       Pneumothorax, traumatic  --recheck CXR in AM; CXR today reviewed and stable   -discussed with Pulm today  -PT notes recommending rehab  -cont PRN pain meds      ABLA  -probably from fall-related bruising. GI said no GIB, no endoscopy  -cont PO iron; iron studies showing deficiency  -daily hb; improved today      Type 2 diabetes mellitus ([address]  -uncontrolled  -start lantus 5u  -ADA diet  -ISS      Hypertension  -uncontrolled  -increase toprol tomorrow. May need norvasc increased also      Discharge Planning:    -needs SNF.    She cannot get PPD but is getting daily CXR and no evidence of active TB    Diet:  ADULT DIET; Regular; 4 carb choices (60 gm/meal)  DVT PPx: SCD  Code status: Full Code    Hospital Problems:  Principal Problem:    Pneumothorax, traumatic  Active Problems:    GI bleed    Traumatic pneumothorax    Acute blood loss anemia (ABLA)    Aortic valve stenosis    Type 2 diabetes mellitus ([facility] [address]    Hypertension  Resolved Problems:    * No resolved hospital problems. *      Objective:     Patient Vitals for the past 24 hrs:   Temp Pulse Resp BP SpO2   06/05/22 1115 98.1 °F (36.7 °C) 85 20 (!) 147/66 99 %   06/05/22 1055 -- -- -- -- 99 %   06/05/22 0715 98.4 °F (36.9 °C) 81 20 (!) 154/59 100 %   06/05/22 0415 97.9 °F (36.6 °C) 83 20 (!) 146/59 99 %   06/04/22 2337 97.7 °F (36.5 °C) 86 20 (!) 171/61 100 %   06/04/22 1921 98.1 °F (36.7 °C) 92 20 (!) 140/60 98 %   06/04/22 1909 -- -- 20 -- --   06/04/22 1543 (!) 96.3 °F (35.7 °C) 97 20 (!) 158/59 100 %       Oxygen Therapy  SpO2: 99 %  O2 Device: Nasal cannula  Skin Assessment: Clean, dry, & intact  O2 Flow Rate (L/min): 2 L/min    Estimated body mass index is 17.85 kg/m² as calculated from the following:    Height as of this encounter: 5' 4\" (1.626 m). Weight as of this encounter: 104 lb (47.2 kg). Intake/Output Summary (Last 24 hours) at 6/5/2022 1132  Last data filed at 6/4/2022 1812  Gross per 24 hour   Intake 120 ml   Output 200 ml   Net -80 ml         Physical Exam:     Blood pressure (!) 147/66, pulse 85, temperature 98.1 °F (36.7 °C), temperature source Oral, resp. rate 20, height 5' 4\" (1.626 m), weight 104 lb (47.2 kg), SpO2 99 %. General:    Well nourished. Head:  Normocephalic, atraumatic  Eyes:  Sclerae appear normal.  Pupils equally round. ENT:  Nares appear normal, no drainage. Moist oral mucosa  Neck:  No restricted ROM. Trachea midline   CV:   RRR. No jugular venous distension. Lungs:   CTAB. Respirations even, unlabored  Abdomen:    nondistended. Extremities: No cyanosis or clubbing.   No edema  Skin:     No rashes and normal coloration. Warm and dry. Neuro:  CN II-XII grossly intact. Sensation intact. A&Ox3  Psych:  Normal mood and affect.       I have reviewed ordered lab tests and independently visualized imaging below:    Recent Labs:  Recent Results (from the past 48 hour(s))   Hemoglobin    Collection Time: 06/03/22 12:04 PM   Result Value Ref Range    Hemoglobin 7.0 (L) 11.7 - 15.4 g/dL   Hemoglobin    Collection Time: 06/03/22  8:30 PM   Result Value Ref Range    Hemoglobin 6.4 (LL) 11.7 - 15.4 g/dL   POCT Glucose    Collection Time: 06/03/22  9:02 PM   Result Value Ref Range    POC Glucose 305 (H) 65 - 100 mg/dL    Performed by: Aden    PREPARE RBC (CROSSMATCH), 1 Units    Collection Time: 06/03/22  9:45 PM   Result Value Ref Range    History Check Historical check performed    POCT Glucose    Collection Time: 06/03/22 10:48 PM   Result Value Ref Range    POC Glucose 275 (H) 65 - 100 mg/dL    Performed by: Aden    TYPE AND SCREEN    Collection Time: 06/03/22 11:01 PM   Result Value Ref Range    Crossmatch expiration date 06/06/2022,6766     ABO/Rh O POSITIVE     Antibody Screen NEG     Unit Number [id number]     Product Code Blood Bank RC LR     Unit Divison 00     Dispense Status Blood Bank TRANSFUSED     Crossmatch Result Compatible    Basic Metabolic Panel w/ Reflex to MG    Collection Time: 06/04/22  3:14 AM   Result Value Ref Range    Sodium 133 (L) 136 - 145 mmol/L    Potassium 3.5 3.5 - 5.1 mmol/L    Chloride 102 98 - 107 mmol/L    CO2 23 21 - 32 mmol/L    Anion Gap 8 7 - 16 mmol/L    Glucose 201 (H) 65 - 100 mg/dL    BUN 19 8 - 23 MG/DL    CREATININE 0.90 0.6 - 1.0 MG/DL    GFR African American >60 >60 ml/min/1.73m2    GFR Non- >60 >60 ml/min/1.73m2    Calcium 8.0 (L) 8.3 - 10.4 MG/DL   CBC with Auto Differential    Collection Time: 06/04/22  3:14 AM   Result Value Ref Range    WBC 11.0 4.3 - 11.1 K/uL    RBC 2.58 (L) 4.05 - 5.2 M/uL Hemoglobin 6.3 (LL) 11.7 - 15.4 g/dL    Hematocrit 20.6 (L) 35.8 - 46.3 %    MCV 79.8 79.6 - 97.8 FL    MCH 24.4 (L) 26.1 - 32.9 PG    MCHC 30.6 (L) 31.4 - 35.0 g/dL    RDW 15.0 (H) 11.9 - 14.6 %    Platelets 611 994 - 520 K/uL    MPV 10.4 9.4 - 12.3 FL    nRBC 0.00 0.0 - 0.2 K/uL    Differential Type AUTOMATED      Seg Neutrophils 83 (H) 43 - 78 %    Lymphocytes 11 (L) 13 - 44 %    Monocytes 4 4.0 - 12.0 %    Eosinophils % 1 0.5 - 7.8 %    Basophils 0 0.0 - 2.0 %    Immature Granulocytes 1 0.0 - 5.0 %    Segs Absolute 9.2 (H) 1.7 - 8.2 K/UL    Absolute Lymph # 1.2 0.5 - 4.6 K/UL    Absolute Mono # 0.5 0.1 - 1.3 K/UL    Absolute Eos # 0.1 0.0 - 0.8 K/UL    Basophils Absolute 0.0 0.0 - 0.2 K/UL    Absolute Immature Granulocyte 0.1 0.0 - 0.5 K/UL   Magnesium    Collection Time: 06/04/22  3:14 AM   Result Value Ref Range    Magnesium 1.7 (L) 1.8 - 2.4 mg/dL   POCT Glucose    Collection Time: 06/04/22  7:39 AM   Result Value Ref Range    POC Glucose 276 (H) 65 - 100 mg/dL    Performed by: [employer]    Hemoglobin and Hematocrit    Collection Time: 06/04/22  7:45 AM   Result Value Ref Range    Hemoglobin 8.0 (L) 11.7 - 15.4 g/dL    Hematocrit 25.3 (L) 35.8 - 46.3 %   POCT Glucose    Collection Time: 06/04/22 11:17 AM   Result Value Ref Range    POC Glucose 179 (H) 65 - 100 mg/dL    Performed by: [employer]    POCT Glucose    Collection Time: 06/04/22  4:12 PM   Result Value Ref Range    POC Glucose 152 (H) 65 - 100 mg/dL    Performed by: [facility]    POCT Glucose    Collection Time: 06/04/22  8:41 PM   Result Value Ref Range    POC Glucose 255 (H) 65 - 100 mg/dL    Performed by: Stephany    Transferrin Saturation    Collection Time: 06/05/22  2:52 AM   Result Value Ref Range    Iron 10 (L) 35 - 150 ug/dL    TIBC 279 250 - 450 ug/dL    TRANSFERRIN SATURATION 4 (L) >20 %   Ferritin    Collection Time: 06/05/22  2:52 AM   Result Value Ref Range    Ferritin 109 8 - 388 NG/ML   POCT Glucose    Collection Time: 06/05/22  8:00 AM   Result Value Ref Range    POC Glucose 247 (H) 65 - 100 mg/dL    Performed by: Dominique        Other Studies:  XR CHEST (2 VW)    Result Date: 6/3/2022  Chest X-ray COMPARISON: Chest x-ray 11/19/2011. INDICATION: Follow-up pneumothorax. AP and lateral views of the chest were obtained. Lungs are hypoaerated but otherwise clear. Subcutaneous emphysema noted along the left lateral chest wall. Small left apical pneumothorax is present with multiple new rib fracture deformities involving the lateral left third, fourth, fifth and sixth ribs. No evidence of right pneumothorax. The heart size is normal in size. No pleural effusions. XR RIBS LEFT INCLUDE CHEST (MIN 3 VIEWS)    Result Date: 6/2/2022  EXAM: Left ribs HISTORY: fall. TECHNIQUE: AP and oblique views of the left ribs were submitted. A single frontal view of the chest was submitted. COMPARISON: Multiple prior studies with most recent on FINDINGS: There are displaced fractures of the left 3rd-6th ribs. There is a small left apical pneumothorax. The cardiac silhouette, mediastinum, and pulmonary vasculature are within normal limits. There is no consolidation. There is a very small left pleural effusion. There are displaced fractures of the left 3rd-6th ribs. There is a small left apical pneumothorax. There is a very small left pleural effusion. XR LUMBAR SPINE (2-3 VIEWS)    Result Date: 6/2/2022  EXAMINATION: Lumbar spine x-ray HISTORY: Fall. TECHNIQUE: Frontal, lateral, and coned-down L5-S1 views of the lumbar spine. COMPARISON: None available. FINDINGS: Lumbar lordosis is maintained. There is mild levoscoliosis. There is no acute fracture or traumatic subluxation. Lumbar vertebral body heights are maintained. There is significant narrowing of the intervertebral disc space at all levels. Vacuum disc phenomenon is seen at L1-2, L3-4, and L4-5.  Moderate-sized osteophytes are seen with bone bridging at multiple levels. Note is made of partial compression of the T12 vertebral body. No appreciable soft tissue abnormalities. No evidence of acute fracture or subluxation of the lumbar spine. Degenerative changes as described above. Note is made of partial compression of the T12 vertebral body. The age of this is unclear. CT HEAD WO CONTRAST    Result Date: 6/2/2022  EXAM: CT HEAD WO CONTRAST HISTORY:  Has a \"code stroke\" or \"stroke alert\" been called?->No.  TECHNIQUE: Axial images of the brain were performed without the administration of intravenous contrast. Images were obtained axial plane and coronal reformatted images were submitted. Dose reduction technique used: Automated exposure control/Adjustment of the mA and/or kV according to patient size/Use of iterative reconstruction technique. COMPARISON: None. FINDINGS: There is no evidence of acute intracranial hemorrhage, midline shift, or mass effect. No intra or extra-axial fluid collections are observed. There are mild chronic appearing microangiopathic changes within the periventricular white matter. No evidence of acute confluent territorial infarction. Ventricles and basal cisterns are patent and symmetric. There is mild generalized volume loss. Visualized paranasal sinuses and right mastoid air cells are without significant fluid. There is opacification of the left mastoid air cells. Scalp, soft tissues, and calvarium are within normal limits. 1. No CT evidence of acute intracranial process. 2. There is opacification of the left mastoid air cells. This may represent mastoiditis. No CT evidence of an acute injury. CT CERVICAL SPINE WO CONTRAST    Result Date: 6/2/2022  EXAMINATION: CT CERVICAL SPINE WO CONTRAST HISTORY: Trauma. TECHNIQUE: Noncontrast CT of cervical spine was performed in the axial plane. Coronal and sagittal reformatted images were created and reviewed. Dose reduction technique used:  Automated exposure control/Adjustment of the mA and/or kV according to patient size/Use of iterative reconstruction technique. COMPARISON: None. FINDINGS: No evidence of acute fracture. There is mild grade 1 anterolisthesis of C2 on C3. There is normal cervical lordosis. Vertebral body heights are maintained. There is narrowing of the intervertebral disc space at all levels. Small osteophytes are seen throughout the cervical spine. The occipital condyles and visualized skull base is unremarkable. Multilevel degenerative changes are present throughout the cervical spine. No high-grade spinal canal or neural foraminal stenosis is identified. There is no abnormal prevertebral soft tissue edema. No fluid collections are identified within the paraspinal soft tissues. A small pneumothorax is seen in the left apex. There is mild grade 1 anterolisthesis of C2 on C3. The age of this is unclear. Otherwise, there is no CT evidence of an acute fracture or traumatic subluxation. A small pneumothorax is seen in the left apex. This was seen on the left rib x-rays. XR HIP 2-3 VW W PELVIS LEFT    Result Date: 6/2/2022  EXAMINATION: Left Hip HISTORY: Fall. TECHNIQUE: Single frontal view of the pelvis. AP and lateral views of the left hip. COMPARISON: 7/27/2021 FINDINGS: No evidence of acute fracture or dislocation of the pelvis. Bilateral hip joints are intact. The left femoral neck is intact. Soft tissues are unremarkable. No evidence of acute fracture or dislocation of the left hip.        Current Meds:  Current Facility-Administered Medications   Medication Dose Route Frequency    [START ON 6/6/2022] ferrous sulfate (IRON 325) tablet 325 mg  325 mg Oral Daily with breakfast    oxyCODONE (ROXICODONE) immediate release tablet 5 mg  5 mg Oral Q4H PRN    oxyCODONE (ROXICODONE) immediate release tablet 10 mg  10 mg Oral Q4H PRN    insulin glargine (LANTUS) injection vial 5 Units  5 Units SubCUTAneous Nightly    metoprolol succinate (TOPROL XL) extended release tablet 25 mg  25 mg Oral Daily    pantoprazole (PROTONIX) tablet 40 mg  40 mg Oral QAM AC    0.9 % sodium chloride infusion   IntraVENous PRN    acetaminophen (TYLENOL) tablet 650 mg  650 mg Oral Q6H PRN    Or    acetaminophen (TYLENOL) suppository 650 mg  650 mg Rectal Q6H PRN    ondansetron (ZOFRAN-ODT) disintegrating tablet 4 mg  4 mg Oral Q8H PRN    Or    ondansetron (ZOFRAN) injection 4 mg  4 mg IntraVENous Q6H PRN    polyethylene glycol (GLYCOLAX) packet 17 g  17 g Oral Daily PRN    albuterol sulfate  (90 Base) MCG/ACT inhaler 2 puff  2 puff Inhalation Q4H PRN    amLODIPine (NORVASC) tablet 5 mg  5 mg Oral Daily    Vitamin D (CHOLECALCIFEROL) tablet 5,000 Units  5,000 Units Oral Daily    insulin lispro (HUMALOG) injection vial 0-8 Units  0-8 Units SubCUTAneous TID WC    insulin lispro (HUMALOG) injection vial 0-4 Units  0-4 Units SubCUTAneous Nightly    glucose chewable tablet 16 g  4 tablet Oral PRN    dextrose bolus 10% 125 mL  125 mL IntraVENous PRN    Or    dextrose bolus 10% 250 mL  250 mL IntraVENous PRN    glucagon injection 1 mg  1 mg IntraMUSCular PRN    dextrose 5 % solution  100 mL/hr IntraVENous PRN       Signed:  Terry Warner MD    Part of this note may have been written by using a voice dictation software. The note has been proof read but may still contain some grammatical/other typographical errors.

## 2022-06-05 NOTE — PROGRESS NOTES
OCCUPATIONAL THERAPY Initial Assessment and Daily Note       OT Visit Days: 1  Acknowledge Orders  Time  OT Charge Capture  Rehab Caseload Tracker      Iggy Scherer is a 80 y.o. female   PRIMARY DIAGNOSIS: Pneumothorax, traumatic  Traumatic pneumothorax, initial encounter [S27. 0XXA]       Reason for Referral: Generalized Muscle Weakness (M62.81)  Difficulty in walking, Not elsewhere classified (R26.2)  Other abnormalities of gait and mobility (R26.89)  History of falling (Z91.81)  Inpatient: Payor: Taryn Meneses / Plan: Tariq Becker PPO / Product Type: Medicare /     ASSESSMENT:     REHAB RECOMMENDATIONS:   Recommendation to date pending progress:  Setting:   Short-term Rehab    Equipment:     3 in 1 Bedside Commode   Rolling Walker     ASSESSMENT:  Ms. Ilia Chappell is an 79 y/o female presents from home after a fall with multiple rib fractures on her left side as well as a traumatic pnuemothorax. Today pt presents with decreased activity tolerance, balance, strength and mobility impacting ADLs. Pt is currently on 2L O2 NC and does not wear home O2. Pt overall min A x2 HHA to stand (pt reported pain in ribs when WB on RW) and mod A x2 HHA for ambulation. Pt able to complete grooming ADLs sitting at sink with set up and ambulate into bathroom to complete toileting with min A x2 for dynamic balance. Pt completed other UE/LE dressing ADLs in grid below. Pt is limited by pain and fatigue in ribs and is requiring increased assistance for ADLs at this time. Pt O2 sats >98% throughout. Pt is currently functioning below baseline and would benefit from skilled OT services to address OT goals and plan of care. Rec STR at d/c.       [address] [facility] [address] [address] AM-PAC 6 Clicks Daily Activity Inpatient Short Form:    AM-PAC Daily Activity Inpatient   How much help for putting on and taking off regular lower body clothing?: A Lot  How much help for Bathing?: A Lot  How much help for Toileting?: A Lot  How much help for putting on and taking off regular upper body clothing?: A Little  How much help for taking care of personal grooming?: A Little  How much help for eating meals?: None  AM-PAC Inpatient Daily Activity Raw Score: 16  AM-PAC Inpatient ADL T-Scale Score : 35.96  ADL Inpatient CMS 0-100% Score: 53.32  ADL Inpatient CMS G-Code Modifier : CK           SUBJECTIVE:     Ms. Ephriam Osler states, \"I'm causing trouble aren't I\"     Social/Functional Lives With: Daughter  Type of Home: House  Home Layout: Two level  Bathroom Shower/Tub: Walk-in shower  Bathroom Toilet: Handicap height  Bathroom Equipment: Shower chair  Home Equipment: Rollator  Has the patient had two or more falls in the past year or any fall with injury in the past year?: No  Receives Help From: Family  ADL Assistance: Independent  Active : No    OBJECTIVE:     Severa Broom / Tyler Cain / Artemio Valdezr: None    RESTRICTIONS/PRECAUTIONS:  Restrictions/Precautions: Fall Risk    PAIN: VITALS / O2:   Pre Treatment:   Pain Assessment: 0-10  Pain Level: 8  Pain Location: Rib cage  Pain Orientation: Left  Pain Descriptors: Sore  Non-Pharmaceutical Pain Intervention(s): Repositioned      Post Treatment: no c/o pain, resting comfortably in bedside chair       Vitals          Oxygen  O2 Device: Nasal cannula  O2 Flow Rate (L/min): 2 L/min  SpO2: 99 %         GROSS EVALUATION: INTACT IMPAIRED   (See Comments)   UE AROM [] []limited by pain in ribs   UE PROM [] []limited by pain in ribs   Strength []    Generally decreased   Posture / Balance [] Posture: Fair  Sitting - Static: Good  Sitting - Dynamic: Fair,+  Standing - Static: Fair,-  Standing - Dynamic: Poor   Sensation [x]     Coordination [] Gross motor impairments      Tone [x]       Edema [x] NA    Activity Tolerance [] Patient limited by fatigue,Patient limited by pain     Hand Dominance R [] L []      COGNITION/  PERCEPTION: INTACT IMPAIRED   (See Comments)   Orientation [x]     Vision [x]     Hearing [x]     Cognition  [x] Perception [] Decreased safety awareness     MOBILITY: I Mod I S SBA CGA Min Mod Max Total  NT x2 Comments: pt received sitting in chair   Bed Mobility    Rolling [] [] [] [] [] [] [] [] [] [x] []    Supine to Sit [] [] [] [] [] [] [] [] [] [x] []    Scooting [] [] [] [] [] [] [] [] [] [x] []    Sit to Supine [] [] [] [] [] [] [] [] [] [x] []    Transfers    Sit to Stand [] [] [] [] [] [x] [] [] [] [] [x] HHA   Bed to Chair [] [] [] [] [] [] [x] [] [] [] [x] HHA   Stand to Sit [] [] [] [] [] [x] [] [] [] [] [x] HHA   Tub/Shower [] [] [] [] [] [] [] [] [] [x] []     Toilet [] [] [] [] [] [] [x] [] [] [] [x]  HHA    [] [] [] [] [] [] [] [] [] [x] []    I=Independent, Mod I=Modified Independent, S=Supervision/Setup, SBA=Standby Assistance, CGA=Contact Guard Assistance, Min=Minimal Assistance, Mod=Moderate Assistance, Max=Maximal Assistance, Total=Total Assistance, NT=Not Tested    ACTIVITIES OF DAILY LIVING: I Mod I S SBA CGA Min Mod Max Total NT Comments   BASIC ADLs:              Bathing/ Showering [] [] [] [] [] [] [] [] [] [x]    Toileting [] [] [] [] [] [x] [] [] [] [] x2 HHA   Upper Body Dressing [] [] [] [] [] [x] [] [] [] [] Donning/doffing gown seated in chair   Lower Body Dressing [] [] [] [] [] [] [] [x] [] [] Donning/doffing pants and brief sitting/standing from chair   Feeding [] [] [] [] [] [] [] [] [] [x]    Grooming [] [] [x] [] [] [] [] [] [] [] Set up brushing teeth and washing face sitting in chair   Personal Device Care [] [] [] [] [] [] [] [] [] [x]    Functional Mobility [] [] [] [] [] [x] [x] [] [] [] x2 HHA   I=Independent, Mod I=Modified Independent, S=Supervision/Setup, SBA=Standby Assistance, CGA=Contact Guard Assistance, Min=Minimal Assistance, Mod=Moderate Assistance, Max=Maximal Assistance, Total=Total Assistance, NT=Not Tested    PLAN:     FREQUENCY/DURATION   OT Plan of Care: 3 times/week for duration of hospital stay or until stated goals are met, whichever comes first.    ACUTE light within reach, Chair, Needs within reach and RN notified    INTERDISCIPLINARY COLLABORATION:  RN/ PCT, PT/ PTA and OT/ ABBOTT    EDUCATION:       TOTAL TREATMENT DURATION AND TIME:  Time In: [facility] [address]  Time Out: [address]  Minutes: [address], OT

## 2022-06-05 NOTE — PROGRESS NOTES
PHYSICAL THERAPY Daily Note and AM  (Link to Caseload Tracking: PT Visit Days : 1  Time In/Out PT Charge Capture  Rehab Caseload Tracker  Orders      Alvin Enamorado is a 80 y.o. female   PRIMARY DIAGNOSIS: Pneumothorax, traumatic  Traumatic pneumothorax, initial encounter [S27. 0XXA]       Inpatient: Payor: Mani Rivero / Plan: Jeffrey Andres PPO / Product Type: Medicare /     ASSESSMENT:     REHAB RECOMMENDATIONS:   Recommendation to date pending progress:  Setting:   Short-term Rehab    Equipment:     To Be Determined     ASSESSMENT:  Ms. Mckinnon Orf in bedside chair, requiring assistance to return to bed. She stands and ambulates 4-5' w/ mod Ax2 via HHAx2, cues for step length. She initially tries to sit before she is at the bed, but corrects herself with heavy verbal cues from this therapist. Once on EOB, she requires min Ax2 for sit to supine. Slow progress this pm as pt's mobility remains limited due to L rib fractures. Will continue to follow.       SUBJECTIVE:   Ms. Maria Esther Munroe states, \"I can't get there on my own\"     Social/Functional Lives With:  (has to get up 16 stairs to get into 2nd story apt)  Type of Home: House  Home Layout: Two level  Bathroom Shower/Tub: Walk-in shower  Bathroom Toilet: Handicap height  Bathroom Equipment: Shower chair  Home Equipment: Rollator  Has the patient had two or more falls in the past year or any fall with injury in the past year?: No  Receives Help From: Family  ADL Assistance: Independent  Active : No  OBJECTIVE:     PAIN: VITALS / O2: Salli Capuchin / Miguel Dyke / DRAINS:   Pre Treatment:   Pain Assessment: 0-10  Pain Level: 3      Post Treatment: 3/10 Vitals        Oxygen    IV    RESTRICTIONS/PRECAUTIONS:        MOBILITY: I Mod I S SBA CGA Min Mod Max Total  NT x2 Comments:   Bed Mobility    Rolling [] [] [] [] [] [] [] [] [] [x] []    Supine to Sit [] [] [] [] [] [] [] [] [] [x] []    Scooting [] [] [] [] [] [] [] [] [] [x] []    Sit to Supine [] [] [] [] [] [x] [] [] [] [] [x]    Transfers    Sit to Stand [] [] [] [] [] [] [x] [] [] [] [x]    Bed to Chair [] [] [] [] [] [] [x] [] [] [] [x]    Stand to Sit [] [] [] [] [] [] [x] [] [] [] [x]     [] [] [] [] [] [] [] [] [] [] []    I=Independent, Mod I=Modified Independent, S=Supervision, SBA=Standby Assistance, CGA=Contact Guard Assistance,   Min=Minimal Assistance, Mod=Moderate Assistance, Max=Maximal Assistance, Total=Total Assistance, NT=Not Tested    BALANCE: Good Fair+ Fair Fair- Poor NT Comments   Sitting Static [x] [] [] [] [] []    Sitting Dynamic [x] [] [] [] [] []              Standing Static [] [] [x] [] [] []    Standing Dynamic [] [] [] [x] [] []      GAIT: I Mod I S SBA CGA Min Mod Max Total  NT x2 Comments:   Level of Assistance [] [] [] [] [] [] [x] [] [] [] [x]    Distance  (x15, 2 x 6) feet    DME HHax2    Gait Quality very slow and shuffled    Weightbearing Status      Stairs      I=Independent, Mod I=Modified Independent, S=Supervision, SBA=Standby Assistance, CGA=Contact Guard Assistance,   Min=Minimal Assistance, Mod=Moderate Assistance, Max=Maximal Assistance, Total=Total Assistance, NT=Not Tested    PLAN:   ACUTE PHYSICAL THERAPY GOALS:   (Developed with and agreed upon by patient and/or caregiver. )  LTG:  (1.)Ms. Ole Kehr will move from supine to sit and sit to supine , scoot up and down and roll side to side in bed with MINIMAL ASSIST within 7 treatment day(s). (2.)Ms. Ole Kehr will transfer from bed to chair and chair to bed with MINIMAL ASSIST using the least restrictive device within 7 treatment day(s). (3.)Ms. Ole Kehr will ambulate with MINIMAL ASSIST for 50 feet with the least restrictive device within 7 treatment day(s). (4.)Ms. Ole Kehr will tolerate at least 23 min of dynamic standing activity to assist standing ADLs with the least restrictive device within 7 treatment days. (5.)Ms. Ole Kehr will climb at least 16 steps with MODERATE ASSIST with the least restrictive device within 7 treatment days. FREQUENCY AND DURATION: 3 times/week for duration of hospital stay or until stated goals are met, whichever comes first.    TREATMENT:   TREATMENT:   Therapeutic Activity (8 Minutes): Therapeutic activity included Sit to Supine, Transfer Training, Ambulation on level ground, Sitting balance  and Standing balance to improve functional Activity tolerance and Mobility.     TREATMENT GRID:  N/A    AFTER TREATMENT PRECAUTIONS: Alarm Activated, Bed, Bed/Chair Locked, Call light within reach and RN at bedside    INTERDISCIPLINARY COLLABORATION:  RN/ PCT and PT/ PTA    EDUCATION: Education Given To: Patient    TIME IN/OUT:  Time In: 1153  Time Out: [facility]  Minutes: [facility] Ramiro Watson PT

## 2022-06-05 NOTE — PROGRESS NOTES
PHYSICAL THERAPY Initial Assessment  (Link to Caseload Tracking: PT Visit Days : 1  Acknowledge Orders  Time In/Out  PT Charge Capture  Rehab Caseload Tracker    Saint Cipro is a 80 y.o. female   PRIMARY DIAGNOSIS: Pneumothorax, traumatic  Traumatic pneumothorax, initial encounter [S27. 0XXA]       Reason for Referral: Generalized Muscle Weakness (M62.81)  Other lack of cordination (R27.8)  Difficulty in walking, Not elsewhere classified (R26.2)  Other abnormalities of gait and mobility (R26.89)  History of falling (Z91.81)  Inpatient: Payor: Norma Christianson / Plan: [address] PPO / Product Type: Medicare /     ASSESSMENT:     REHAB RECOMMENDATIONS:   Recommendation to date pending progress:  Setting:   Short-term Rehab    Equipment:     3 in 1 Bedside Commode   Rolling Walker     ASSESSMENT:  Ms. Celestino Rondon presents in bedside chair, A&Ox4, w/ left sided rib fractues. All mobility today requires significant extra time due to slowed movement and pt's reliance on HHA. Upon entering, Pnt is agreeable to PT treatment. she reports 3/10 pain in her left ribs at rest. Pnt performed  Sit > stand with min Ax using RW at first, but is unable to push the RW. Gait is a very slow 15' w/ mod Ax2 via HHAx2, cues for extension and step length. Gait is noted to be very slow and shuffled. Stand > sit with min Ax2, followed by standing at sink w/ posterior lean for ADLs, requiring safety cues from both therapists. Pt reports need to go to the bathroom, and required another 2 x 6' mod Ax2 assistance for toilet transfer, followed by standing brief change. She then sits back into recliner w/ min Ax2, then positioning for comfort. At end of session pt up in bedside chair with all needs within reach, alarm activated for safety, RN notified. Overall, she presents as functioning below her baseline, with deficits in mobility including transfers, gait, balance, and activity tolerance.  Pt will continue to benefit from skilled therapy services to address stated deficits to promote return to highest level of function, independence, and safety. Will continue to follow.          [address] [facility] [address] [address] AM-PAC 6 Clicks Basic Mobility Inpatient Short Form  AM-PAC Mobility Inpatient   How much difficulty turning over in bed?: A Little  How much difficulty sitting down on / standing up from a chair with arms?: A Little  How much difficulty moving from lying on back to sitting on side of bed?: A Little  How much help from another person moving to and from a bed to a chair?: A Lot  How much help from another person needed to walk in hospital room?: A Lot  How much help from another person for climbing 3-5 steps with a railing?: A Lot  AM-PAC Inpatient Mobility Raw Score : 15  AM-PAC Inpatient T-Scale Score : 39.45  Mobility Inpatient CMS 0-100% Score: 57.7  Mobility Inpatient CMS G-Code Modifier : CK    SUBJECTIVE:   Ms. Elizabeth Kenyon states, \"I'll have a caffeine free ginger ale\"     Social/Functional Lives With:  (has to get up 16 stairs to get into 2nd story apt)  Type of Home: House  Home Layout: Two level  Bathroom Shower/Tub: Walk-in shower  Bathroom Toilet: Handicap height  Bathroom Equipment: Shower chair  Home Equipment: Rollator  Has the patient had two or more falls in the past year or any fall with injury in the past year?: No  Receives Help From: Family  ADL Assistance: Independent  Active : No    OBJECTIVE:     PAIN: VITALS / O2: PRECAUTION / Jessica Medicine / DRAINS:   Pre Treatment:   Pain Assessment: 0-10  Pain Level: 3      Post Treatment: 3/10 Vitals        Oxygen      IV    RESTRICTIONS/PRECAUTIONS:  Restrictions/Precautions: Fall Risk                 GROSS EVALUATION: Intact Impaired (Comments):   AROM []  decreased global extension   PROM []    Strength []  decreased knee and hip extension strength   Balance []   posterior trunk clean    Posture [] Forward Head  Rounded Shoulders   Sensation [x]     Coordination [x]      Tone [x] Edema []    Activity Tolerance [] Patient limited by pain    []      COGNITION/  PERCEPTION: Intact Impaired (Comments):   Orientation [x]     Vision [x]     Hearing [x]     Cognition  [x]       MOBILITY: I Mod I S SBA CGA Min Mod Max Total  NT x2 Comments:   Bed Mobility    Rolling [] [] [] [] [] [] [] [] [] [x] []    Supine to Sit [] [] [] [] [] [] [] [] [] [x] []    Scooting [] [] [] [] [] [] [] [] [] [x] []    Sit to Supine [] [] [] [] [] [] [] [] [] [x] []    Transfers    Sit to Stand [] [] [] [] [] [x] [] [] [] [] [x]    Bed to Chair [] [] [] [] [] [] [x] [] [] [] [x] Toilet to chair   Stand to Sit [] [] [] [] [] [x] [] [] [] [] [x]     [] [] [] [] [] [] [] [] [] [] []    I=Independent, Mod I=Modified Independent, S=Supervision, SBA=Standby Assistance, CGA=Contact Guard Assistance,   Min=Minimal Assistance, Mod=Moderate Assistance, Max=Maximal Assistance, Total=Total Assistance, NT=Not Tested    GAIT: I Mod I S SBA CGA Min Mod Max Total  NT x2 Comments:   Level of Assistance [] [] [] [] [] [] [x] [] [] [] [x]    Distance  (x15, 2 x 6) feet    DME HHAx2    Gait Quality Decreased step clearance, Decreased step length and Shuffling     Weightbearing Status      Stairs      I=Independent, Mod I=Modified Independent, S=Supervision, SBA=Standby Assistance, CGA=Contact Guard Assistance,   Min=Minimal Assistance, Mod=Moderate Assistance, Max=Maximal Assistance, Total=Total Assistance, NT=Not Tested    PLAN:   ACUTE PHYSICAL THERAPY GOALS:   (Developed with and agreed upon by patient and/or caregiver. )    LTG:  (1.)Ms. Dhillon Maxin will move from supine to sit and sit to supine , scoot up and down and roll side to side in bed with MINIMAL ASSIST within 7 treatment day(s). (2.)Ms. Jacquie Hernadez will transfer from bed to chair and chair to bed with MINIMAL ASSIST using the least restrictive device within 7 treatment day(s). (3.)Ms. Jacquie Hernadez will ambulate with MINIMAL ASSIST for 50 feet with the least restrictive device within 7 treatment day(s). (4.)Ms. Robert Mariano will tolerate at least 23 min of dynamic standing activity to assist standing ADLs with the least restrictive device within 7 treatment days. (5.)Ms. Robert Mariano will climb at least 16 steps with MODERATE ASSIST with the least restrictive device within 7 treatment days. ________________________________________________________________________________________________        FREQUENCY AND DURATION: 3 times/week for duration of hospital stay or until stated goals are met, whichever comes first.    THERAPY PROGNOSIS: Good    PROBLEM LIST:   (Skilled intervention is medically necessary to address:)  Decreased ADL/Functional Activities  Decreased Activity Tolerance  Decreased AROM/PROM  Decreased Balance  Decreased Coordination  Decreased Gait Ability  Decreased Safety Awareness  Decreased Strength  Decreased Transfer Abilities  Increased Pain INTERVENTIONS PLANNED:   (Benefits and precautions of physical therapy have been discussed with the patient.)  Self Care Training  Therapeutic Activity  Therapeutic Exercise/HEP  Neuromuscular Re-education  Gait Training  Manual Therapy  Modalities  Education       TREATMENT:   EVALUATION: LOW COMPLEXITY: (Untimed Charge)    TREATMENT:   Co-Treatment PT/OT necessary due to patient's decreased overall endurance/tolerance levels, as well as need for high level skilled assistance to complete functional transfers/mobility and functional tasks  Therapeutic Activity (39 Minutes): Therapeutic activity included Transfer Training, Ambulation on level ground, Sitting balance , Standing balance and toilet transfers to improve functional Activity tolerance, Balance, Coordination, Mobility and Strength.     TREATMENT GRID:  N/A    AFTER TREATMENT PRECAUTIONS: Alarm Activated, Bed/Chair Locked, Call light within reach and RN notified    INTERDISCIPLINARY COLLABORATION:  RN/ PCT, PT/ PTA and OT/ ABBOTT    EDUCATION: Education Given To: Patient    TIME IN/OUT:  Time In: 2978  Time Out: [address]  Minutes: [address], [place]

## 2022-06-06 ENCOUNTER — APPOINTMENT (OUTPATIENT)
Dept: GENERAL RADIOLOGY | Age: 87
DRG: 200 | End: 2022-06-06
Attending: FAMILY MEDICINE
Payer: MEDICARE

## 2022-06-06 LAB
GLUCOSE BLD STRIP.AUTO-MCNC: 146 MG/DL (ref 65–100)
GLUCOSE BLD STRIP.AUTO-MCNC: 177 MG/DL (ref 65–100)
GLUCOSE BLD STRIP.AUTO-MCNC: 199 MG/DL (ref 65–100)
GLUCOSE BLD STRIP.AUTO-MCNC: 238 MG/DL (ref 65–100)
SERVICE CMNT-IMP: ABNORMAL

## 2022-06-06 PROCEDURE — 1100000000 HC RM PRIVATE

## 2022-06-06 PROCEDURE — 6370000000 HC RX 637 (ALT 250 FOR IP): Performed by: HOSPITALIST

## 2022-06-06 PROCEDURE — 99232 SBSQ HOSP IP/OBS MODERATE 35: CPT | Performed by: INTERNAL MEDICINE

## 2022-06-06 PROCEDURE — 6360000002 HC RX W HCPCS: Performed by: INTERNAL MEDICINE

## 2022-06-06 PROCEDURE — 71045 X-RAY EXAM CHEST 1 VIEW: CPT

## 2022-06-06 PROCEDURE — 6370000000 HC RX 637 (ALT 250 FOR IP): Performed by: INTERNAL MEDICINE

## 2022-06-06 PROCEDURE — 6370000000 HC RX 637 (ALT 250 FOR IP): Performed by: NURSE PRACTITIONER

## 2022-06-06 PROCEDURE — 83051 HEMOGLOBIN PLASMA: CPT

## 2022-06-06 PROCEDURE — 6360000002 HC RX W HCPCS: Performed by: EMERGENCY MEDICINE

## 2022-06-06 PROCEDURE — 36415 COLL VENOUS BLD VENIPUNCTURE: CPT

## 2022-06-06 PROCEDURE — 82962 GLUCOSE BLOOD TEST: CPT

## 2022-06-06 RX ORDER — OXYCODONE HYDROCHLORIDE 5 MG/1
5 TABLET ORAL EVERY 6 HOURS
Status: DISCONTINUED | OUTPATIENT
Start: 2022-06-06 | End: 2022-06-07

## 2022-06-06 RX ORDER — HYDRALAZINE HYDROCHLORIDE 20 MG/ML
10 INJECTION INTRAMUSCULAR; INTRAVENOUS ONCE
Status: COMPLETED | OUTPATIENT
Start: 2022-06-06 | End: 2022-06-06

## 2022-06-06 RX ORDER — OXYCODONE HYDROCHLORIDE 5 MG/1
5 TABLET ORAL EVERY 4 HOURS PRN
Status: DISCONTINUED | OUTPATIENT
Start: 2022-06-06 | End: 2022-06-09 | Stop reason: ALTCHOICE

## 2022-06-06 RX ORDER — AMLODIPINE BESYLATE 10 MG/1
10 TABLET ORAL DAILY
Status: DISCONTINUED | OUTPATIENT
Start: 2022-06-07 | End: 2022-06-07

## 2022-06-06 RX ORDER — POLYETHYLENE GLYCOL 3350 17 G/17G
17 POWDER, FOR SOLUTION ORAL DAILY PRN
Status: DISCONTINUED | OUTPATIENT
Start: 2022-06-06 | End: 2022-06-07 | Stop reason: SDUPTHER

## 2022-06-06 RX ORDER — DOCUSATE SODIUM 100 MG/1
100 CAPSULE, LIQUID FILLED ORAL DAILY
Status: DISCONTINUED | OUTPATIENT
Start: 2022-06-06 | End: 2022-06-10

## 2022-06-06 RX ADMIN — OXYCODONE 5 MG: 5 TABLET ORAL at 12:50

## 2022-06-06 RX ADMIN — AMLODIPINE BESYLATE 5 MG: 5 TABLET ORAL at 08:32

## 2022-06-06 RX ADMIN — OXYCODONE 10 MG: 5 TABLET ORAL at 08:32

## 2022-06-06 RX ADMIN — FERROUS SULFATE TAB 325 MG (65 MG ELEMENTAL FE) 325 MG: 325 (65 FE) TAB at 08:32

## 2022-06-06 RX ADMIN — INSULIN LISPRO 2 UNITS: 100 INJECTION, SOLUTION INTRAVENOUS; SUBCUTANEOUS at 17:08

## 2022-06-06 RX ADMIN — METOPROLOL SUCCINATE 50 MG: 50 TABLET, EXTENDED RELEASE ORAL at 08:32

## 2022-06-06 RX ADMIN — INSULIN GLARGINE 5 UNITS: 100 INJECTION, SOLUTION SUBCUTANEOUS at 20:51

## 2022-06-06 RX ADMIN — DOCUSATE SODIUM 100 MG: 100 CAPSULE, LIQUID FILLED ORAL at 09:19

## 2022-06-06 RX ADMIN — ONDANSETRON 4 MG: 2 INJECTION INTRAMUSCULAR; INTRAVENOUS at 08:32

## 2022-06-06 RX ADMIN — VITAMIN D, TAB 1000IU (100/BT) 5000 UNITS: 25 TAB at 08:33

## 2022-06-06 RX ADMIN — PANTOPRAZOLE SODIUM 40 MG: 40 TABLET, DELAYED RELEASE ORAL at 06:01

## 2022-06-06 RX ADMIN — HYDRALAZINE HYDROCHLORIDE 10 MG: 20 INJECTION INTRAMUSCULAR; INTRAVENOUS at 23:31

## 2022-06-06 RX ADMIN — OXYCODONE 5 MG: 5 TABLET ORAL at 17:09

## 2022-06-06 ASSESSMENT — PAIN SCALES - GENERAL
PAINLEVEL_OUTOF10: 5
PAINLEVEL_OUTOF10: 0
PAINLEVEL_OUTOF10: 7

## 2022-06-06 NOTE — WOUND CARE
Patient seen for left forearm skin tear. Discussed with nurse, patient was draining and bleeding with dressing changes and requiring frequent dressing changes. Reported foam dressing had been in use. Currently noted linear skin tear with steri strips in place with vaseline gauze dressing. No new drainage noted. Suspect old dressing was re-injuring tear with removal. Continue steri strips and start xeroform dressing daily. Patient updated along with nurse. Will sign off, please call if needed further.

## 2022-06-06 NOTE — PROGRESS NOTES
Roney Martin  Admission Date: 6/3/2022         Daily Progress Note: 6/6/2022    The patient's chart is reviewed and the patient is discussed with the staff. Background:  80year old female presents to the ER at [facility] complaining of a recent fall after feeling dizzy. She has left sided back pain. Kathyrn Can shows a small left pneumothorax with multiple rib fractures involving the 3rd, 4th, 5th and 6th ribs. There is also a small left pneumothorax in the left base noted on the thoracic CT.      She was found to be anemic in the ER with a hemoglobin of 7. Her stool is heme +. GI has seen and is monitoring for now. She is on a PPI and off the nonsteroids that she was taking at home.   [facility] other medical history includes asthma, diabetes, aortic valve stenosis, GERD, and hypertension.       Subjective:     She is having a lot of pain despite taking Morphine about every 4 hours. Reluctant to do any activity due to this. Also has a sore throat.      Current Facility-Administered Medications   Medication Dose Route Frequency    ferrous sulfate (IRON 325) tablet 325 mg  325 mg Oral Daily with breakfast    oxyCODONE (ROXICODONE) immediate release tablet 5 mg  5 mg Oral Q4H PRN    oxyCODONE (ROXICODONE) immediate release tablet 10 mg  10 mg Oral Q4H PRN    insulin glargine (LANTUS) injection vial 5 Units  5 Units SubCUTAneous Nightly    metoprolol succinate (TOPROL XL) extended release tablet 50 mg  50 mg Oral Daily    pantoprazole (PROTONIX) tablet 40 mg  40 mg Oral QAM AC    0.9 % sodium chloride infusion   IntraVENous PRN    acetaminophen (TYLENOL) tablet 650 mg  650 mg Oral Q6H PRN    Or    acetaminophen (TYLENOL) suppository 650 mg  650 mg Rectal Q6H PRN    ondansetron (ZOFRAN-ODT) disintegrating tablet 4 mg  4 mg Oral Q8H PRN    Or    ondansetron (ZOFRAN) injection 4 mg  4 mg IntraVENous Q6H PRN    polyethylene glycol (GLYCOLAX) packet 17 g  17 g Oral Daily PRN    albuterol sulfate  (90 Base) MCG/ACT inhaler 2 puff  2 puff Inhalation Q4H PRN    amLODIPine (NORVASC) tablet 5 mg  5 mg Oral Daily    Vitamin D (CHOLECALCIFEROL) tablet 5,000 Units  5,000 Units Oral Daily    insulin lispro (HUMALOG) injection vial 0-8 Units  0-8 Units SubCUTAneous TID WC    insulin lispro (HUMALOG) injection vial 0-4 Units  0-4 Units SubCUTAneous Nightly    glucose chewable tablet 16 g  4 tablet Oral PRN    dextrose bolus 10% 125 mL  125 mL IntraVENous PRN    Or    dextrose bolus 10% 250 mL  250 mL IntraVENous PRN    glucagon injection 1 mg  1 mg IntraMUSCular PRN    dextrose 5 % solution  100 mL/hr IntraVENous PRN     Review of Systems  + sore throat  Constitutional: negative for fever, chills, sweats  Cardiovascular: negative for chest pain, palpitations, syncope,  but some edema around left ankle  Gastrointestinal:  negative for dysphagia, reflux, vomiting, diarrhea, abdominal pain, or melena  Neurologic:  negative for focal weakness, numbness, headache    Objective:   Blood pressure (!) 154/53, pulse 96, temperature 98.1 °F (36.7 °C), temperature source Oral, resp. rate 20, height 5' 4\" (1.626 m), weight 104 lb (47.2 kg), SpO2 98 %. No intake or output data in the 24 hours ending 06/06/22 0818  Physical Exam:   Constitution:  the patient is well developed and in no acute distress  HEENT:  Sclera clear, pupils equal, oral mucosa moist  Respiratory: clear but overall decreased. Wearing oxygen  Cardiovascular:  RRR with + systolic murmur. Gastrointestinal: soft and non-tender; with positive bowel sounds. Musculoskeletal: warm without cyanosis. There is some edema around the left ankle.     Skin:  no jaundice or rashes, no wounds   Neurologic: no gross neuro deficits     Psychiatric:  alert and oriented, calm    CXR:       LAB:  Recent Labs     06/03/22  2030 06/04/22 0314 06/04/22  0745   WBC  --  11.0  --    HGB 6.4* 6.3* 8.0*   HCT  --  20.6* 25.3*   PLT  --  266  --      Recent Labs     06/04/22 0314 *   K 3.5      CO2 23   BUN 19   CREATININE 0.90   MG 1.7*     No results for input(s): TROPHS, NTPROBNP, CRP, ESR in the last 72 hours. Recent Labs     06/04/22  0314   GLUCOSE 201*      Microbiology:   No results for input(s): CULTURE in the last 72 hours. ECHO: 12/02/21    TRANSTHORACIC ECHOCARDIOGRAM (TTE) COMPLETE (CONTRAST/BUBBLE/3D PRN) 12/03/2021  7:46 AM (Final)    Narrative  This is a summary report. The complete report is available in the patient's medical record. If you cannot access the medical record, please contact the sending organization for a detailed fax or copy. · LV: Estimated LVEF is >70%. Normal cavity size. Mild concentric hypertrophy. Hyperdynamic systolic function. Left ventricular diastolic dysfunction. · AV: Aortic valve leaflet calcification present. Aortic valve mean gradient is 21 mmHg. Aortic valve area is 1.6 cm2. Moderate aortic valve stenosis is present. · TV: Mild tricuspid valve regurgitation is present. Right Ventricular Arterial Pressure (RVSP) is 29 mmHg. · LA: Mildly dilated left atrium. · RA: Mildly dilated right atrium. · MV: Mitral valve non-specific thickening. Mild mitral annular calcification. Mild mitral valve regurgitation is present. · PV: Mild pulmonic valve regurgitation is present. Signed by: Denny Allen MD on 12/3/2021  7:46 AM    Assessment and Plan:  (Medical Decision Making)   Principal Problem:    Pneumothorax, traumatic  Plan: Tiny per CXR today. Can wean off oxygen. No intervention planned. Has multiple rib fractures. Pain not well controlled. Using Roxicodone - will schedule and reassess pain control. She is currently not constipated but will add daily stool softener with prn laxative. Active Problems:    GI bleed  Plan: Hgb up to 8 after transfusion. Off nsaid from home. On PPI    Acute blood loss anemia (ABLA)  Plan: as above.  On Fe supplement    Aortic valve stenosis  Plan: noted murmur on exam    Type 2 diabetes mellitus ([facility] [address]  Plan: BS mid 100s    Hypertension  Plan: controlled      More than 50% of the time documented was spent in face-to-face contact with the patient and in the care of the patient on the floor/unit where the patient is located. PRINCE Carlisle - CNP     I have spoken with and examined the patient. I agree with the above assessment and plan as documented. I contributed more than 50% of the clinical time to this encounter today. Gen: Elderly F in some distress due to pain  Lungs:  Decreased BS bilaterally from splinting. Heart:  iRR with 2/6 SM LSB  Abd: soft  Ext: no edema    CXR 6/6:        81 yo F with traumatic PTX on L being managed conservatively with N2 washout. PTX almost resolved this AM. Still with lots of pain from R rib fxs. Additional analgesia ordered along with stool softener. Check CXR tomorrow to assure continued improvement in PTX.      Karlie Gandara MD

## 2022-06-06 NOTE — PROGRESS NOTES
Patient resting in bed on 4L NC. A&Ox4. Respirations even and unlabored. Patient denies pain and is in no acute distress at this time. Family at bedside. No needs expressed. Call light within reach, will continue to monitor.

## 2022-06-06 NOTE — PROGRESS NOTES
Hospitalist Progress Note   Admit Date:  6/3/2022  4:48 PM   Name:  Puneet Vasquez   Age:  80 y.o. Sex:  female  :  1933   MRN:  [id number]   Room:  803/    Presenting Complaint: No chief complaint on file. Reason(s) for Admission: Traumatic pneumothorax, initial encounter [S27. [place] Course & Interval History:   80 y. o. female with medical history of HTN, DM2, AV stenosis who presented to the [place] [id number] after a fall onto her back, noted becoming dizzy in bathroom and falling. Denies LOC or head injury.  She had L sided back pain immediately after and since then.  Upon ER evaluation, CXR shows a \"small left apical pneumothorax\" and \"very small left pleural effusion. \" with multiple rib fractures. Incidentally, her Hgb was found to be 7.0.  Hemoccult was obtained which was positive. Patient was admitted to [facility] for hospitalization. Her Hgb remained stable at 7.0. Repeat CXR showed slightly larger pneumothorax with tracheal shift and rib fractures. Pulmonology reviewed the films and requested transfer to  for possible chest tube but decided against it due to the amount of pain it would cause with rib fractures. Pulmonology continues to follow the case.     PT/OT with STR recs. CM assisting. Noted cannot take PPD, will use CXR, no evidence of TB. Subjective/24hr Events (22): Patient alert, oriented x 3, appears frail but is conversational, in good spirits, [place]. Denies chest pain, SOB, abdominal pain.      Assessment & Plan:     Pneumothorax, traumatic  -Pulmonology following  -No chest tube for now, cont to follow CXR daily  -O2 99% on RA  -Scheduled Roxicodone for pain per pulm, follow closely in this elderly female    GI bleed, ruled out per GI 6/3, no endoscopy   -[facility]    Acute blood loss anemia (ABLA)  -Likely secondary to fall-related bruising  -Hold home NSAID's, continue PPI  -s/p 1 U PRBC  for Hgb 6.3  -Cont PO iron, labs show deficiency  -Last H&H evaluated 6/4 8.0/25.3, was ordered this am but not completed  -Re-evaluate H&H in am    Aortic valve stenosis  -Noted    Type 2 diabetes mellitus (HCC)  -Cont SSI Humalog ACHS, Lantus 5 units, carb-controlled diet    Hypertension  -Cont Toprol, amlodipine  -Increase amlodipine, remains uncontrolled    Discharge Planning:  Pending STR placement     Diet:  ADULT DIET; Easy to Chew; 4 carb choices (60 gm/meal)  DVT PPx: SCD's  Code status: Full Code    Hospital Problems:  Principal Problem:    Pneumothorax, traumatic  Active Problems:    GI bleed    Traumatic pneumothorax    Acute blood loss anemia (ABLA)    Aortic valve stenosis    Type 2 diabetes mellitus ([facility] [address]    Hypertension  Resolved Problems:    * No resolved hospital problems. *      Objective:     Patient Vitals for the past 24 hrs:   Temp Pulse Resp BP SpO2   06/06/22 1503 98.1 °F (36.7 °C) 98 18 (!) 167/71 100 %   06/06/22 1059 97.9 °F (36.6 °C) 92 18 (!) 146/67 100 %   06/06/22 0720 98.1 °F (36.7 °C) 96 20 (!) 154/53 98 %   06/06/22 0412 97.9 °F (36.6 °C) 97 20 (!) 155/64 99 %   06/06/22 0008 97 °F (36.1 °C) 95 20 (!) 140/60 99 %   06/05/22 1946 97.5 °F (36.4 °C) 97 20 (!) 145/63 100 %       Oxygen Therapy  SpO2: 100 %  O2 Device: None (Room air)  Skin Assessment: Clean, dry, & intact  O2 Flow Rate (L/min): 6 L/min    Estimated body mass index is 17.85 kg/m² as calculated from the following:    Height as of this encounter: 5' 4\" (1.626 m). Weight as of this encounter: 104 lb (47.2 kg). Intake/Output Summary (Last 24 hours) at 6/6/2022 1621  Last data filed at 6/6/2022 1608  Gross per 24 hour   Intake --   Output 200 ml   Net -200 ml         Physical Exam:   Blood pressure (!) 167/71, pulse 98, temperature 98.1 °F (36.7 °C), temperature source Oral, resp. rate 18, height 5' 4\" (1.626 m), weight 104 lb (47.2 kg), SpO2 100 %.   General:    Frail, [place], alert  Head:  Normocephalic, atraumatic  Eyes:  Sclerae appear normal. Pupils equally round. ENT:  Nares appear normal, no drainage. Moist oral mucosa  Neck:  No restricted ROM. Trachea midline   CV:   RRR. + murmur. No jugular venous distension. Lungs:   CTAB. No wheezing, rhonchi, or rales. Respirations even, unlabored  Abdomen: Bowel sounds present. Soft, nontender, nondistended. Extremities: No cyanosis or clubbing. No edema  Skin:     No rashes and normal coloration. Warm and dry. Scattered bruising   Neuro:  CN II-XII grossly intact. Sensation intact. A&Ox3. Moves all extremities. No focal deficits. Psych:  Normal mood and affect.       I have reviewed ordered lab tests and independently visualized imaging below:    Recent Labs:  Recent Results (from the past 48 hour(s))   POCT Glucose    Collection Time: 06/04/22  8:41 PM   Result Value Ref Range    POC Glucose 255 (H) 65 - 100 mg/dL    Performed by: Stephany    Transferrin Saturation    Collection Time: 06/05/22  2:52 AM   Result Value Ref Range    Iron 10 (L) 35 - 150 ug/dL    TIBC 279 250 - 450 ug/dL    TRANSFERRIN SATURATION 4 (L) >20 %   Ferritin    Collection Time: 06/05/22  2:52 AM   Result Value Ref Range    Ferritin 109 8 - 388 NG/ML   POCT Glucose    Collection Time: 06/05/22  8:00 AM   Result Value Ref Range    POC Glucose 247 (H) 65 - 100 mg/dL    Performed by: Dominique    POCT Glucose    Collection Time: 06/05/22 12:03 PM   Result Value Ref Range    POC Glucose 251 (H) 65 - 100 mg/dL    Performed by: Марина    POCT Glucose    Collection Time: 06/05/22  4:22 PM   Result Value Ref Range    POC Glucose 247 (H) 65 - 100 mg/dL    Performed by: Марина    POCT Glucose    Collection Time: 06/05/22  9:18 PM   Result Value Ref Range    POC Glucose 171 (H) 65 - 100 mg/dL    Performed by: Stephany    POCT Glucose    Collection Time: 06/06/22  7:52 AM   Result Value Ref Range    POC Glucose 146 (H) 65 - 100 mg/dL    Performed by: Nadia    POCT Glucose Collection Time: 06/06/22 11:28 AM   Result Value Ref Range    POC Glucose 177 (H) 65 - 100 mg/dL    Performed by: Nadia    POCT Glucose    Collection Time: 06/06/22  4:06 PM   Result Value Ref Range    POC Glucose 238 (H) 65 - 100 mg/dL    Performed by: Марина        Other Studies:  XR CHEST 1 VIEW    Result Date: 6/6/2022  EXAM: Chest x-ray. INDICATION: Dyspnea. COMPARISON: Yesterday's chest x-ray. TECHNIQUE: Frontal view chest x-ray. FINDINGS: There is a decreased tiny left apical pneumothorax. Left lung base atelectasis or contusion and a small left pleural effusion are unchanged. There is a new small right pleural effusion. Right lung remains clear. The heart size is normal. There are left-sided rib fractures. 1. Decreased tiny left apical pneumothorax. 2. Unchanged left lung base atelectasis or contusion. 3. Small bilateral pleural effusions, unchanged on the left and new on the right. XR CHEST 1 VIEW    Result Date: 6/5/2022  EXAM: Chest x-ray. INDICATION: Follow-up pneumothorax. COMPARISON: Yesterday's chest x-ray. TECHNIQUE: Frontal view chest x-ray. FINDINGS: There is a slightly enlarged small left apical pneumothorax, in this patient with multiple left rib fractures. Left lung base atelectasis or contusion and a small left pleural effusion are unchanged. Right lung and pleural space remain clear. The heart size is normal.     1. Slightly enlarged small left apical pneumothorax, in this patient with multiple left rib fractures. 2. Unchanged left lung base atelectasis or contusion and small left pleural effusion.       Current Meds:  Current Facility-Administered Medications   Medication Dose Route Frequency    docusate sodium (COLACE) capsule 100 mg  100 mg Oral Daily    polyethylene glycol (GLYCOLAX) packet 17 g  17 g Oral Daily PRN    oxyCODONE (ROXICODONE) immediate release tablet 5 mg  5 mg Oral Q6H    oxyCODONE (ROXICODONE) immediate release tablet 5 mg  5 mg Oral Q4H PRN    ferrous sulfate (IRON 325) tablet 325 mg  325 mg Oral Daily with breakfast    insulin glargine (LANTUS) injection vial 5 Units  5 Units SubCUTAneous Nightly    metoprolol succinate (TOPROL XL) extended release tablet 50 mg  50 mg Oral Daily    pantoprazole (PROTONIX) tablet 40 mg  40 mg Oral QAM AC    0.9 % sodium chloride infusion   IntraVENous PRN    acetaminophen (TYLENOL) tablet 650 mg  650 mg Oral Q6H PRN    Or    acetaminophen (TYLENOL) suppository 650 mg  650 mg Rectal Q6H PRN    ondansetron (ZOFRAN-ODT) disintegrating tablet 4 mg  4 mg Oral Q8H PRN    Or    ondansetron (ZOFRAN) injection 4 mg  4 mg IntraVENous Q6H PRN    polyethylene glycol (GLYCOLAX) packet 17 g  17 g Oral Daily PRN    albuterol sulfate  (90 Base) MCG/ACT inhaler 2 puff  2 puff Inhalation Q4H PRN    amLODIPine (NORVASC) tablet 5 mg  5 mg Oral Daily    Vitamin D (CHOLECALCIFEROL) tablet 5,000 Units  5,000 Units Oral Daily    insulin lispro (HUMALOG) injection vial 0-8 Units  0-8 Units SubCUTAneous TID WC    insulin lispro (HUMALOG) injection vial 0-4 Units  0-4 Units SubCUTAneous Nightly    glucose chewable tablet 16 g  4 tablet Oral PRN    dextrose bolus 10% 125 mL  125 mL IntraVENous PRN    Or    dextrose bolus 10% 250 mL  250 mL IntraVENous PRN    glucagon injection 1 mg  1 mg IntraMUSCular PRN    dextrose 5 % solution  100 mL/hr IntraVENous PRN     Labs, vital signs, diagnostic imaging reviewed. Case discussed with patient, care team, Dr. Nury Jones.   Signed: Katherine Robison [facility]

## 2022-06-06 NOTE — CARE COORDINATION
MSN,  CM:  spoke with patient this AM about discharge planning. Patient decided on rehab prior to going home r/t recent fall. Referral to be sent out today. Case Management will continue to follow. 06/06/22 0911   Service Assessment   Patient Orientation Alert and Oriented   Cognition Alert   History Provided By Child/Family   Primary [address]   Patient's Healthcare Decision Maker is: Named in [address] [place] [address]  Marla Rees (daughter))   PCP Verified by CM Yes  (NP)   Last Visit to PCP Within last 6 months   Prior Functional Level Independent in ADLs/IADLs   Current Functional Level Independent in ADLs/IADLs   Can patient return to prior living arrangement Yes   Ability to make needs known: Good   Family able to assist with home care needs: Yes   Would you like for me to discuss the discharge plan with any other family members/significant others, and if so, who?  Yes  (children)   Financial Resources Medicare   Social/Functional History   Lives With Family   Type of [address] Two level;Bed/Bath upstairs   Home Access Level entry   Bathroom Shower/Tub Walk-in shower   Bathroom Toilet Handicap height   [address] [place] chair;Hand-held shower   [address] Rollator;Grab bars   [facility]   Homemaking Responsibilities Yes   Laundry Responsibility Primary   Cleaning Responsibility Primary   Bill Paying/Finance Responsibility Primary   [address] Management Primary   Ambulation Assistance Independent   Transfer Assistance Independent   Active  No   Patient's [address] - daughter   Mode of Transportation Car   Occupation Retired   Discharge Planning   Type of [address]

## 2022-06-07 ENCOUNTER — APPOINTMENT (OUTPATIENT)
Dept: GENERAL RADIOLOGY | Age: 87
DRG: 200 | End: 2022-06-07
Attending: FAMILY MEDICINE
Payer: MEDICARE

## 2022-06-07 LAB
ANION GAP SERPL CALC-SCNC: 12 MMOL/L (ref 7–16)
BUN SERPL-MCNC: 19 MG/DL (ref 8–23)
CALCIUM SERPL-MCNC: 8.7 MG/DL (ref 8.3–10.4)
CHLORIDE SERPL-SCNC: 96 MMOL/L (ref 98–107)
CO2 SERPL-SCNC: 24 MMOL/L (ref 21–32)
CREAT SERPL-MCNC: 0.8 MG/DL (ref 0.6–1)
GLUCOSE BLD STRIP.AUTO-MCNC: 113 MG/DL (ref 65–100)
GLUCOSE BLD STRIP.AUTO-MCNC: 169 MG/DL (ref 65–100)
GLUCOSE BLD STRIP.AUTO-MCNC: 170 MG/DL (ref 65–100)
GLUCOSE BLD STRIP.AUTO-MCNC: 208 MG/DL (ref 65–100)
GLUCOSE SERPL-MCNC: 108 MG/DL (ref 65–100)
HCT VFR BLD AUTO: 25.3 % (ref 35.8–46.3)
HGB BLD-MCNC: 7.9 G/DL (ref 11.7–15.4)
MAGNESIUM SERPL-MCNC: 1.7 MG/DL (ref 1.8–2.4)
POTASSIUM SERPL-SCNC: 3.8 MMOL/L (ref 3.5–5.1)
SERVICE CMNT-IMP: ABNORMAL
SODIUM SERPL-SCNC: 132 MMOL/L (ref 136–145)

## 2022-06-07 PROCEDURE — 6370000000 HC RX 637 (ALT 250 FOR IP): Performed by: STUDENT IN AN ORGANIZED HEALTH CARE EDUCATION/TRAINING PROGRAM

## 2022-06-07 PROCEDURE — 97535 SELF CARE MNGMENT TRAINING: CPT

## 2022-06-07 PROCEDURE — 6360000002 HC RX W HCPCS: Performed by: STUDENT IN AN ORGANIZED HEALTH CARE EDUCATION/TRAINING PROGRAM

## 2022-06-07 PROCEDURE — 83051 HEMOGLOBIN PLASMA: CPT

## 2022-06-07 PROCEDURE — 36415 COLL VENOUS BLD VENIPUNCTURE: CPT

## 2022-06-07 PROCEDURE — 6370000000 HC RX 637 (ALT 250 FOR IP): Performed by: NURSE PRACTITIONER

## 2022-06-07 PROCEDURE — 85014 HEMATOCRIT: CPT

## 2022-06-07 PROCEDURE — 71045 X-RAY EXAM CHEST 1 VIEW: CPT

## 2022-06-07 PROCEDURE — 82962 GLUCOSE BLOOD TEST: CPT

## 2022-06-07 PROCEDURE — 99232 SBSQ HOSP IP/OBS MODERATE 35: CPT | Performed by: INTERNAL MEDICINE

## 2022-06-07 PROCEDURE — 97530 THERAPEUTIC ACTIVITIES: CPT

## 2022-06-07 PROCEDURE — 1100000000 HC RM PRIVATE

## 2022-06-07 PROCEDURE — 80048 BASIC METABOLIC PNL TOTAL CA: CPT

## 2022-06-07 PROCEDURE — 6370000000 HC RX 637 (ALT 250 FOR IP): Performed by: HOSPITALIST

## 2022-06-07 PROCEDURE — 6370000000 HC RX 637 (ALT 250 FOR IP): Performed by: INTERNAL MEDICINE

## 2022-06-07 PROCEDURE — 97110 THERAPEUTIC EXERCISES: CPT

## 2022-06-07 PROCEDURE — 83735 ASSAY OF MAGNESIUM: CPT

## 2022-06-07 RX ORDER — MAGNESIUM SULFATE IN WATER 40 MG/ML
2000 INJECTION, SOLUTION INTRAVENOUS ONCE
Status: COMPLETED | OUTPATIENT
Start: 2022-06-07 | End: 2022-06-07

## 2022-06-07 RX ORDER — OXYCODONE HYDROCHLORIDE 5 MG/1
10 TABLET ORAL EVERY 6 HOURS
Status: DISCONTINUED | OUTPATIENT
Start: 2022-06-07 | End: 2022-06-08

## 2022-06-07 RX ORDER — AMLODIPINE BESYLATE 5 MG/1
5 TABLET ORAL DAILY
Status: DISCONTINUED | OUTPATIENT
Start: 2022-06-08 | End: 2022-06-12

## 2022-06-07 RX ADMIN — PANTOPRAZOLE SODIUM 40 MG: 40 TABLET, DELAYED RELEASE ORAL at 06:21

## 2022-06-07 RX ADMIN — OXYCODONE 5 MG: 5 TABLET ORAL at 08:27

## 2022-06-07 RX ADMIN — INSULIN GLARGINE 5 UNITS: 100 INJECTION, SOLUTION SUBCUTANEOUS at 21:26

## 2022-06-07 RX ADMIN — INSULIN LISPRO 2 UNITS: 100 INJECTION, SOLUTION INTRAVENOUS; SUBCUTANEOUS at 17:18

## 2022-06-07 RX ADMIN — MAGNESIUM SULFATE HEPTAHYDRATE 2000 MG: 40 INJECTION, SOLUTION INTRAVENOUS at 09:02

## 2022-06-07 RX ADMIN — OXYCODONE 10 MG: 5 TABLET ORAL at 12:23

## 2022-06-07 RX ADMIN — DOCUSATE SODIUM 100 MG: 100 CAPSULE, LIQUID FILLED ORAL at 08:27

## 2022-06-07 RX ADMIN — OXYCODONE 10 MG: 5 TABLET ORAL at 23:45

## 2022-06-07 RX ADMIN — FERROUS SULFATE TAB 325 MG (65 MG ELEMENTAL FE) 325 MG: 325 (65 FE) TAB at 08:27

## 2022-06-07 RX ADMIN — OXYCODONE 10 MG: 5 TABLET ORAL at 17:18

## 2022-06-07 RX ADMIN — ONDANSETRON 4 MG: 4 TABLET, ORALLY DISINTEGRATING ORAL at 08:26

## 2022-06-07 RX ADMIN — VITAMIN D, TAB 1000IU (100/BT) 5000 UNITS: 25 TAB at 08:26

## 2022-06-07 RX ADMIN — METOPROLOL SUCCINATE 50 MG: 50 TABLET, EXTENDED RELEASE ORAL at 08:27

## 2022-06-07 RX ADMIN — AMLODIPINE BESYLATE 10 MG: 10 TABLET ORAL at 08:27

## 2022-06-07 ASSESSMENT — PAIN SCALES - GENERAL
PAINLEVEL_OUTOF10: 6
PAINLEVEL_OUTOF10: 5
PAINLEVEL_OUTOF10: 5
PAINLEVEL_OUTOF10: 7
PAINLEVEL_OUTOF10: 2

## 2022-06-07 ASSESSMENT — PAIN DESCRIPTION - LOCATION: LOCATION: FLANK

## 2022-06-07 ASSESSMENT — PAIN DESCRIPTION - ORIENTATION: ORIENTATION: LEFT

## 2022-06-07 NOTE — PROGRESS NOTES
Veto Pain  Admission Date: 6/3/2022         Daily Progress Note: 6/7/2022    The patient's chart is reviewed and the patient is discussed with the staff. Background:  80year old female presents to the ER at [facility] complaining of a recent fall after feeling dizzy. She has left sided back pain. Daniele Carbo shows a small left pneumothorax with multiple rib fractures involving the 3rd, 4th, 5th and 6th ribs. There is also a small left pneumothorax in the left base noted on the thoracic CT.      She was found to be anemic in the ER with a hemoglobin of 7. Her stool is heme +. GI has seen and is monitoring for now. She is on a PPI and off the nonsteroids that she was taking at home.   [facility] other medical history includes asthma, diabetes, aortic valve stenosis, GERD, and hypertension.       Subjective:     Tried to turn her for exam and she screamed in pain - reports that it is a \"10\". Was able to sleep some last night.      Current Facility-Administered Medications   Medication Dose Route Frequency    docusate sodium (COLACE) capsule 100 mg  100 mg Oral Daily    oxyCODONE (ROXICODONE) immediate release tablet 5 mg  5 mg Oral Q6H    oxyCODONE (ROXICODONE) immediate release tablet 5 mg  5 mg Oral Q4H PRN    amLODIPine (NORVASC) tablet 10 mg  10 mg Oral Daily    ferrous sulfate (IRON 325) tablet 325 mg  325 mg Oral Daily with breakfast    insulin glargine (LANTUS) injection vial 5 Units  5 Units SubCUTAneous Nightly    metoprolol succinate (TOPROL XL) extended release tablet 50 mg  50 mg Oral Daily    pantoprazole (PROTONIX) tablet 40 mg  40 mg Oral QAM AC    0.9 % sodium chloride infusion   IntraVENous PRN    acetaminophen (TYLENOL) tablet 650 mg  650 mg Oral Q6H PRN    Or    acetaminophen (TYLENOL) suppository 650 mg  650 mg Rectal Q6H PRN    ondansetron (ZOFRAN-ODT) disintegrating tablet 4 mg  4 mg Oral Q8H PRN    Or    ondansetron (ZOFRAN) injection 4 mg  4 mg IntraVENous Q6H PRN    polyethylene glycol (GLYCOLAX) packet 17 g  17 g Oral Daily PRN    albuterol sulfate  (90 Base) MCG/ACT inhaler 2 puff  2 puff Inhalation Q4H PRN    Vitamin D (CHOLECALCIFEROL) tablet 5,000 Units  5,000 Units Oral Daily    insulin lispro (HUMALOG) injection vial 0-8 Units  0-8 Units SubCUTAneous TID WC    insulin lispro (HUMALOG) injection vial 0-4 Units  0-4 Units SubCUTAneous Nightly    glucose chewable tablet 16 g  4 tablet Oral PRN    dextrose bolus 10% 125 mL  125 mL IntraVENous PRN    Or    dextrose bolus 10% 250 mL  250 mL IntraVENous PRN    glucagon injection 1 mg  1 mg IntraMUSCular PRN    dextrose 5 % solution  100 mL/hr IntraVENous PRN     Review of Systems  + back pain (tail bone)  Constitutional: negative for fever, chills, sweats  Cardiovascular: negative for chest pain, palpitations, syncope,  but some edema around left ankle  Gastrointestinal:  negative for dysphagia, reflux, vomiting, diarrhea, abdominal pain, or melena  Neurologic:  negative for focal weakness, numbness, headache    Objective:   Blood pressure (!) 164/65, pulse (!) 103, temperature 98.1 °F (36.7 °C), resp. rate 18, height 5' 4\" (1.626 m), weight 104 lb (47.2 kg), SpO2 100 %. Intake/Output Summary (Last 24 hours) at 6/7/2022 0751  Last data filed at 6/6/2022 1608  Gross per 24 hour   Intake --   Output 200 ml   Net -200 ml     Physical Exam:   Constitution:  the patient is well developed and in mild distress due to being repositioned  HEENT:  Sclera clear, pupils equal, oral mucosa moist  Respiratory: clear but overall decreased. Wearing oxygen  Cardiovascular:  RRR with + systolic murmur. Gastrointestinal: soft and non-tender; with positive bowel sounds. Musculoskeletal: warm without cyanosis. There is some edema around the left ankle.     Skin:  no jaundice or rashes, kerlex wrap left forearm  Neurologic: no gross neuro deficits     Psychiatric:  alert and oriented, calm    CXR:   6/7 6/6      LAB:  Recent Labs     06/07/22  0443   HGB 7.9*   HCT 25.3*     Recent Labs     06/07/22  0443   *   K 3.8   CL 96*   CO2 24   BUN 19   CREATININE 0.80   MG 1.7*     No results for input(s): TROPHS, NTPROBNP, CRP, ESR in the last 72 hours. Recent Labs     06/07/22  0443   GLUCOSE 108*      Microbiology:   No results for input(s): CULTURE in the last 72 hours. ECHO: 12/02/21    TRANSTHORACIC ECHOCARDIOGRAM (TTE) COMPLETE (CONTRAST/BUBBLE/3D PRN) 12/03/2021  7:46 AM (Final)    Narrative  This is a summary report. The complete report is available in the patient's medical record. If you cannot access the medical record, please contact the sending organization for a detailed fax or copy. · LV: Estimated LVEF is >70%. Normal cavity size. Mild concentric hypertrophy. Hyperdynamic systolic function. Left ventricular diastolic dysfunction. · AV: Aortic valve leaflet calcification present. Aortic valve mean gradient is 21 mmHg. Aortic valve area is 1.6 cm2. Moderate aortic valve stenosis is present. · TV: Mild tricuspid valve regurgitation is present. Right Ventricular Arterial Pressure (RVSP) is 29 mmHg. · LA: Mildly dilated left atrium. · RA: Mildly dilated right atrium. · MV: Mitral valve non-specific thickening. Mild mitral annular calcification. Mild mitral valve regurgitation is present. · PV: Mild pulmonic valve regurgitation is present. Signed by: Jorge Warner MD on 12/3/2021  7:46 AM    Assessment and Plan:  (Medical Decision Making)   Principal Problem:    Pneumothorax, traumatic  Plan: resolved per CXR today. Can wean off oxygen. Has multiple rib fractures. Pain still not well controlled. Changed to scheduled Roxicodone yesterday but she is still hurting. Will increase dose today. Monitor for constipation. Active Problems:    GI bleed  Plan: Hgb up after transfusion. Off nsaid from home.  On PPI    Acute blood loss anemia (ABLA)  Plan: as above. On Fe supplement    Aortic valve stenosis  Plan: noted murmur on exam    Type 2 diabetes mellitus ([address]  Plan: BS low 100s    Hypertension  Plan: poor control today. Norvasc dose has already been increased by another provider. She plans to go to rehab from here. More than 50% of the time documented was spent in face-to-face contact with the patient and in the care of the patient on the floor/unit where the patient is located. PRINCE Bobby - CNP         I have spoken with and examined the patient. I agree with the above assessment and plan as documented. I contributed more than 50% of the clinical time to this encounter today. Gen: NAD, sitting up in chair  Lungs:  CTA B, no w/r/r  Heart:  RRR with no Murmur/Rubs/Gallops  Abd:soft, nt, nd, nabs  Ext: no le edema    Patient's ptx now resolved. Just dealing with pain from rib fractures. Cont pain control. Add IS. On RA. No additional pulmonary input at this time. We will sign off but please let us know if new issues arise.        Agatha Plummer MD

## 2022-06-07 NOTE — PROGRESS NOTES
OCCUPATIONAL THERAPY Daily Note     OT Visit Days: 2   Time  OT Charge Capture  Rehab Caseload Tracker  OT Orders    Guy Alanis is a 80 y.o. female   PRIMARY DIAGNOSIS: Pneumothorax, traumatic  Traumatic pneumothorax, initial encounter [S27. 0XXA]       Inpatient: Payor: Marquez Mirelesner / Plan: [address] PPO / Product Type: Medicare /     ASSESSMENT:     REHAB RECOMMENDATIONS: CURRENT LEVEL OF FUNCTION:  (Most Recently Demonstrated)   Recommendation to date pending progress:  Setting:   Short-term Rehab    Equipment:     None Bathing:   Maximal Assist  Dressing:   Moderate Assist  Feeding/Grooming:   Minimal Assist  Toileting:   Not Tested  Functional Mobility:   Not Tested     ASSESSMENT:  Ms. Ole Kehr remains limited by generalized weakness and deficits in endurance, mobility, balance, and cognition impacting ADLs. Pt presented very confused and required heavy multi modal cues to initiate, attend to, and complete all functional tasks. Pt declined standing across several attempts, required max A for repositioning in chair. Mod A for dressing, min-mod A for grooming/ hygiene. Pt is progressing towards goals, continue POC. SUBJECTIVE:     Ms. Ole Kehr states, \"don't make me. \"     Social/Functional Lives With: Family  Type of Home: House  Home Layout: Two level,Bed/Bath upstairs  Home Access: Level entry  Bathroom Shower/Tub: Walk-in shower  Bathroom Toilet: Handicap height  Bathroom Equipment: Shower chair,Hand-held shower  Bathroom Accessibility: Accessible  Home Equipment: Rollator,Grab bars  Has the patient had two or more falls in the past year or any fall with injury in the past year?: No  Receives Help From: Family  ADL Assistance: [address] [facility] [address]: [address] [place] [address] Responsibilities: Yes  Laundry Responsibility: Primary  Cleaning Responsibility: Primary  Bill Paying/Finance Responsibility: Primary  Shopping Responsibility: [id number] [facility] Responsibility: Primary  Health Care Management: Primary  Ambulation Assistance: Independent  Transfer Assistance: Independent  Active : No  Patient's  Info: Adelia Jones - anna  Mode of Transportation: Car  Occupation: Retired    OBJECTIVE:     Sandy Jump / Ashlie Daviesn / AIRWAY: [employer]    RESTRICTIONS/PRECAUTIONS:  Restrictions/Precautions  Restrictions/Precautions:  Other (comment)  Position Activity Restriction  Other position/activity restrictions: L rib fractures        PAIN: VITALS / O2:   Pre Treatment:   Pain Assessment: Face, Legs, Activity, Cry, and Consolability (FLACC)  Pain Level: 2  Pain Location: Flank  Pain Orientation: Left  Non-Pharmaceutical Pain Intervention(s): Repositioned          Post Treatment: 2 Vitals          Oxygen  O2 Therapy: Room air  O2 Device: Nasal cannula  O2 Flow Rate (L/min): 2 L/min (86% on room air; > 90% on 2L)         MOBILITY: I Mod I S SBA CGA Min Mod Max Total  NT x2 Comments:   Bed Mobility    Rolling [] [] [] [] [] [] [] [] [] [] []    Supine to Sit [] [] [] [] [] [] [] [] [] [] []    Scooting [] [] [] [] [] [] [] [] [] [] []    Sit to Supine [] [] [] [] [] [] [] [] [] [] []    Transfers    Sit to Stand [] [] [] [] [] [] [] [] [] [] []    Bed to Chair [] [] [] [] [] [] [] [] [] [] []    Stand to Sit [] [] [] [] [] [] [] [] [] [] []    Tub/Shower [] [] [] [] [] [] [] [] [] [] []     Toilet [] [] [] [] [] [] [] [] [] [] []      [] [] [] [] [] [] [] [] [] [] []    I=Independent, Mod I=Modified Independent, S=Supervision/Setup, SBA=Standby Assistance, CGA=Contact Guard Assistance, Min=Minimal Assistance, Mod=Moderate Assistance, Max=Maximal Assistance, Total=Total Assistance, NT=Not Tested    ACTIVITIES OF DAILY LIVING: I Mod I S SBA CGA Min Mod Max Total NT Comments   BASIC ADLs:              Bathing/ Showering [] [] [] [] [] [] [] [] [] []    Toileting [] [] [] [] [] [] [] [] [] []    Upper Body Dressing [] [] [] [] [] [] [x] [] [] []    Lower Body Dressing [] [] [] [] [] [] [] [] [] []    Feeding [] [] [] [] [] [] [] [] [] []    Grooming [] [] [] [] [] [x] [x] [] [] []    Personal Device Care [] [] [] [] [] [] [] [] [] []    Functional Mobility [] [] [] [] [] [] [] [] [] []    I=Independent, Mod I=Modified Independent, S=Supervision/Setup, SBA=Standby Assistance, CGA=Contact Guard Assistance, Min=Minimal Assistance, Mod=Moderate Assistance, Max=Maximal Assistance, Total=Total Assistance, NT=Not Tested    PLAN:     FREQUENCY/DURATION   OT Plan of Care: 3 times/week for duration of hospital stay or until stated goals are met, whichever comes first.    ACUTE OCCUPATIONAL THERAPY GOALS:   (Developed with and agreed upon by patient and/or caregiver.)  1. Patient will complete lower body bathing and dressing with SUPERVISION and adaptive equipment as needed. 2. Patient will complete toileting with SUPERVISION. 3. Patient will complete grooming ADL with SUPERVISION. 4. Patient will tolerate 25 minutes of OT treatment with 1-2 rest breaks to increase activity tolerance for ADLs. 5. Patient will complete functional transfers with SUPERVISION and adaptive equipment as needed. 6. Patient will tolerate 10 minutes BUE exercises to increase strength for safe, functional transfers.      Timeframe: 7 visits           TREATMENT:     TREATMENT:   Self Care: (25): Procedure(s) (per grid) utilized to improve and/or restore self-care/home management as related to dressing, grooming and self feeding. Required maximal visual, verbal, manual and tactile cueing to facilitate activities of daily living skills and compensatory activities.     TREATMENT GRID:  N/A    AFTER TREATMENT PRECAUTIONS: Alarm Activated, Bed/Chair Locked, Call light within reach, Chair, Needs within reach and RN notified    INTERDISCIPLINARY COLLABORATION:  RN/ PCT    EDUCATION:       TOTAL TREATMENT DURATION AND TIME:  Time In: 1451  Time Out: [address]  Minutes: [address] [place] [address] [place]

## 2022-06-07 NOTE — PROGRESS NOTES
Hospitalist Progress Note   Admit Date:  6/3/2022  4:48 PM   Name:  Sonia Sauer   Age:  80 y.o. Sex:  female  :  1933   MRN:  [id number]   Room:  803/    Presenting Complaint: No chief complaint on file. Reason(s) for Admission: Traumatic pneumothorax, initial encounter [S27. [place] Course & Interval History:   80 y. o. female with medical history of HTN, DM2, AV stenosis who presented to the [place] [address] after a fall onto her back, noted becoming dizzy in bathroom and falling. Denies LOC or head injury.  She had L sided back pain immediately after and since then. Upon ER evaluation, CXR with small left apical pneumothorax and small left pleural effusion with multiple rib fractures. Incidentally, her Hgb was found to be 7.0.  Hemoccult positive. Patient was admitted to [facility] for hospitalization. Her Hgb remained stable at 7.0. Repeat CXR showed slightly larger pneumothorax with tracheal shift and rib fractures. Pulmonology reviewed the films and requested transfer to . Considered chest tube but decided against it due to the amount of pain it would cause with rib fractures. Pulmonology continues to follow the case.     PT/OT with STR recs. CM assisting. Noted cannot take PPD, will use CXR, no evidence of TB. Subjective/24hr Events (22): Patient alert, oriented x 3, [place]. She is sitting in chair at bedside. She appears uncomfortable with movement. Denies chest pain, SOB, abdominal pain. Endorses rib pain with movement.     Assessment & Plan:     Pneumothorax, traumatic, resolved  -Pulmonology following  -No chest tube for now  -O2 99% on RA  -Scheduled Roxicodone for pain per pulm, follow closely in this elderly female, pain still remains uncontrolled, pulm to inc today  -CXR shows resolution   -Encourage IS and ambulation as tolerated    GI bleed, ruled out per GI 6/3, no endoscopy   -[facility]    Acute blood loss anemia (ABLA)  -Likely secondary to fall-related bruising  -Hold home NSAID's, continue PPI  -s/p 1 U PRBC 6/4 for Hgb 6.3  -Cont PO iron, labs show deficiency  -H&H stable 7.9/25.3    Aortic valve stenosis  -Noted    Type 2 diabetes mellitus (HCC)  -Cont SSI Humalog ACHS, Lantus 5 units, carb-controlled diet    Hypertension  -Cont Toprol, amlodipine  -Dec amlodipine, BP on lower side today, also with up-titration of pain medication, cont to monitor    Hypomagnesemia  -Replete IV, monitor am labs    Mild Hyponatremia  -Check urine labs / serum osmo / monitor am labs    Discharge Planning:  Pending STR placement     Diet:  ADULT DIET; Easy to Chew; 4 carb choices (60 gm/meal)  DVT PPx: SCD's  Code status: Full Code    Hospital Problems:  Principal Problem:    Pneumothorax, traumatic  Active Problems:    GI bleed    Traumatic pneumothorax    Acute blood loss anemia (ABLA)    Aortic valve stenosis    Type 2 diabetes mellitus (HCC)    Hypertension  Resolved Problems:    * No resolved hospital problems. *      Objective:     Patient Vitals for the past 24 hrs:   Temp Pulse Resp BP SpO2   06/07/22 1513 98.1 °F (36.7 °C) (!) 101 20 (!) 121/59 91 %   06/07/22 1118 98.1 °F (36.7 °C) (!) 101 17 (!) 126/46 91 %   06/07/22 0726 98.1 °F (36.7 °C) (!) 103 19 (!) 164/65 100 %   06/07/22 0340 -- (!) 107 -- -- --   06/07/22 0339 98.4 °F (36.9 °C) (!) 131 -- (!) 171/85 98 %   06/07/22 0320 -- -- 18 -- 97 %   06/06/22 2245 -- 94 -- -- --   06/06/22 2226 97.2 °F (36.2 °C) (!) 123 19 (!) 172/70 97 %   06/06/22 1941 97.5 °F (36.4 °C) (!) 101 18 (!) 165/79 100 %       Oxygen Therapy  SpO2: 91 %  O2 Device: Nasal cannula  Skin Assessment: Clean, dry, & intact  O2 Flow Rate (L/min): 2 L/min (86% on room air; > 90% on 2L)    Estimated body mass index is 17.85 kg/m² as calculated from the following:    Height as of this encounter: 5' 4\" (1.626 m). Weight as of this encounter: 104 lb (47.2 kg).   No intake or output data in the 24 hours ending 06/07/22 1704      Physical Exam:   Blood pressure (!) 121/59, pulse (!) 101, temperature 98.1 °F (36.7 °C), temperature source Oral, resp. rate 20, height 5' 4\" (1.626 m), weight 104 lb (47.2 kg), SpO2 91 %. General:    Frail, [place], alert, visibly uncomfortable with movement. Head:  Normocephalic, atraumatic  Eyes:  Sclerae appear normal.  Pupils equally round. ENT:  Nares appear normal, no drainage. Moist oral mucosa  Neck:  No restricted ROM. Trachea midline   CV:   Tachycardic. + murmur. No jugular venous distension. Lungs:   CTAB. Shallow inspiration. No wheezing, rhonchi, or rales. Respirations even, unlabored  Abdomen: Bowel sounds present. Soft, nontender, nondistended. Extremities: No cyanosis or clubbing. No edema  Skin:     No rashes and normal coloration. Warm and dry. Scattered bruising   Neuro:  CN II-XII grossly intact. Sensation intact. A&Ox3. Moves all extremities. No focal deficits. Psych:  Normal mood and affect.       I have reviewed ordered lab tests and independently visualized imaging below:    Recent Labs:  Recent Results (from the past 48 hour(s))   POCT Glucose    Collection Time: 06/05/22  9:18 PM   Result Value Ref Range    POC Glucose 171 (H) 65 - 100 mg/dL    Performed by: Stephany    POCT Glucose    Collection Time: 06/06/22  7:52 AM   Result Value Ref Range    POC Glucose 146 (H) 65 - 100 mg/dL    Performed by: LiaCT    POCT Glucose    Collection Time: 06/06/22 11:28 AM   Result Value Ref Range    POC Glucose 177 (H) 65 - 100 mg/dL    Performed by: MirellaaPCT    POCT Glucose    Collection Time: 06/06/22  4:06 PM   Result Value Ref Range    POC Glucose 238 (H) 65 - 100 mg/dL    Performed by: Марина    POCT Glucose    Collection Time: 06/06/22  8:33 PM   Result Value Ref Range    POC Glucose 199 (H) 65 - 100 mg/dL    Performed by: Juan Diego    Basic Metabolic Panel    Collection Time: 06/07/22  4:43 AM   Result Value Ref Range    Sodium 132 (L) 136 - 145 mmol/L    Potassium 3.8 3.5 - 5.1 mmol/L    Chloride 96 (L) 98 - 107 mmol/L    CO2 24 21 - 32 mmol/L    Anion Gap 12 7 - 16 mmol/L    Glucose 108 (H) 65 - 100 mg/dL    BUN 19 8 - 23 MG/DL    CREATININE 0.80 0.6 - 1.0 MG/DL    GFR African American >60 >60 ml/min/1.73m2    GFR Non- >60 >60 ml/min/1.73m2    Calcium 8.7 8.3 - 10.4 MG/DL   Magnesium    Collection Time: 06/07/22  4:43 AM   Result Value Ref Range    Magnesium 1.7 (L) 1.8 - 2.4 mg/dL   Hemoglobin and Hematocrit    Collection Time: 06/07/22  4:43 AM   Result Value Ref Range    Hemoglobin 7.9 (L) 11.7 - 15.4 g/dL    Hematocrit 25.3 (L) 35.8 - 46.3 %   POCT Glucose    Collection Time: 06/07/22  7:48 AM   Result Value Ref Range    POC Glucose 113 (H) 65 - 100 mg/dL    Performed by: Марина    POCT Glucose    Collection Time: 06/07/22 11:19 AM   Result Value Ref Range    POC Glucose 170 (H) 65 - 100 mg/dL    Performed by: Millie    POCT Glucose    Collection Time: 06/07/22  4:38 PM   Result Value Ref Range    POC Glucose 208 (H) 65 - 100 mg/dL    Performed by: Марина        Other Studies:  XR CHEST 1 VIEW    Result Date: 6/6/2022  EXAM: Chest x-ray. INDICATION: Dyspnea. COMPARISON: Yesterday's chest x-ray. TECHNIQUE: Frontal view chest x-ray. FINDINGS: There is a decreased tiny left apical pneumothorax. Left lung base atelectasis or contusion and a small left pleural effusion are unchanged. There is a new small right pleural effusion. Right lung remains clear. The heart size is normal. There are left-sided rib fractures. 1. Decreased tiny left apical pneumothorax. 2. Unchanged left lung base atelectasis or contusion. 3. Small bilateral pleural effusions, unchanged on the left and new on the right. XR CHEST 1 VIEW    Result Date: 6/5/2022  EXAM: Chest x-ray. INDICATION: Follow-up pneumothorax. COMPARISON: Yesterday's chest x-ray. TECHNIQUE: Frontal view chest x-ray.  FINDINGS: There is a slightly enlarged small left apical pneumothorax, in this patient with multiple left rib fractures. Left lung base atelectasis or contusion and a small left pleural effusion are unchanged. Right lung and pleural space remain clear. The heart size is normal.     1. Slightly enlarged small left apical pneumothorax, in this patient with multiple left rib fractures. 2. Unchanged left lung base atelectasis or contusion and small left pleural effusion.       Current Meds:  Current Facility-Administered Medications   Medication Dose Route Frequency    oxyCODONE (ROXICODONE) immediate release tablet 10 mg  10 mg Oral Q6H    docusate sodium (COLACE) capsule 100 mg  100 mg Oral Daily    oxyCODONE (ROXICODONE) immediate release tablet 5 mg  5 mg Oral Q4H PRN    amLODIPine (NORVASC) tablet 10 mg  10 mg Oral Daily    ferrous sulfate (IRON 325) tablet 325 mg  325 mg Oral Daily with breakfast    insulin glargine (LANTUS) injection vial 5 Units  5 Units SubCUTAneous Nightly    metoprolol succinate (TOPROL XL) extended release tablet 50 mg  50 mg Oral Daily    pantoprazole (PROTONIX) tablet 40 mg  40 mg Oral QAM AC    0.9 % sodium chloride infusion   IntraVENous PRN    acetaminophen (TYLENOL) tablet 650 mg  650 mg Oral Q6H PRN    Or    acetaminophen (TYLENOL) suppository 650 mg  650 mg Rectal Q6H PRN    ondansetron (ZOFRAN-ODT) disintegrating tablet 4 mg  4 mg Oral Q8H PRN    Or    ondansetron (ZOFRAN) injection 4 mg  4 mg IntraVENous Q6H PRN    polyethylene glycol (GLYCOLAX) packet 17 g  17 g Oral Daily PRN    albuterol sulfate  (90 Base) MCG/ACT inhaler 2 puff  2 puff Inhalation Q4H PRN    Vitamin D (CHOLECALCIFEROL) tablet 5,000 Units  5,000 Units Oral Daily    insulin lispro (HUMALOG) injection vial 0-8 Units  0-8 Units SubCUTAneous TID WC    insulin lispro (HUMALOG) injection vial 0-4 Units  0-4 Units SubCUTAneous Nightly    glucose chewable tablet 16 g  4 tablet Oral PRN    dextrose bolus 10% 125 mL  125 mL IntraVENous PRN    Or    dextrose bolus 10% 250 mL  250 mL IntraVENous PRN    glucagon injection 1 mg  1 mg IntraMUSCular PRN    dextrose 5 % solution  100 mL/hr IntraVENous PRN     Labs, vital signs, diagnostic imaging reviewed. Case discussed with patient, care team, Dr. Whitney Camacho.   Signed: Aminah Hardy AGACNP-BC

## 2022-06-07 NOTE — PLAN OF CARE
Increased confusion noted today other neuro checks wnl new iv acess gained LW 24g sitting in bedside chair monitoring as ordered chair alarm in place and on

## 2022-06-07 NOTE — PROGRESS NOTES
PHYSICAL THERAPY Daily Note and AM  (Link to Caseload Tracking: PT Visit Days : 2  Time In/Out PT Charge Capture  Rehab Caseload Tracker  Orders      America Perez is a 80 y.o. female   PRIMARY DIAGNOSIS: Pneumothorax, traumatic  Traumatic pneumothorax, initial encounter [S27. 0XXA]       Inpatient: Payor: Bailey Sor / Plan: Phan Reaves PPO / Product Type: Medicare /     ASSESSMENT:     REHAB RECOMMENDATIONS:   Recommendation to date pending progress:  Setting:   Short-term Rehab    Equipment:     To Be Determined     ASSESSMENT:  Ms. Wale Reyna  Presents supine and willing to participate. She is having some confusion today which is evident spending a little time with her. She performed supine exercises below with a need to keep her on track. She sat up with mod A and poor sitting balance needing to hold onto railings and the side of the bed to maintain sitting balance. She was able to stand with mostly CGA and take a few shuffled steps to the chair with difficulty. No real progress and will certainly need a rehab stay.      SUBJECTIVE:   Ms. Wale Reyna states, \"do whatever you need to\"     Social/Functional Lives With: Family  Type of Home: House  Home Layout: Two level,Bed/Bath upstairs  Home Access: Level entry  Bathroom Shower/Tub: Walk-in shower  Bathroom Toilet: Handicap height  Bathroom Equipment: Shower chair,Hand-held shower  Bathroom Accessibility: Accessible  Home Equipment: Rollator,Grab bars  Has the patient had two or more falls in the past year or any fall with injury in the past year?: No  Receives Help From: Family  ADL Assistance: [address]: Independent  Homemaking Responsibilities: Yes  Laundry Responsibility: Primary  Cleaning Responsibility: Primary  Bill Paying/Finance Responsibility: Primary  Shopping Responsibility: Primary  Dependent Care Responsibility: Primary  Health Care Management: Primary  Ambulation Assistance: Independent  Transfer Assistance: Independent  Active : No  Patient's  Info: [place] - daughter  Mode of Transportation: Car  Occupation: Retired  OBJECTIVE:     PAIN: VITALS / O2: PRECAUTION / Mir Goins / Juan Fernandez:   Pre Treatment:          Post Treatment:none mentioned Vitals        Oxygen    None    RESTRICTIONS/PRECAUTIONS:        MOBILITY: I Mod I S SBA CGA Min Mod Max Total  NT x2 Comments:   Bed Mobility    Rolling [] [] [] [] [] [] [] [] [] [] []    Supine to Sit [] [] [] [] [] [] [x] [] [] [] []    Scooting [] [] [] [] [] [] [x] [] [] [] []    Sit to Supine [] [] [] [] [] [] [] [] [] [] []    Transfers    Sit to Stand [] [] [] [] [x] [x] [] [] [] [] []    Bed to Chair [] [] [] [] [] [] [x] [] [] [] []    Stand to Sit [] [] [] [] [] [x] [] [] [] [] []     [] [] [] [] [] [] [] [] [] [] []    I=Independent, Mod I=Modified Independent, S=Supervision, SBA=Standby Assistance, CGA=Contact Guard Assistance,   Min=Minimal Assistance, Mod=Moderate Assistance, Max=Maximal Assistance, Total=Total Assistance, NT=Not Tested    BALANCE: Good Fair+ Fair Fair- Poor NT Comments   Sitting Static [] [] [] [x] [x] []    Sitting Dynamic [] [] [] [x] [x] []              Standing Static [] [] [x] [] [] []    Standing Dynamic [] [] [] [x] [] []      GAIT: I Mod I S SBA CGA Min Mod Max Total  NT x2 Comments:   Level of Assistance [] [] [] [] [] [x] [x] [] [] [] [x]    Distance 2 feet    DME Rolling Walker    Gait Quality very slow and shuffled    Weightbearing Status      Stairs      I=Independent, Mod I=Modified Independent, S=Supervision, SBA=Standby Assistance, CGA=Contact Guard Assistance,   Min=Minimal Assistance, Mod=Moderate Assistance, Max=Maximal Assistance, Total=Total Assistance, NT=Not Tested    PLAN:   ACUTE PHYSICAL THERAPY GOALS:   (Developed with and agreed upon by patient and/or caregiver. )  LTG:  (1.)Ms. Banerjee Overall will move from supine to sit and sit to supine , scoot up and down and roll side to side in bed with MINIMAL ASSIST within 7 treatment day(s). (2.)Ms. Lala Rao will transfer from bed to chair and chair to bed with MINIMAL ASSIST using the least restrictive device within 7 treatment day(s). (3.)Ms. Lala Rao will ambulate with MINIMAL ASSIST for 50 feet with the least restrictive device within 7 treatment day(s). (4.)Ms. Lala Rao will tolerate at least 23 min of dynamic standing activity to assist standing ADLs with the least restrictive device within 7 treatment days. (5.)Ms. Lala Rao will climb at least 16 steps with MODERATE ASSIST with the least restrictive device within 7 treatment days. FREQUENCY AND DURATION: 3 times/week for duration of hospital stay or until stated goals are met, whichever comes first.    TREATMENT:   TREATMENT:   Therapeutic Activity (15 Minutes): Therapeutic activity included Supine to Sit, Scooting, Transfer Training, Ambulation on level ground, Sitting balance  and Standing balance to improve functional Activity tolerance, Mobility and Strength. Therapeutic Exercise (10 Minutes): Therapeutic exercises noted below to improve functional activity tolerance, AROM, strength and mobility.      TREATMENT GRID:   Date:  6/7/22 Date:   Date:     Activity/Exercise Parameters Parameters Parameters   Supine heel slides 5x B     Supine SAQ 5x B                                         AFTER TREATMENT PRECAUTIONS: Alarm Activated, Bed/Chair Locked, Call light within reach, Chair and RN at bedside    INTERDISCIPLINARY COLLABORATION:  RN/ PCT and PT/ PTA    EDUCATION:      TIME IN/OUT:  Time In: 0945  Time Out: 1010  Minutes: 25    Lor Meyers PTA

## 2022-06-07 NOTE — PROGRESS NOTES
Pt is resting quietly in bed. Respirations even and unlabored on 2L NC. No signs of distress noted at this time. Call light within reach. Will continue to monitor.

## 2022-06-08 ENCOUNTER — APPOINTMENT (OUTPATIENT)
Dept: GENERAL RADIOLOGY | Age: 87
DRG: 200 | End: 2022-06-08
Attending: FAMILY MEDICINE
Payer: MEDICARE

## 2022-06-08 PROBLEM — E87.1 HYPONATREMIA: Status: ACTIVE | Noted: 2022-06-07

## 2022-06-08 PROBLEM — R33.8 ACUTE URINARY RETENTION: Status: ACTIVE | Noted: 2022-06-08

## 2022-06-08 PROBLEM — J69.0 ASPIRATION PNEUMONIA (HCC): Status: ACTIVE | Noted: 2022-06-08

## 2022-06-08 PROBLEM — E83.42 HYPOMAGNESEMIA: Status: ACTIVE | Noted: 2022-06-07

## 2022-06-08 LAB
ALBUMIN SERPL-MCNC: 2.5 G/DL (ref 3.2–4.6)
ALBUMIN/GLOB SERPL: 0.7 {RATIO} (ref 1.2–3.5)
ALP SERPL-CCNC: 66 U/L (ref 50–136)
ALT SERPL-CCNC: 24 U/L (ref 12–65)
AMORPH CRY URNS QL MICRO: ABNORMAL
ANION GAP SERPL CALC-SCNC: 9 MMOL/L (ref 7–16)
APPEARANCE UR: CLEAR
AST SERPL-CCNC: 31 U/L (ref 15–37)
BACTERIA URNS QL MICRO: ABNORMAL /HPF
BASOPHILS # BLD: 0 K/UL (ref 0–0.2)
BASOPHILS NFR BLD: 0 % (ref 0–2)
BILIRUB DIRECT SERPL-MCNC: 0.1 MG/DL
BILIRUB SERPL-MCNC: 0.5 MG/DL (ref 0.2–1.1)
BILIRUB UR QL: NEGATIVE
BUN SERPL-MCNC: 26 MG/DL (ref 8–23)
CALCIUM SERPL-MCNC: 9.1 MG/DL (ref 8.3–10.4)
CHLORIDE SERPL-SCNC: 95 MMOL/L (ref 98–107)
CO2 SERPL-SCNC: 25 MMOL/L (ref 21–32)
COLOR UR: YELLOW
CREAT SERPL-MCNC: 1 MG/DL (ref 0.6–1)
CREAT UR-MCNC: 131 MG/DL
CRYSTALS URNS QL MICRO: ABNORMAL /LPF
DIFFERENTIAL METHOD BLD: ABNORMAL
EOSINOPHIL # BLD: 0.2 K/UL (ref 0–0.8)
EOSINOPHIL NFR BLD: 2 % (ref 0.5–7.8)
EPI CELLS #/AREA URNS HPF: ABNORMAL /HPF
ERYTHROCYTE [DISTWIDTH] IN BLOOD BY AUTOMATED COUNT: 16.1 % (ref 11.9–14.6)
GLOBULIN SER CALC-MCNC: 3.7 G/DL (ref 2.3–3.5)
GLUCOSE BLD STRIP.AUTO-MCNC: 152 MG/DL (ref 65–100)
GLUCOSE BLD STRIP.AUTO-MCNC: 207 MG/DL (ref 65–100)
GLUCOSE BLD STRIP.AUTO-MCNC: 208 MG/DL (ref 65–100)
GLUCOSE BLD STRIP.AUTO-MCNC: 209 MG/DL (ref 65–100)
GLUCOSE SERPL-MCNC: 139 MG/DL (ref 65–100)
GLUCOSE UR STRIP.AUTO-MCNC: 500 MG/DL
HCT VFR BLD AUTO: 23.4 % (ref 35.8–46.3)
HGB BLD-MCNC: 7.5 G/DL (ref 11.7–15.4)
HGB FREE PLAS-MCNC: 11.8 MG/DL (ref 0–4.9)
HGB FREE PLAS-MCNC: 2.7 MG/DL (ref 0–4.9)
HGB FREE PLAS-MCNC: 5.3 MG/DL (ref 0–4.9)
HGB UR QL STRIP: NEGATIVE
IMM GRANULOCYTES # BLD AUTO: 0.1 K/UL (ref 0–0.5)
IMM GRANULOCYTES NFR BLD AUTO: 1 % (ref 0–5)
KETONES UR QL STRIP.AUTO: ABNORMAL MG/DL
LEUKOCYTE ESTERASE UR QL STRIP.AUTO: NEGATIVE
LYMPHOCYTES # BLD: 1.7 K/UL (ref 0.5–4.6)
LYMPHOCYTES NFR BLD: 13 % (ref 13–44)
MAGNESIUM SERPL-MCNC: 2.3 MG/DL (ref 1.8–2.4)
MCH RBC QN AUTO: 25.1 PG (ref 26.1–32.9)
MCHC RBC AUTO-ENTMCNC: 32.1 G/DL (ref 31.4–35)
MCV RBC AUTO: 78.3 FL (ref 79.6–97.8)
MONOCYTES # BLD: 1.2 K/UL (ref 0.1–1.3)
MONOCYTES NFR BLD: 10 % (ref 4–12)
NEUTS SEG # BLD: 9.1 K/UL (ref 1.7–8.2)
NEUTS SEG NFR BLD: 74 % (ref 43–78)
NITRITE UR QL STRIP.AUTO: NEGATIVE
NRBC # BLD: 0 K/UL (ref 0–0.2)
NT PRO BNP: 1374 PG/ML
OSMOLALITY SERPL: 270 MOSM/KG H2O (ref 280–301)
OSMOLALITY UR: 498 MOSM/KG H2O (ref 50–1400)
PH UR STRIP: 6 [PH] (ref 5–9)
PLATELET # BLD AUTO: 313 K/UL (ref 150–450)
PMV BLD AUTO: 10.4 FL (ref 9.4–12.3)
POTASSIUM SERPL-SCNC: 3.9 MMOL/L (ref 3.5–5.1)
PROT SERPL-MCNC: 6.2 G/DL (ref 6.3–8.2)
PROT UR STRIP-MCNC: 30 MG/DL
RBC # BLD AUTO: 2.99 M/UL (ref 4.05–5.2)
SERVICE CMNT-IMP: ABNORMAL
SODIUM SERPL-SCNC: 129 MMOL/L (ref 136–145)
SODIUM UR-SCNC: 12 MMOL/L
SP GR UR REFRACTOMETRY: >1.03 (ref 1–1.02)
TSH, 3RD GENERATION: 1.11 UIU/ML (ref 0.36–3.74)
URATE SERPL-MCNC: 5.6 MG/DL (ref 2.6–6)
UROBILINOGEN UR QL STRIP.AUTO: 0.2 EU/DL (ref 0.2–1)
WBC # BLD AUTO: 12.3 K/UL (ref 4.3–11.1)
WBC URNS QL MICRO: ABNORMAL /HPF

## 2022-06-08 PROCEDURE — 87086 URINE CULTURE/COLONY COUNT: CPT

## 2022-06-08 PROCEDURE — 85025 COMPLETE CBC W/AUTO DIFF WBC: CPT

## 2022-06-08 PROCEDURE — 6370000000 HC RX 637 (ALT 250 FOR IP): Performed by: STUDENT IN AN ORGANIZED HEALTH CARE EDUCATION/TRAINING PROGRAM

## 2022-06-08 PROCEDURE — 82962 GLUCOSE BLOOD TEST: CPT

## 2022-06-08 PROCEDURE — 51798 US URINE CAPACITY MEASURE: CPT

## 2022-06-08 PROCEDURE — 6370000000 HC RX 637 (ALT 250 FOR IP): Performed by: NURSE PRACTITIONER

## 2022-06-08 PROCEDURE — 71045 X-RAY EXAM CHEST 1 VIEW: CPT

## 2022-06-08 PROCEDURE — 84300 ASSAY OF URINE SODIUM: CPT

## 2022-06-08 PROCEDURE — 2700000000 HC OXYGEN THERAPY PER DAY

## 2022-06-08 PROCEDURE — 84550 ASSAY OF BLOOD/URIC ACID: CPT

## 2022-06-08 PROCEDURE — 80076 HEPATIC FUNCTION PANEL: CPT

## 2022-06-08 PROCEDURE — 36415 COLL VENOUS BLD VENIPUNCTURE: CPT

## 2022-06-08 PROCEDURE — 80048 BASIC METABOLIC PNL TOTAL CA: CPT

## 2022-06-08 PROCEDURE — 1100000000 HC RM PRIVATE

## 2022-06-08 PROCEDURE — 6370000000 HC RX 637 (ALT 250 FOR IP): Performed by: HOSPITALIST

## 2022-06-08 PROCEDURE — 81003 URINALYSIS AUTO W/O SCOPE: CPT

## 2022-06-08 PROCEDURE — 83735 ASSAY OF MAGNESIUM: CPT

## 2022-06-08 PROCEDURE — 82570 ASSAY OF URINE CREATININE: CPT

## 2022-06-08 PROCEDURE — 6370000000 HC RX 637 (ALT 250 FOR IP): Performed by: INTERNAL MEDICINE

## 2022-06-08 PROCEDURE — 83930 ASSAY OF BLOOD OSMOLALITY: CPT

## 2022-06-08 PROCEDURE — 6360000002 HC RX W HCPCS: Performed by: INTERNAL MEDICINE

## 2022-06-08 PROCEDURE — 2580000003 HC RX 258: Performed by: STUDENT IN AN ORGANIZED HEALTH CARE EDUCATION/TRAINING PROGRAM

## 2022-06-08 PROCEDURE — 83880 ASSAY OF NATRIURETIC PEPTIDE: CPT

## 2022-06-08 PROCEDURE — 84443 ASSAY THYROID STIM HORMONE: CPT

## 2022-06-08 PROCEDURE — 83935 ASSAY OF URINE OSMOLALITY: CPT

## 2022-06-08 PROCEDURE — 2580000003 HC RX 258: Performed by: INTERNAL MEDICINE

## 2022-06-08 PROCEDURE — 51702 INSERT TEMP BLADDER CATH: CPT

## 2022-06-08 RX ORDER — NALOXONE HYDROCHLORIDE 0.4 MG/ML
0.4 INJECTION, SOLUTION INTRAMUSCULAR; INTRAVENOUS; SUBCUTANEOUS PRN
Status: DISCONTINUED | OUTPATIENT
Start: 2022-06-08 | End: 2022-06-13 | Stop reason: HOSPADM

## 2022-06-08 RX ORDER — SODIUM CHLORIDE, SODIUM LACTATE, POTASSIUM CHLORIDE, CALCIUM CHLORIDE 600; 310; 30; 20 MG/100ML; MG/100ML; MG/100ML; MG/100ML
INJECTION, SOLUTION INTRAVENOUS CONTINUOUS
Status: DISCONTINUED | OUTPATIENT
Start: 2022-06-08 | End: 2022-06-08

## 2022-06-08 RX ORDER — SODIUM CHLORIDE 9 MG/ML
INJECTION, SOLUTION INTRAVENOUS CONTINUOUS
Status: DISCONTINUED | OUTPATIENT
Start: 2022-06-08 | End: 2022-06-08

## 2022-06-08 RX ORDER — METHYLPREDNISOLONE SODIUM SUCCINATE 125 MG/2ML
40 INJECTION, POWDER, LYOPHILIZED, FOR SOLUTION INTRAMUSCULAR; INTRAVENOUS EVERY 8 HOURS
Status: DISCONTINUED | OUTPATIENT
Start: 2022-06-08 | End: 2022-06-09

## 2022-06-08 RX ORDER — OXYCODONE HYDROCHLORIDE 5 MG/1
5 TABLET ORAL EVERY 8 HOURS PRN
Status: DISCONTINUED | OUTPATIENT
Start: 2022-06-08 | End: 2022-06-13 | Stop reason: HOSPADM

## 2022-06-08 RX ORDER — SODIUM CHLORIDE 9 MG/ML
INJECTION, SOLUTION INTRAVENOUS CONTINUOUS
Status: DISCONTINUED | OUTPATIENT
Start: 2022-06-08 | End: 2022-06-13

## 2022-06-08 RX ADMIN — OXYCODONE 10 MG: 5 TABLET ORAL at 05:47

## 2022-06-08 RX ADMIN — NALOXONE HYDROCHLORIDE 0.4 MG: 0.4 INJECTION, SOLUTION INTRAMUSCULAR; INTRAVENOUS; SUBCUTANEOUS at 19:31

## 2022-06-08 RX ADMIN — INSULIN LISPRO 2 UNITS: 100 INJECTION, SOLUTION INTRAVENOUS; SUBCUTANEOUS at 12:50

## 2022-06-08 RX ADMIN — INSULIN GLARGINE 5 UNITS: 100 INJECTION, SOLUTION SUBCUTANEOUS at 21:24

## 2022-06-08 RX ADMIN — INSULIN LISPRO 2 UNITS: 100 INJECTION, SOLUTION INTRAVENOUS; SUBCUTANEOUS at 16:55

## 2022-06-08 RX ADMIN — AZITHROMYCIN MONOHYDRATE 500 MG: 500 INJECTION, POWDER, LYOPHILIZED, FOR SOLUTION INTRAVENOUS at 22:57

## 2022-06-08 RX ADMIN — AMLODIPINE BESYLATE 5 MG: 5 TABLET ORAL at 09:30

## 2022-06-08 RX ADMIN — FERROUS SULFATE TAB 325 MG (65 MG ELEMENTAL FE) 325 MG: 325 (65 FE) TAB at 09:31

## 2022-06-08 RX ADMIN — DOCUSATE SODIUM 100 MG: 100 CAPSULE, LIQUID FILLED ORAL at 09:30

## 2022-06-08 RX ADMIN — METOPROLOL SUCCINATE 50 MG: 50 TABLET, EXTENDED RELEASE ORAL at 09:31

## 2022-06-08 RX ADMIN — PANTOPRAZOLE SODIUM 40 MG: 40 TABLET, DELAYED RELEASE ORAL at 06:33

## 2022-06-08 RX ADMIN — CEFEPIME HYDROCHLORIDE 2000 MG: 2 INJECTION, POWDER, FOR SOLUTION INTRAVENOUS at 20:24

## 2022-06-08 RX ADMIN — OXYCODONE 10 MG: 5 TABLET ORAL at 11:50

## 2022-06-08 RX ADMIN — VANCOMYCIN HYDROCHLORIDE 1000 MG: 1 INJECTION, POWDER, LYOPHILIZED, FOR SOLUTION INTRAVENOUS at 21:13

## 2022-06-08 RX ADMIN — SODIUM CHLORIDE: 9 INJECTION, SOLUTION INTRAVENOUS at 18:55

## 2022-06-08 RX ADMIN — METHYLPREDNISOLONE SODIUM SUCCINATE 40 MG: 125 INJECTION, POWDER, FOR SOLUTION INTRAMUSCULAR; INTRAVENOUS at 20:23

## 2022-06-08 RX ADMIN — OXYCODONE 10 MG: 5 TABLET ORAL at 18:02

## 2022-06-08 RX ADMIN — SODIUM CHLORIDE, POTASSIUM CHLORIDE, SODIUM LACTATE AND CALCIUM CHLORIDE: 600; 310; 30; 20 INJECTION, SOLUTION INTRAVENOUS at 16:56

## 2022-06-08 RX ADMIN — VITAMIN D, TAB 1000IU (100/BT) 5000 UNITS: 25 TAB at 09:31

## 2022-06-08 ASSESSMENT — PAIN SCALES - GENERAL
PAINLEVEL_OUTOF10: 5
PAINLEVEL_OUTOF10: 0
PAINLEVEL_OUTOF10: 6
PAINLEVEL_OUTOF10: 4
PAINLEVEL_OUTOF10: 0
PAINLEVEL_OUTOF10: 0
PAINLEVEL_OUTOF10: 6

## 2022-06-08 ASSESSMENT — PAIN DESCRIPTION - LOCATION
LOCATION: FLANK
LOCATION: GENERALIZED

## 2022-06-08 ASSESSMENT — PAIN DESCRIPTION - DESCRIPTORS: DESCRIPTORS: SORE

## 2022-06-08 ASSESSMENT — PAIN - FUNCTIONAL ASSESSMENT
PAIN_FUNCTIONAL_ASSESSMENT: ACTIVITIES ARE NOT PREVENTED
PAIN_FUNCTIONAL_ASSESSMENT: ACTIVITIES ARE NOT PREVENTED

## 2022-06-08 ASSESSMENT — PAIN DESCRIPTION - ORIENTATION
ORIENTATION: LEFT
ORIENTATION: OTHER (COMMENT)

## 2022-06-08 NOTE — CARE COORDINATION
MSN, CM:  Patient and daughter made new choices overnight. Referrals sent to [facility] and [address] [place] [address]. Will await on acceptance and insurance approval.  Case Management will continue to follow.

## 2022-06-08 NOTE — PROGRESS NOTES
[facility] Pharmacokinetic Monitoring Service - Vancomycin     Brooke Sen is a 80 y.o. female starting on vancomycin therapy for HAP. Pharmacy consulted by Dr. Oralia Moss for monitoring and adjustment. Target Concentration: Goal AUC/EMMA 400-600 mg*hr/L    Additional Antimicrobials: cefepime, azithromycin    Pertinent Laboratory Values: Wt Readings from Last 1 Encounters:   06/03/22 104 lb (47.2 kg)     Temp Readings from Last 1 Encounters:   06/08/22 98.6 °F (37 °C) (Oral)     Recent Labs     06/07/22  0443 06/08/22  0325   BUN 19 26*   CREATININE 0.80 1.00   WBC  --  12.3*     Estimated Creatinine Clearance: 29 mL/min (based on SCr of 1 mg/dL). No results found for: Augustine [place]    MRSA Nasal Swab: not ordered. Order placed by pharmacy. .    Plan:  Dosing recommendations based on Bayesian software  Start vancomycin 1000 mg x 1, followed by 750 mg q24h  Anticipated AUC of 504 and trough concentration of 16.1 at steady state  Renal labs as indicated   Vancomycin concentrations will be ordered as clinically appropriate   Pharmacy will continue to monitor patient and adjust therapy as indicated    Thank you for the consult,  Connie Oreilly, [facility]

## 2022-06-08 NOTE — PROGRESS NOTES
Hospitalist Progress Note   Admit Date:  6/3/2022  4:48 PM   Name:  Jordy Hearn   Age:  80 y.o. Sex:  female  :  1933   MRN:  [id number]   Room:  803/    Presenting Complaint: No chief complaint on file. Reason(s) for Admission: Traumatic pneumothorax, initial encounter [facility]S27. [facility] History:   80 y. o. female with medical history of HTN, DM2, AV stenosis who presented to the [place] [address] after a fall onto her back, noted becoming dizzy in bathroom and falling. Denies LOC or head injury.  She had L sided back pain immediately after and since then. Upon ER evaluation, CXR with small left apical pneumothorax and small left pleural effusion with multiple rib fractures. Incidentally, her Hgb was found to be 7.0.  Hemoccult positive. Patient was admitted to [facility] for hospitalization. Her Hgb remained stable at 7.0. Repeat CXR showed slightly larger pneumothorax with tracheal shift and rib fractures. Pulmonology reviewed the films and requested transfer to . Considered chest tube but decided against it due to the amount of pain it would cause with rib fractures. Pulmonology continues to follow the case.     PT/OT with STR recs. CM assisting. Noted cannot take PPD, will use CXR, no evidence of TB. Subjective/24hr Events (22): Patient alert, oriented x 3, [place]. She is supine in bed. Denies chest pain, SOB, abdominal pain. Endorses rib pain with movement. She appears more confused today regarding why she is in the hospital. No focal deficits.      Assessment & Plan:     Pneumothorax, traumatic, resolved  -Pulmonology following  -No chest tube   -Remains on 2L NC, wean as tolerated  -Scheduled Roxicodone for pain per pulm, follow closely in this elderly female  -CXR shows resolution   -Encourage IS and ambulation as tolerated    GI bleed, ruled out per GI 6/3, no endoscopy   -[facility]    Acute blood loss anemia (ABLA)  Iron Deficiency  -Likely secondary to fall-related bruising  -Hold home NSAID's, continue PPI  -s/p 1 U PRBC 6/4 for Hgb 6.3  -Cont PO iron  -H&H stable 7.5/23.4    Aortic valve stenosis  -Noted    Type 2 diabetes mellitus (HCC)  -Cont SSI Humalog ACHS, Lantus 5 units, carb-controlled diet    Hypertension  -Cont Toprol, amlodipine    Hypomagnesemia, resolved    Hyponatremia, worsening  -Urine labs were not evaluated as ordered, re-order today  -Na to 129 this am, appears volume depletion due to elevated urine spec gravity and elevated BUN  -Consult nephrology for assistance, workup/labs pending  -Will initiate IVF and monitor am labs    Urinary Retention  -Place [place]    Discharge Planning:  Pending STR placement     Diet:  ADULT DIET; Easy to Chew; 4 carb choices (60 gm/meal)  DVT PPx: SCD's  Code status: Full Code    Hospital Problems:  Principal Problem:    Pneumothorax, traumatic  Active Problems:    GI bleed    Traumatic pneumothorax    Acute blood loss anemia (ABLA)    Hypomagnesemia    Hyponatremia    Acute urinary retention    Leukocytosis    Aortic valve stenosis    Type 2 diabetes mellitus (HCC)    Hypertension  Resolved Problems:    * No resolved hospital problems.  *      Objective:     Patient Vitals for the past 24 hrs:   Temp Pulse Resp BP SpO2   06/08/22 1511 98.6 °F (37 °C) (!) 102 18 (!) 134/58 94 %   06/08/22 1220 -- -- 18 -- --   06/08/22 1117 98.8 °F (37.1 °C) (!) 104 18 (!) 157/66 95 %   06/08/22 0720 99 °F (37.2 °C) (!) 111 18 (!) 147/54 97 %   06/08/22 0617 -- -- 18 -- --   06/08/22 0301 98.4 °F (36.9 °C) 94 18 (!) 152/64 97 %   06/08/22 0045 -- 98 -- 137/66 --   06/08/22 0015 -- -- 18 -- --   06/07/22 2241 98.6 °F (37 °C) (!) 101 18 (!) 165/66 98 %   06/07/22 1939 98.6 °F (37 °C) 91 20 (!) 168/68 99 %       Oxygen Therapy  SpO2: 94 %  O2 Device: Nasal cannula  Skin Assessment: Clean, dry, & intact  O2 Flow Rate (L/min): 2 L/min    Estimated body mass index is 17.85 kg/m² as calculated from the following:    Height as of this encounter: 5' 4\" (1.626 m). Weight as of this encounter: 104 lb (47.2 kg). Intake/Output Summary (Last 24 hours) at 6/8/2022 1606  Last data filed at 6/8/2022 1451  Gross per 24 hour   Intake 120 ml   Output 840 ml   Net -720 ml         Physical Exam:   Blood pressure (!) 134/58, pulse (!) 102, temperature 98.6 °F (37 °C), temperature source Oral, resp. rate 18, height 5' 4\" (1.626 m), weight 104 lb (47.2 kg), SpO2 94 %. General:    Frail, [place], alert, no acute distress  Head:  Normocephalic, atraumatic  Eyes:  Sclerae appear normal.  Pupils equally round. ENT:  Nares appear normal, no drainage. Moist oral mucosa  Neck:  No restricted ROM. Trachea midline   CV:   Tachycardic. + murmur. No jugular venous distension. Lungs:   CTAB. Shallow inspiration. No wheezing, rhonchi, or rales. Respirations even, unlabored on 2L NC  Abdomen: Bowel sounds present. Soft, nontender, nondistended. Extremities: No cyanosis or clubbing. No edema  Skin:     No rashes and normal coloration. Warm and dry. Scattered bruising   Neuro:  CN II-XII grossly intact. Sensation intact. A&Ox3. Moves all extremities. No focal deficits. Psych:  Flat mood and affect.       I have reviewed ordered lab tests and independently visualized imaging below:    Recent Labs:  Recent Results (from the past 48 hour(s))   POCT Glucose    Collection Time: 06/06/22  8:33 PM   Result Value Ref Range    POC Glucose 199 (H) 65 - 100 mg/dL    Performed by: Juan Diego    Hemoglobin free, plasma    Collection Time: 06/07/22  4:43 AM   Result Value Ref Range    Hemoglobin, Free, Plasma 11.8 (H) 0.0 - 4.9 mg/dL   Basic Metabolic Panel    Collection Time: 06/07/22  4:43 AM   Result Value Ref Range    Sodium 132 (L) 136 - 145 mmol/L    Potassium 3.8 3.5 - 5.1 mmol/L    Chloride 96 (L) 98 - 107 mmol/L    CO2 24 21 - 32 mmol/L    Anion Gap 12 7 - 16 mmol/L    Glucose 108 (H) 65 - 100 mg/dL    BUN 19 8 - 23 MG/DL CREATININE 0.80 0.6 - 1.0 MG/DL    GFR African American >60 >60 ml/min/1.73m2    GFR Non- >60 >60 ml/min/1.73m2    Calcium 8.7 8.3 - 10.4 MG/DL   Magnesium    Collection Time: 06/07/22  4:43 AM   Result Value Ref Range    Magnesium 1.7 (L) 1.8 - 2.4 mg/dL   Hemoglobin and Hematocrit    Collection Time: 06/07/22  4:43 AM   Result Value Ref Range    Hemoglobin 7.9 (L) 11.7 - 15.4 g/dL    Hematocrit 25.3 (L) 35.8 - 46.3 %   POCT Glucose    Collection Time: 06/07/22  7:48 AM   Result Value Ref Range    POC Glucose 113 (H) 65 - 100 mg/dL    Performed by: Марина    POCT Glucose    Collection Time: 06/07/22 11:19 AM   Result Value Ref Range    POC Glucose 170 (H) 65 - 100 mg/dL    Performed by:  Millie    POCT Glucose    Collection Time: 06/07/22  4:38 PM   Result Value Ref Range    POC Glucose 208 (H) 65 - 100 mg/dL    Performed by: Марина    POCT Glucose    Collection Time: 06/07/22  8:35 PM   Result Value Ref Range    POC Glucose 169 (H) 65 - 100 mg/dL    Performed by: Patrick    Urinalysis    Collection Time: 06/08/22 12:03 AM   Result Value Ref Range    Color, UA YELLOW      Appearance CLEAR      Specific Gravity, UA >1.030 (H) 1.001 - 1.023    pH, Urine 6.0 5.0 - 9.0      Protein, UA 30 (A) NEG mg/dL    Glucose,  mg/dL    Ketones, Urine TRACE (A) NEG mg/dL    Bilirubin Urine Negative NEG      Blood, Urine Negative NEG      Urobilinogen, Urine 0.2 0.2 - 1.0 EU/dL    Nitrite, Urine Negative NEG      Leukocyte Esterase, Urine Negative NEG      WBC, UA 5-10 0 /hpf    Epithelial Cells UA 0-3 0 /hpf    BACTERIA, URINE 4+ (H) 0 /hpf    Crystals CA OXALATE 0 /LPF    AMORPHOUS CRYSTAL 1+ (H) 0   Osmolality    Collection Time: 06/08/22  3:25 AM   Result Value Ref Range    Serum Osmolality 270 (L) 280 - 301 MOSM/kg W6N   Basic Metabolic Panel    Collection Time: 06/08/22  3:25 AM   Result Value Ref Range    Sodium 129 (L) 136 - 145 mmol/L    Potassium 3.9 3.5 - 5.1 mmol/L Chloride 95 (L) 98 - 107 mmol/L    CO2 25 21 - 32 mmol/L    Anion Gap 9 7 - 16 mmol/L    Glucose 139 (H) 65 - 100 mg/dL    BUN 26 (H) 8 - 23 MG/DL    CREATININE 1.00 0.6 - 1.0 MG/DL    GFR African American >60 >60 ml/min/1.73m2    GFR Non- 56 (L) >60 ml/min/1.73m2    Calcium 9.1 8.3 - 10.4 MG/DL   CBC with Auto Differential    Collection Time: 06/08/22  3:25 AM   Result Value Ref Range    WBC 12.3 (H) 4.3 - 11.1 K/uL    RBC 2.99 (L) 4.05 - 5.2 M/uL    Hemoglobin 7.5 (L) 11.7 - 15.4 g/dL    Hematocrit 23.4 (L) 35.8 - 46.3 %    MCV 78.3 (L) 79.6 - 97.8 FL    MCH 25.1 (L) 26.1 - 32.9 PG    MCHC 32.1 31.4 - 35.0 g/dL    RDW 16.1 (H) 11.9 - 14.6 %    Platelets 929 478 - 462 K/uL    MPV 10.4 9.4 - 12.3 FL    nRBC 0.00 0.0 - 0.2 K/uL    Differential Type AUTOMATED      Seg Neutrophils 74 43 - 78 %    Lymphocytes 13 13 - 44 %    Monocytes 10 4.0 - 12.0 %    Eosinophils % 2 0.5 - 7.8 %    Basophils 0 0.0 - 2.0 %    Immature Granulocytes 1 0.0 - 5.0 %    Segs Absolute 9.1 (H) 1.7 - 8.2 K/UL    Absolute Lymph # 1.7 0.5 - 4.6 K/UL    Absolute Mono # 1.2 0.1 - 1.3 K/UL    Absolute Eos # 0.2 0.0 - 0.8 K/UL    Basophils Absolute 0.0 0.0 - 0.2 K/UL    Absolute Immature Granulocyte 0.1 0.0 - 0.5 K/UL   Hepatic Function Panel    Collection Time: 06/08/22  3:25 AM   Result Value Ref Range    Total Protein 6.2 (L) 6.3 - 8.2 g/dL    Albumin 2.5 (L) 3.2 - 4.6 g/dL    Globulin 3.7 (H) 2.3 - 3.5 g/dL    Albumin/Globulin Ratio 0.7 (L) 1.2 - 3.5      Total Bilirubin 0.5 0.2 - 1.1 MG/DL    Bilirubin, Direct 0.1 <0.4 MG/DL    Alk Phosphatase 66 50 - 136 U/L    AST 31 15 - 37 U/L    ALT 24 12 - 65 U/L   Magnesium    Collection Time: 06/08/22  3:25 AM   Result Value Ref Range    Magnesium 2.3 1.8 - 2.4 mg/dL   POCT Glucose    Collection Time: 06/08/22  7:22 AM   Result Value Ref Range    POC Glucose 152 (H) 65 - 100 mg/dL    Performed by:  Devi    POCT Glucose    Collection Time: 06/08/22 11:19 AM   Result Value Ref tablet 50 mg  50 mg Oral Daily    pantoprazole (PROTONIX) tablet 40 mg  40 mg Oral QAM AC    0.9 % sodium chloride infusion   IntraVENous PRN    acetaminophen (TYLENOL) tablet 650 mg  650 mg Oral Q6H PRN    Or    acetaminophen (TYLENOL) suppository 650 mg  650 mg Rectal Q6H PRN    ondansetron (ZOFRAN-ODT) disintegrating tablet 4 mg  4 mg Oral Q8H PRN    Or    ondansetron (ZOFRAN) injection 4 mg  4 mg IntraVENous Q6H PRN    polyethylene glycol (GLYCOLAX) packet 17 g  17 g Oral Daily PRN    albuterol sulfate  (90 Base) MCG/ACT inhaler 2 puff  2 puff Inhalation Q4H PRN    Vitamin D (CHOLECALCIFEROL) tablet 5,000 Units  5,000 Units Oral Daily    insulin lispro (HUMALOG) injection vial 0-8 Units  0-8 Units SubCUTAneous TID WC    insulin lispro (HUMALOG) injection vial 0-4 Units  0-4 Units SubCUTAneous Nightly    glucose chewable tablet 16 g  4 tablet Oral PRN    dextrose bolus 10% 125 mL  125 mL IntraVENous PRN    Or    dextrose bolus 10% 250 mL  250 mL IntraVENous PRN    glucagon injection 1 mg  1 mg IntraMUSCular PRN    dextrose 5 % solution  100 mL/hr IntraVENous PRN     Labs, vital signs, diagnostic imaging reviewed. Case discussed with patient, care team, Dr. Thomas Faye.   Signed: Neema Gonzalez [facility]

## 2022-06-08 NOTE — CONSULTS
right rotator cuff    OTHER SURGICAL HISTORY      hemorrhoidectomy.  TUBAL LIGATION        Prior to Admission medications    Medication Sig Start Date End Date Taking?  Authorizing Provider   Multiple Vitamin (MULTIVITAMIN PO) Take 1 tablet by mouth daily    Ar Automatic Reconciliation   acetaminophen (TYLENOL) 325 MG tablet Take 650 mg by mouth every 8 hours 11/21/11   Ar Automatic Reconciliation   albuterol sulfate  (90 Base) MCG/ACT inhaler Inhale 2 puffs into the lungs every 4 hours as needed 5/6/11   Ar Automatic Reconciliation   amLODIPine (NORVASC) 5 MG tablet Take 5 mg by mouth daily 12/15/21   Ar Automatic Reconciliation   aspirin 81 MG chewable tablet Take 81 mg by mouth daily    Ar Automatic Reconciliation   vitamin D3 (CHOLECALCIFEROL) 125 MCG (5000 UT) TABS tablet Take 5,000 Units by mouth daily    Ar Automatic Reconciliation   Dulaglutide (TRULICITY) 5.15 XH/0.4HG SOPN Inject 0.75 mg into the skin every 7 days    Ar Automatic Reconciliation   predniSONE (DELTASONE) 10 MG tablet Take 10 mg by mouth every other day    Ar Automatic Reconciliation     Allergies   Allergen Reactions    Morphine And Related Nausea And Vomiting    Oxycodone Nausea And Vomiting    Penicillins Rash    Lidocaine Other (See Comments)     \"Paralysis\"    Statins Other (See Comments)     Severe leg pain    Hydrocodone-Acetaminophen Nausea And Vomiting    Meperidine Nausea And Vomiting      Social History     Tobacco Use    Smoking status: Never Smoker    Smokeless tobacco: Never Used   Substance Use Topics    Alcohol use: Yes      Family History   Problem Relation Age of Onset    Stroke Mother     Stroke Father           Review of Systems    As above      Objective:       BP (!) 157/66   Pulse (!) 104   Temp 98.8 °F (37.1 °C) (Oral)   Resp 18   Ht 5' 4\" (1.626 m)   Wt 104 lb (47.2 kg)   SpO2 95%   BMI 17.85 kg/m²     06/08 0701 - 06/08 1900  In: 120 [P.O.:120]  Out: -   No intake/output data recorded.     PE:  Alert  Not in YASMIN  Lung: poor inspiration  Cv: rr, S. M+, JVD-  Abd: soft, not tender  Ext: trace to + edema     Data Review:     Recent Results (from the past 24 hour(s))   POCT Glucose    Collection Time: 06/07/22  4:38 PM   Result Value Ref Range    POC Glucose 208 (H) 65 - 100 mg/dL    Performed by: Марина    POCT Glucose    Collection Time: 06/07/22  8:35 PM   Result Value Ref Range    POC Glucose 169 (H) 65 - 100 mg/dL    Performed by: Patrick    Urinalysis    Collection Time: 06/08/22 12:03 AM   Result Value Ref Range    Color, UA YELLOW      Appearance CLEAR      Specific Gravity, UA >1.030 (H) 1.001 - 1.023    pH, Urine 6.0 5.0 - 9.0      Protein, UA 30 (A) NEG mg/dL    Glucose,  mg/dL    Ketones, Urine TRACE (A) NEG mg/dL    Bilirubin Urine Negative NEG      Blood, Urine Negative NEG      Urobilinogen, Urine 0.2 0.2 - 1.0 EU/dL    Nitrite, Urine Negative NEG      Leukocyte Esterase, Urine Negative NEG      WBC, UA 5-10 0 /hpf    Epithelial Cells UA 0-3 0 /hpf    BACTERIA, URINE 4+ (H) 0 /hpf    Crystals CA OXALATE 0 /LPF    AMORPHOUS CRYSTAL 1+ (H) 0   Osmolality    Collection Time: 06/08/22  3:25 AM   Result Value Ref Range    Serum Osmolality 270 (L) 280 - 301 MOSM/kg J5H   Basic Metabolic Panel    Collection Time: 06/08/22  3:25 AM   Result Value Ref Range    Sodium 129 (L) 136 - 145 mmol/L    Potassium 3.9 3.5 - 5.1 mmol/L    Chloride 95 (L) 98 - 107 mmol/L    CO2 25 21 - 32 mmol/L    Anion Gap 9 7 - 16 mmol/L    Glucose 139 (H) 65 - 100 mg/dL    BUN 26 (H) 8 - 23 MG/DL    CREATININE 1.00 0.6 - 1.0 MG/DL    GFR African American >60 >60 ml/min/1.73m2    GFR Non- 56 (L) >60 ml/min/1.73m2    Calcium 9.1 8.3 - 10.4 MG/DL   CBC with Auto Differential    Collection Time: 06/08/22  3:25 AM   Result Value Ref Range    WBC 12.3 (H) 4.3 - 11.1 K/uL    RBC 2.99 (L) 4.05 - 5.2 M/uL    Hemoglobin 7.5 (L) 11.7 - 15.4 g/dL    Hematocrit 23.4 (L) 35.8 - 46.3 % MCV 78.3 (L) 79.6 - 97.8 FL    MCH 25.1 (L) 26.1 - 32.9 PG    MCHC 32.1 31.4 - 35.0 g/dL    RDW 16.1 (H) 11.9 - 14.6 %    Platelets 683 514 - 788 K/uL    MPV 10.4 9.4 - 12.3 FL    nRBC 0.00 0.0 - 0.2 K/uL    Differential Type AUTOMATED      Seg Neutrophils 74 43 - 78 %    Lymphocytes 13 13 - 44 %    Monocytes 10 4.0 - 12.0 %    Eosinophils % 2 0.5 - 7.8 %    Basophils 0 0.0 - 2.0 %    Immature Granulocytes 1 0.0 - 5.0 %    Segs Absolute 9.1 (H) 1.7 - 8.2 K/UL    Absolute Lymph # 1.7 0.5 - 4.6 K/UL    Absolute Mono # 1.2 0.1 - 1.3 K/UL    Absolute Eos # 0.2 0.0 - 0.8 K/UL    Basophils Absolute 0.0 0.0 - 0.2 K/UL    Absolute Immature Granulocyte 0.1 0.0 - 0.5 K/UL   Hepatic Function Panel    Collection Time: 06/08/22  3:25 AM   Result Value Ref Range    Total Protein 6.2 (L) 6.3 - 8.2 g/dL    Albumin 2.5 (L) 3.2 - 4.6 g/dL    Globulin 3.7 (H) 2.3 - 3.5 g/dL    Albumin/Globulin Ratio 0.7 (L) 1.2 - 3.5      Total Bilirubin 0.5 0.2 - 1.1 MG/DL    Bilirubin, Direct 0.1 <0.4 MG/DL    Alk Phosphatase 66 50 - 136 U/L    AST 31 15 - 37 U/L    ALT 24 12 - 65 U/L   Magnesium    Collection Time: 06/08/22  3:25 AM   Result Value Ref Range    Magnesium 2.3 1.8 - 2.4 mg/dL   POCT Glucose    Collection Time: 06/08/22  7:22 AM   Result Value Ref Range    POC Glucose 152 (H) 65 - 100 mg/dL    Performed by: LAURA Casillas 38            Assessment:     Principal Problem:    Pneumothorax, traumatic  Active Problems:    GI bleed    Traumatic pneumothorax    Acute blood loss anemia (ABLA)    Hypomagnesemia    Hyponatremia    Leukocytosis    Aortic valve stenosis    Type 2 diabetes mellitus ([facility] [address]    Hypertension  Resolved Problems:    * No resolved hospital problems. *      I/p :     Hypo osmolar hyponatremia: mild with S. Na down from 135 on 6/2 to 129 today on 6/8  Urine SG elevated and Bun/ creat higher than last 2 days: suggesting volume depletion.   Pt was on prednisone as out patient, off during this admission: check am cortisol   Check uric acid, BNP.  TSH   Urine NA, creat and osmolality     Thanks

## 2022-06-08 NOTE — PROGRESS NOTES
CROSS COVER NOTE:    Notified by RN that patient changed to NRB. Repeat CXR shows devloping opacity, likely aspiration pneumonia vs HCAP. Patient NPO now. Switch antibiotics to broaden coverage, vancomycin/cefepime/azithro. Will get STAT ABG and repeat Narcan again. May need to go to ICU, will update family.

## 2022-06-08 NOTE — PROGRESS NOTES
Pt resting quietly in bed. Respirations are even and unlabored on 2L NC. No signs of distress noted or needs stated at this time. Call light in reach. Will continue to monitor. SBAR to be given to oncoming nurse.

## 2022-06-09 ENCOUNTER — APPOINTMENT (OUTPATIENT)
Dept: GENERAL RADIOLOGY | Age: 87
DRG: 200 | End: 2022-06-09
Attending: FAMILY MEDICINE
Payer: MEDICARE

## 2022-06-09 LAB
ALBUMIN SERPL-MCNC: 2.2 G/DL (ref 3.2–4.6)
ALBUMIN/GLOB SERPL: 0.8 {RATIO} (ref 1.2–3.5)
ALP SERPL-CCNC: 75 U/L (ref 50–136)
ALT SERPL-CCNC: 32 U/L (ref 12–65)
ANION GAP SERPL CALC-SCNC: 13 MMOL/L (ref 7–16)
AST SERPL-CCNC: 41 U/L (ref 15–37)
BASOPHILS # BLD: 0 K/UL (ref 0–0.2)
BASOPHILS NFR BLD: 0 % (ref 0–2)
BILIRUB SERPL-MCNC: 0.6 MG/DL (ref 0.2–1.1)
BUN SERPL-MCNC: 36 MG/DL (ref 8–23)
CALCIUM SERPL-MCNC: 8.6 MG/DL (ref 8.3–10.4)
CHLORIDE SERPL-SCNC: 97 MMOL/L (ref 98–107)
CO2 SERPL-SCNC: 21 MMOL/L (ref 21–32)
CORTIS AM PEAK SERPL-MCNC: 43.3 UG/DL (ref 7–25)
CREAT SERPL-MCNC: 1.1 MG/DL (ref 0.6–1)
DIFFERENTIAL METHOD BLD: ABNORMAL
EKG ATRIAL RATE: 157 BPM
EKG DIAGNOSIS: NORMAL
EKG P AXIS: 45 DEGREES
EKG P-R INTERVAL: 144 MS
EKG Q-T INTERVAL: 262 MS
EKG QRS DURATION: 58 MS
EKG QTC CALCULATION (BAZETT): 423 MS
EKG R AXIS: 9 DEGREES
EKG T AXIS: 14 DEGREES
EKG VENTRICULAR RATE: 157 BPM
EOSINOPHIL # BLD: 0 K/UL (ref 0–0.8)
EOSINOPHIL NFR BLD: 0 % (ref 0.5–7.8)
ERYTHROCYTE [DISTWIDTH] IN BLOOD BY AUTOMATED COUNT: 16.2 % (ref 11.9–14.6)
GLOBULIN SER CALC-MCNC: 2.9 G/DL (ref 2.3–3.5)
GLUCOSE BLD STRIP.AUTO-MCNC: 196 MG/DL (ref 65–100)
GLUCOSE BLD STRIP.AUTO-MCNC: 252 MG/DL (ref 65–100)
GLUCOSE BLD STRIP.AUTO-MCNC: 328 MG/DL (ref 65–100)
GLUCOSE BLD STRIP.AUTO-MCNC: 363 MG/DL (ref 65–100)
GLUCOSE SERPL-MCNC: 257 MG/DL (ref 65–100)
HCT VFR BLD AUTO: 20.7 % (ref 35.8–46.3)
HCT VFR BLD AUTO: 22.6 % (ref 35.8–46.3)
HGB BLD-MCNC: 6.7 G/DL (ref 11.7–15.4)
HGB BLD-MCNC: 7.1 G/DL (ref 11.7–15.4)
IMM GRANULOCYTES # BLD AUTO: 0.2 K/UL (ref 0–0.5)
IMM GRANULOCYTES NFR BLD AUTO: 1 % (ref 0–5)
LYMPHOCYTES # BLD: 0.5 K/UL (ref 0.5–4.6)
LYMPHOCYTES NFR BLD: 2 % (ref 13–44)
MCH RBC QN AUTO: 25.3 PG (ref 26.1–32.9)
MCHC RBC AUTO-ENTMCNC: 31.4 G/DL (ref 31.4–35)
MCV RBC AUTO: 80.4 FL (ref 79.6–97.8)
MONOCYTES # BLD: 0.7 K/UL (ref 0.1–1.3)
MONOCYTES NFR BLD: 3 % (ref 4–12)
NEUTS SEG # BLD: 22.6 K/UL (ref 1.7–8.2)
NEUTS SEG NFR BLD: 94 % (ref 43–78)
NRBC # BLD: 0 K/UL (ref 0–0.2)
PLATELET # BLD AUTO: 333 K/UL (ref 150–450)
PLATELET COMMENT: ADEQUATE
PMV BLD AUTO: 10 FL (ref 9.4–12.3)
POTASSIUM SERPL-SCNC: 5.2 MMOL/L (ref 3.5–5.1)
PROCALCITONIN SERPL-MCNC: 6.36 NG/ML (ref 0–0.49)
PROT SERPL-MCNC: 5.1 G/DL (ref 6.3–8.2)
RBC # BLD AUTO: 2.81 M/UL (ref 4.05–5.2)
RBC MORPH BLD: ABNORMAL
SERVICE CMNT-IMP: ABNORMAL
SODIUM SERPL-SCNC: 131 MMOL/L (ref 136–145)
WBC # BLD AUTO: 24 K/UL (ref 4.3–11.1)
WBC MORPH BLD: ABNORMAL

## 2022-06-09 PROCEDURE — 83521 IG LIGHT CHAINS FREE EACH: CPT

## 2022-06-09 PROCEDURE — C9113 INJ PANTOPRAZOLE SODIUM, VIA: HCPCS | Performed by: STUDENT IN AN ORGANIZED HEALTH CARE EDUCATION/TRAINING PROGRAM

## 2022-06-09 PROCEDURE — 1100000000 HC RM PRIVATE

## 2022-06-09 PROCEDURE — 36415 COLL VENOUS BLD VENIPUNCTURE: CPT

## 2022-06-09 PROCEDURE — 85018 HEMOGLOBIN: CPT

## 2022-06-09 PROCEDURE — A4216 STERILE WATER/SALINE, 10 ML: HCPCS | Performed by: STUDENT IN AN ORGANIZED HEALTH CARE EDUCATION/TRAINING PROGRAM

## 2022-06-09 PROCEDURE — 99223 1ST HOSP IP/OBS HIGH 75: CPT | Performed by: INTERNAL MEDICINE

## 2022-06-09 PROCEDURE — 71045 X-RAY EXAM CHEST 1 VIEW: CPT

## 2022-06-09 PROCEDURE — 6360000002 HC RX W HCPCS: Performed by: STUDENT IN AN ORGANIZED HEALTH CARE EDUCATION/TRAINING PROGRAM

## 2022-06-09 PROCEDURE — 2580000003 HC RX 258: Performed by: STUDENT IN AN ORGANIZED HEALTH CARE EDUCATION/TRAINING PROGRAM

## 2022-06-09 PROCEDURE — 92610 EVALUATE SWALLOWING FUNCTION: CPT

## 2022-06-09 PROCEDURE — 2500000003 HC RX 250 WO HCPCS: Performed by: STUDENT IN AN ORGANIZED HEALTH CARE EDUCATION/TRAINING PROGRAM

## 2022-06-09 PROCEDURE — 6370000000 HC RX 637 (ALT 250 FOR IP): Performed by: INTERNAL MEDICINE

## 2022-06-09 PROCEDURE — 6370000000 HC RX 637 (ALT 250 FOR IP): Performed by: HOSPITALIST

## 2022-06-09 PROCEDURE — 82533 TOTAL CORTISOL: CPT

## 2022-06-09 PROCEDURE — 86923 COMPATIBILITY TEST ELECTRIC: CPT

## 2022-06-09 PROCEDURE — 6370000000 HC RX 637 (ALT 250 FOR IP): Performed by: STUDENT IN AN ORGANIZED HEALTH CARE EDUCATION/TRAINING PROGRAM

## 2022-06-09 PROCEDURE — 2580000003 HC RX 258: Performed by: INTERNAL MEDICINE

## 2022-06-09 PROCEDURE — 87040 BLOOD CULTURE FOR BACTERIA: CPT

## 2022-06-09 PROCEDURE — 80053 COMPREHEN METABOLIC PANEL: CPT

## 2022-06-09 PROCEDURE — 82962 GLUCOSE BLOOD TEST: CPT

## 2022-06-09 PROCEDURE — 6360000002 HC RX W HCPCS: Performed by: INTERNAL MEDICINE

## 2022-06-09 PROCEDURE — 85025 COMPLETE CBC W/AUTO DIFF WBC: CPT

## 2022-06-09 PROCEDURE — 97530 THERAPEUTIC ACTIVITIES: CPT

## 2022-06-09 PROCEDURE — 86900 BLOOD TYPING SEROLOGIC ABO: CPT

## 2022-06-09 PROCEDURE — 93005 ELECTROCARDIOGRAM TRACING: CPT | Performed by: STUDENT IN AN ORGANIZED HEALTH CARE EDUCATION/TRAINING PROGRAM

## 2022-06-09 PROCEDURE — 84145 PROCALCITONIN (PCT): CPT

## 2022-06-09 RX ORDER — METOPROLOL TARTRATE 5 MG/5ML
2.5 INJECTION INTRAVENOUS EVERY 6 HOURS
Status: DISCONTINUED | OUTPATIENT
Start: 2022-06-09 | End: 2022-06-10

## 2022-06-09 RX ORDER — LIDOCAINE 4 G/G
2 PATCH TOPICAL DAILY
Status: DISCONTINUED | OUTPATIENT
Start: 2022-06-09 | End: 2022-06-13 | Stop reason: HOSPADM

## 2022-06-09 RX ORDER — INSULIN LISPRO 100 [IU]/ML
0-8 INJECTION, SOLUTION INTRAVENOUS; SUBCUTANEOUS EVERY 6 HOURS
Status: DISCONTINUED | OUTPATIENT
Start: 2022-06-09 | End: 2022-06-10

## 2022-06-09 RX ORDER — SODIUM CHLORIDE 9 MG/ML
INJECTION, SOLUTION INTRAVENOUS PRN
Status: DISCONTINUED | OUTPATIENT
Start: 2022-06-09 | End: 2022-06-13 | Stop reason: HOSPADM

## 2022-06-09 RX ORDER — PREDNISONE 20 MG/1
10 TABLET ORAL EVERY OTHER DAY
Status: DISCONTINUED | OUTPATIENT
Start: 2022-06-10 | End: 2022-06-13 | Stop reason: HOSPADM

## 2022-06-09 RX ORDER — METOPROLOL TARTRATE 5 MG/5ML
5 INJECTION INTRAVENOUS ONCE
Status: COMPLETED | OUTPATIENT
Start: 2022-06-09 | End: 2022-06-09

## 2022-06-09 RX ADMIN — METOPROLOL TARTRATE 5 MG: 5 INJECTION INTRAVENOUS at 13:32

## 2022-06-09 RX ADMIN — METOPROLOL TARTRATE 2.5 MG: 5 INJECTION INTRAVENOUS at 16:33

## 2022-06-09 RX ADMIN — METOPROLOL TARTRATE 2.5 MG: 5 INJECTION INTRAVENOUS at 21:56

## 2022-06-09 RX ADMIN — METOPROLOL TARTRATE 2.5 MG: 5 INJECTION INTRAVENOUS at 09:52

## 2022-06-09 RX ADMIN — ACETAMINOPHEN 650 MG: 325 TABLET ORAL at 20:34

## 2022-06-09 RX ADMIN — CEFEPIME HYDROCHLORIDE 1000 MG: 1 INJECTION, POWDER, FOR SOLUTION INTRAMUSCULAR; INTRAVENOUS at 23:12

## 2022-06-09 RX ADMIN — CEFEPIME HYDROCHLORIDE 1000 MG: 1 INJECTION, POWDER, FOR SOLUTION INTRAMUSCULAR; INTRAVENOUS at 08:37

## 2022-06-09 RX ADMIN — VANCOMYCIN HYDROCHLORIDE 750 MG: 750 INJECTION, POWDER, LYOPHILIZED, FOR SOLUTION INTRAVENOUS at 20:23

## 2022-06-09 RX ADMIN — ACETAMINOPHEN 650 MG: 325 TABLET ORAL at 14:39

## 2022-06-09 RX ADMIN — METHYLPREDNISOLONE SODIUM SUCCINATE 40 MG: 125 INJECTION, POWDER, FOR SOLUTION INTRAMUSCULAR; INTRAVENOUS at 03:46

## 2022-06-09 RX ADMIN — INSULIN LISPRO 6 UNITS: 100 INJECTION, SOLUTION INTRAVENOUS; SUBCUTANEOUS at 11:46

## 2022-06-09 RX ADMIN — AZITHROMYCIN MONOHYDRATE 500 MG: 500 INJECTION, POWDER, LYOPHILIZED, FOR SOLUTION INTRAVENOUS at 22:00

## 2022-06-09 RX ADMIN — SODIUM CHLORIDE 40 MG: 9 INJECTION, SOLUTION INTRAMUSCULAR; INTRAVENOUS; SUBCUTANEOUS at 11:57

## 2022-06-09 RX ADMIN — SODIUM CHLORIDE: 9 INJECTION, SOLUTION INTRAVENOUS at 10:26

## 2022-06-09 RX ADMIN — INSULIN GLARGINE 5 UNITS: 100 INJECTION, SOLUTION SUBCUTANEOUS at 21:52

## 2022-06-09 RX ADMIN — INSULIN LISPRO 8 UNITS: 100 INJECTION, SOLUTION INTRAVENOUS; SUBCUTANEOUS at 08:31

## 2022-06-09 ASSESSMENT — PAIN DESCRIPTION - PAIN TYPE
TYPE: ACUTE PAIN

## 2022-06-09 ASSESSMENT — PAIN DESCRIPTION - DESCRIPTORS
DESCRIPTORS: SORE
DESCRIPTORS: SORE
DESCRIPTORS: ACHING
DESCRIPTORS: ACHING

## 2022-06-09 ASSESSMENT — PAIN SCALES - GENERAL
PAINLEVEL_OUTOF10: 9
PAINLEVEL_OUTOF10: 10
PAINLEVEL_OUTOF10: 0
PAINLEVEL_OUTOF10: 10
PAINLEVEL_OUTOF10: 1
PAINLEVEL_OUTOF10: 8
PAINLEVEL_OUTOF10: 10
PAINLEVEL_OUTOF10: 8
PAINLEVEL_OUTOF10: 10

## 2022-06-09 ASSESSMENT — PAIN DESCRIPTION - FREQUENCY
FREQUENCY: INTERMITTENT
FREQUENCY: CONTINUOUS
FREQUENCY: INTERMITTENT
FREQUENCY: CONTINUOUS

## 2022-06-09 ASSESSMENT — PAIN DESCRIPTION - LOCATION
LOCATION: GENERALIZED
LOCATION: RIB CAGE
LOCATION: GENERALIZED
LOCATION: RIB CAGE

## 2022-06-09 ASSESSMENT — PAIN DESCRIPTION - ONSET
ONSET: ON-GOING

## 2022-06-09 ASSESSMENT — PAIN DESCRIPTION - ORIENTATION
ORIENTATION: RIGHT;LEFT
ORIENTATION: RIGHT;LEFT

## 2022-06-09 ASSESSMENT — PAIN SCALES - WONG BAKER
WONGBAKER_NUMERICALRESPONSE: 8
WONGBAKER_NUMERICALRESPONSE: 4
WONGBAKER_NUMERICALRESPONSE: 8
WONGBAKER_NUMERICALRESPONSE: 10
WONGBAKER_NUMERICALRESPONSE: 10

## 2022-06-09 NOTE — PROGRESS NOTES
Roney Martin  Admission Date: 6/3/2022         [facility] Progress Note: 6/9/2022    Follow-up for: MARU, HypoNa, HyperK    The patient's chart is reviewed and the patient is discussed with the staff.     Subjective:   Pt seen and examined in room pt awake, following commands, remains in bilat mits, + SOB comfortable on 4 L O2.     ROS:  Gen - no fever, no chills, appetite poor  CV - no chest pain, no palpitation  Lung - + exertional shortness of breath, no cough  Abd - no tenderness, no nausea/vomiting, no diarrhea  Ext - no edema    Current Facility-Administered Medications   Medication Dose Route Frequency    cefepime (MAXIPIME) 1000 mg IVPB minibag in NS  1,000 mg IntraVENous Q12H    methylPREDNISolone sodium (SOLU-MEDROL) injection 40 mg  40 mg IntraVENous Q8H    [Held by provider] 0.9 % sodium chloride infusion   IntraVENous Continuous    azithromycin (ZITHROMAX) 500 mg in sodium chloride 0.9 % 250 mL IVPB ([id number])  500 mg IntraVENous Q24H    naloxone (NARCAN) injection 0.4 mg  0.4 mg IntraVENous PRN    oxyCODONE (ROXICODONE) immediate release tablet 5 mg  5 mg Oral Q8H PRN    vancomycin (VANCOCIN) 750 mg in sodium chloride 0.9 % 250 mL IVPB ([id number])  750 mg IntraVENous Q24H    amLODIPine (NORVASC) tablet 5 mg  5 mg Oral Daily    docusate sodium (COLACE) capsule 100 mg  100 mg Oral Daily    ferrous sulfate (IRON 325) tablet 325 mg  325 mg Oral Daily with breakfast    insulin glargine (LANTUS) injection vial 5 Units  5 Units SubCUTAneous Nightly    metoprolol succinate (TOPROL XL) extended release tablet 50 mg  50 mg Oral Daily    pantoprazole (PROTONIX) tablet 40 mg  40 mg Oral QAM AC    0.9 % sodium chloride infusion   IntraVENous PRN    acetaminophen (TYLENOL) tablet 650 mg  650 mg Oral Q6H PRN    Or    acetaminophen (TYLENOL) suppository 650 mg  650 mg Rectal Q6H PRN    ondansetron (ZOFRAN-ODT) disintegrating tablet 4 mg  4 mg Oral Q8H PRN    Or    results for input(s): PH, PCO2, PO2, HCO3 in the last 72 hours. Assessment:  (Medical Decision Making)     MARU, Hyponatremia, Hyperkalemia     Plan:  (Medical Decision Making)   1. Hyponatremia- Renata 12, Urine Osmo 498, Sosmo 270, SNa responded to IVF NS- improving  AM cortisol 43. 3- pt has been on steroids, TSH 1.110     2. Hyperkalemia- mild, lokelma as needed    3. MARU Cr trending up a bit, Cr 1.10, non oliguric   Check P/C ratio, light chains  Trend renal indices and monitor vanc levels in this setting     4. Leukocytosis on cefepime, vancomycin and azithromycin  per primary   Urine Cx mixed jimmy     5.  Anemia- transfuse hgb < 7.0    Weston Jack APRN - [facility] [place]

## 2022-06-09 NOTE — PROGRESS NOTES
Hospitalist Progress Note   Admit Date:  6/3/2022  4:48 PM   Name:  Gume Eden   Age:  80 y.o. Sex:  female  :  1933   MRN:  [id number]   Room:      Presenting Complaint: No chief complaint on file. Reason(s) for Admission: Traumatic pneumothorax, initial encounter [S27. Blossomwm Course & Interval History:   80 y. o. female with medical history of HTN, DM2, AV stenosis, anti-phospholipid antibody syndrome who presented to the [place] [address] after a fall onto her back, noted becoming dizzy in bathroom and falling. Denies LOC or head injury.  She had L sided back pain immediately after and since then. Upon ER evaluation, CXR with small left apical pneumothorax and small left pleural effusion with multiple rib fractures. Incidentally, her Hgb was found to be 7.0.  Hemoccult positive. Patient was admitted to [facility] for hospitalization. Her Hgb remained stable at 7.0. Repeat CXR showed slightly larger pneumothorax with tracheal shift and rib fractures. Pulmonology reviewed the films and requested transfer to . Considered chest tube but decided against it due to the amount of pain it would cause with rib fractures. Pulmonology following.     PT/OT with STR recs. CM assisting. Noted cannot take PPD, will use CXR, no evidence of TB. Subjective/24hr Events (22): Patient alert, supine in bed. \"Am I dying? \" She is oriented x 3 but is confused. No family present at bedside. She endorses pain all over and shortness of breath.      Assessment & Plan:     Pneumothorax, traumatic, resolved  -Pulmonology following  -No chest tube   -Scheduled Roxicodone for pain per pulm, follow closely in this elderly female  -CXR shows resolution   -Encourage IS and ambulation as tolerated  -Narcan overnight due to lethargy, pain medications discontinued, tough situation in patient with significant rib fx pain but unable to tolerate these narcotics    GI bleed, ruled out per GI 6/3, no endoscopy   -[facility] as [facility]    Acute blood loss anemia (ABLA)  Iron Deficiency  -Likely secondary to fall-related bruising  -Hold home NSAID's, continue PPI  -s/p 1 U PRBC 6/4 for Hgb 6.3  -Cont PO iron  -H&H 7.1/22.6, recheck this afternoon, may require additional transfusion    Aortic valve stenosis  -Noted    Type 2 diabetes mellitus (HCC)  -Cont SSI Humalog, Lantus 5 units QHS  -Steroids overnight have run her sugars up today, continue corrective insulin scale Q6H for now while NPO, steroids have been decreased to home dosing, next dose tomorrow, sugars should improve    Anti-phospholipid antibody syndrome  -Used to follow Rheumatology, appears to have been on Plaquenil at one point in distant past, currently on Prednisone 10 mg every other day, IV Solumedrol was initiated overnight and patient received 80 mg IV dosing, discontinue and resume home steroid dosing tomorrow    Hypertension  -Toprol-xl and Amlodipine held due to NPO status, initiate Metoprolol 2.5 mg IV Q6H, may need to increase to 5 mg Q6H if BP tolerates    Hypomagnesemia, resolved    Hyponatremia, improving  -Appears volume depletion due to elevated urine spec gravity and elevated BUN  -Nephrology following, continue IVF today     Urinary Retention  -Merritt intact, will attempt voiding trial in 24-48 hours    Acute respiratory failure with hypoxia ([address]  -Increase O2 demand overnight, placed on NRB at 15L and then weaned back to 4L NC this morning ,was on 4L NC during am rounding  -CXR shows new airspace opacity RLL, possible developing pneumonia  -Continuous pulse oximetry  -Wean as tolerated, appears to be on RA this afternoon     Suspected aspiration pneumonia vs. HCAP  -Initiated on Cefepime/Vanc/Azithromycin, leukocytosis from 12.3 --> 23.0 although high-dose steroids were also initiated which may also play a role, afebrile, elevated procal  -Continuous pulse oximetry, continue aggressive IS use, mobilization with PT/OT, pain control as able for rib fx  -UCx with mixed skin jimmy, BCx not drawn, will order to be drawn although antibiotics have already been initiated    Tachycardia  -Sustained rates 150's-160's per nursing staff this afternoon  -Metoprolol IV 5 mg x one, obtain EKG, order remote telemetry, consult cardiology    Oropharyngeal Dysphagia  -SLP following, continue NPO except critical meds crushed in applesauce  -MBS planned tomorrow    Discharge Planning:  Pending STR placement     Diet:  Diet NPO  DVT PPx: SCD's  Code status: DNR    Hospital Problems:  Principal Problem:    Pneumothorax, traumatic  Active Problems:    Aspiration pneumonia ([facility] [address]    GI bleed    Traumatic pneumothorax    Acute blood loss anemia (ABLA)    Hypomagnesemia    Hyponatremia    Acute urinary retention    Leukocytosis    Aortic valve stenosis    Type 2 diabetes mellitus ([facility] [address]    Hypertension  Resolved Problems:    * No resolved hospital problems.  *      Objective:     Patient Vitals for the past 24 hrs:   Temp Pulse Resp BP SpO2   06/09/22 1300 -- (!) 156 -- -- --   06/09/22 1257 -- (!) 111 -- (!) 149/67 94 %   06/09/22 1200 -- (!) 111 -- -- 95 %   06/09/22 1053 98.1 °F (36.7 °C) (!) 107 18 (!) 153/56 99 %   06/09/22 0945 -- (!) 110 -- -- --   06/09/22 0935 -- (!) 168 -- -- --   06/09/22 0930 -- (!) 162 -- -- --   06/09/22 0730 97.9 °F (36.6 °C) (!) 117 19 (!) 153/64 95 %   06/09/22 0645 -- -- -- -- 99 %   06/09/22 0600 -- -- -- -- 99 %   06/09/22 0345 -- -- -- -- 99 %   06/09/22 0241 98.4 °F (36.9 °C) 97 20 137/80 99 %   06/09/22 0140 -- -- -- -- 95 %   06/08/22 2300 -- -- -- -- 96 %   06/08/22 2212 99.7 °F (37.6 °C) (!) 115 18 (!) 136/57 98 %   06/08/22 2019 -- -- -- -- 99 %   06/08/22 1927 99.5 °F (37.5 °C) (!) 116 16 (!) 130/49 91 %   06/08/22 1832 -- -- 18 -- --   06/08/22 1511 98.6 °F (37 °C) (!) 102 18 (!) 134/58 94 %       Oxygen Therapy  SpO2: 95 %  O2 Device: High flow nasal cannula  Skin Assessment: Clean, dry, & intact  O2 Flow Collection Time: 06/08/22 12:03 AM   Result Value Ref Range    Color, UA YELLOW      Appearance CLEAR      Specific Gravity, UA >1.030 (H) 1.001 - 1.023    pH, Urine 6.0 5.0 - 9.0      Protein, UA 30 (A) NEG mg/dL    Glucose,  mg/dL    Ketones, Urine TRACE (A) NEG mg/dL    Bilirubin Urine Negative NEG      Blood, Urine Negative NEG      Urobilinogen, Urine 0.2 0.2 - 1.0 EU/dL    Nitrite, Urine Negative NEG      Leukocyte Esterase, Urine Negative NEG      WBC, UA 5-10 0 /hpf    Epithelial Cells UA 0-3 0 /hpf    BACTERIA, URINE 4+ (H) 0 /hpf    Crystals CA OXALATE 0 /LPF    AMORPHOUS CRYSTAL 1+ (H) 0   Culture, Urine    Collection Time: 06/08/22 12:03 AM    Specimen: URINE-CLEAN CATCH   Result Value Ref Range    Special Requests NO SPECIAL REQUESTS      Culture        50,000-100,000 COLONIES/mL MIXED SKIN MELANIE ISOLATED   Osmolality    Collection Time: 06/08/22  3:25 AM   Result Value Ref Range    Serum Osmolality 270 (L) 280 - 301 MOSM/kg G3G   Basic Metabolic Panel    Collection Time: 06/08/22  3:25 AM   Result Value Ref Range    Sodium 129 (L) 136 - 145 mmol/L    Potassium 3.9 3.5 - 5.1 mmol/L    Chloride 95 (L) 98 - 107 mmol/L    CO2 25 21 - 32 mmol/L    Anion Gap 9 7 - 16 mmol/L    Glucose 139 (H) 65 - 100 mg/dL    BUN 26 (H) 8 - 23 MG/DL    CREATININE 1.00 0.6 - 1.0 MG/DL    GFR African American >60 >60 ml/min/1.73m2    GFR Non- 56 (L) >60 ml/min/1.73m2    Calcium 9.1 8.3 - 10.4 MG/DL   CBC with Auto Differential    Collection Time: 06/08/22  3:25 AM   Result Value Ref Range    WBC 12.3 (H) 4.3 - 11.1 K/uL    RBC 2.99 (L) 4.05 - 5.2 M/uL    Hemoglobin 7.5 (L) 11.7 - 15.4 g/dL    Hematocrit 23.4 (L) 35.8 - 46.3 %    MCV 78.3 (L) 79.6 - 97.8 FL    MCH 25.1 (L) 26.1 - 32.9 PG    MCHC 32.1 31.4 - 35.0 g/dL    RDW 16.1 (H) 11.9 - 14.6 %    Platelets 224 310 - 941 K/uL    MPV 10.4 9.4 - 12.3 FL    nRBC 0.00 0.0 - 0.2 K/uL    Differential Type AUTOMATED      Seg Neutrophils 74 43 - 78 % Lymphocytes 13 13 - 44 %    Monocytes 10 4.0 - 12.0 %    Eosinophils % 2 0.5 - 7.8 %    Basophils 0 0.0 - 2.0 %    Immature Granulocytes 1 0.0 - 5.0 %    Segs Absolute 9.1 (H) 1.7 - 8.2 K/UL    Absolute Lymph # 1.7 0.5 - 4.6 K/UL    Absolute Mono # 1.2 0.1 - 1.3 K/UL    Absolute Eos # 0.2 0.0 - 0.8 K/UL    Basophils Absolute 0.0 0.0 - 0.2 K/UL    Absolute Immature Granulocyte 0.1 0.0 - 0.5 K/UL   Hepatic Function Panel    Collection Time: 06/08/22  3:25 AM   Result Value Ref Range    Total Protein 6.2 (L) 6.3 - 8.2 g/dL    Albumin 2.5 (L) 3.2 - 4.6 g/dL    Globulin 3.7 (H) 2.3 - 3.5 g/dL    Albumin/Globulin Ratio 0.7 (L) 1.2 - 3.5      Total Bilirubin 0.5 0.2 - 1.1 MG/DL    Bilirubin, Direct 0.1 <0.4 MG/DL    Alk Phosphatase 66 50 - 136 U/L    AST 31 15 - 37 U/L    ALT 24 12 - 65 U/L   Magnesium    Collection Time: 06/08/22  3:25 AM   Result Value Ref Range    Magnesium 2.3 1.8 - 2.4 mg/dL   POCT Glucose    Collection Time: 06/08/22  7:22 AM   Result Value Ref Range    POC Glucose 152 (H) 65 - 100 mg/dL    Performed by: Devi    POCT Glucose    Collection Time: 06/08/22 11:19 AM   Result Value Ref Range    POC Glucose 207 (H) 65 - 100 mg/dL    Performed by:  Devi    Uric Acid    Collection Time: 06/08/22  2:29 PM   Result Value Ref Range    Uric Acid 5.6 2.6 - 6.0 MG/DL   TSH    Collection Time: 06/08/22  2:29 PM   Result Value Ref Range    TSH, 3RD GENERATION 1.110 0.358 - 3.740 uIU/mL   proBNP, N-TERMINAL    Collection Time: 06/08/22  2:29 PM   Result Value Ref Range    NT Pro-BNP 1,374 (H) <450 PG/ML   Osmolality, Urine    Collection Time: 06/08/22  3:00 PM   Result Value Ref Range    Osmolality, Ur 498 50 - 1400 MOSM/kg H2O   Sodium, urine, random    Collection Time: 06/08/22  3:00 PM   Result Value Ref Range    SODIUM, RANDOM URINE 12 MMOL/L   Creatinine, Random Urine    Collection Time: 06/08/22  3:00 PM   Result Value Ref Range    Creatinine, Ur 131.00 mg/dL   POCT Glucose    Collection Time: 06/08/22  4:15 PM   Result Value Ref Range    POC Glucose 209 (H) 65 - 100 mg/dL    Performed by:  LAURA Casillas 38    POCT Glucose    Collection Time: 06/08/22  8:28 PM   Result Value Ref Range    POC Glucose 208 (H) 65 - 100 mg/dL    Performed by: Patrick    Cortisol AM, Total    Collection Time: 06/09/22  3:30 AM   Result Value Ref Range    Cortisol - AM 43.3 (H) 7 - 25 ug/dL   CBC with Auto Differential    Collection Time: 06/09/22  3:30 AM   Result Value Ref Range    WBC 24.0 (H) 4.3 - 11.1 K/uL    RBC 2.81 (L) 4.05 - 5.2 M/uL    Hemoglobin 7.1 (L) 11.7 - 15.4 g/dL    Hematocrit 22.6 (L) 35.8 - 46.3 %    MCV 80.4 79.6 - 97.8 FL    MCH 25.3 (L) 26.1 - 32.9 PG    MCHC 31.4 31.4 - 35.0 g/dL    RDW 16.2 (H) 11.9 - 14.6 %    Platelets 418 265 - 362 K/uL    MPV 10.0 9.4 - 12.3 FL    nRBC 0.00 0.0 - 0.2 K/uL    Seg Neutrophils 94 (H) 43 - 78 %    Lymphocytes 2 (L) 13 - 44 %    Monocytes 3 (L) 4.0 - 12.0 %    Eosinophils % 0 (L) 0.5 - 7.8 %    Basophils 0 0.0 - 2.0 %    Immature Granulocytes 1 0.0 - 5.0 %    Segs Absolute 22.6 (H) 1.7 - 8.2 K/UL    Absolute Lymph # 0.5 0.5 - 4.6 K/UL    Absolute Mono # 0.7 0.1 - 1.3 K/UL    Absolute Eos # 0.0 0.0 - 0.8 K/UL    Basophils Absolute 0.0 0.0 - 0.2 K/UL    Absolute Immature Granulocyte 0.2 0.0 - 0.5 K/UL    RBC Comment MODERATE  ANISOCYTOSIS + POIKILOCYTOSIS        RBC Comment SLIGHT  SCHISTOCYTES        RBC Comment SLIGHT  HYPOCHROMIA        RBC Comment OCCASIONAL  OVALOCYTES        RBC Comment OCCASIONAL  SPHEROCYTES        WBC Comment Result Confirmed By Smear      Platelet Comment ADEQUATE      Differential Type AUTOMATED     Procalcitonin    Collection Time: 06/09/22  3:30 AM   Result Value Ref Range    Procalcitonin 6.36 (H) 0.00 - 0.49 ng/mL   Comprehensive Metabolic Panel    Collection Time: 06/09/22  3:30 AM   Result Value Ref Range    Sodium 131 (L) 136 - 145 mmol/L    Potassium 5.2 (H) 3.5 - 5.1 mmol/L    Chloride 97 (L) 98 - 107 mmol/L    CO2 21 21 - 32 mmol/L    Anion Gap 13 7 - 16 mmol/L    Glucose 257 (H) 65 - 100 mg/dL    BUN 36 (H) 8 - 23 MG/DL    CREATININE 1.10 (H) 0.6 - 1.0 MG/DL    GFR African American >60 >60 ml/min/1.73m2    GFR Non- 50 (L) >60 ml/min/1.73m2    Calcium 8.6 8.3 - 10.4 MG/DL    Total Bilirubin 0.6 0.2 - 1.1 MG/DL    ALT 32 12 - 65 U/L    AST 41 (H) 15 - 37 U/L    Alk Phosphatase 75 50 - 136 U/L    Total Protein 5.1 (L) 6.3 - 8.2 g/dL    Albumin 2.2 (L) 3.2 - 4.6 g/dL    Globulin 2.9 2.3 - 3.5 g/dL    Albumin/Globulin Ratio 0.8 (L) 1.2 - 3.5     POCT Glucose    Collection Time: 06/09/22  7:31 AM   Result Value Ref Range    POC Glucose 363 (H) 65 - 100 mg/dL    Performed by: LAURA Casillas 38        Other Studies:  XR CHEST 1 VIEW    Result Date: 6/6/2022  EXAM: Chest x-ray. INDICATION: Dyspnea. COMPARISON: Yesterday's chest x-ray. TECHNIQUE: Frontal view chest x-ray. FINDINGS: There is a decreased tiny left apical pneumothorax. Left lung base atelectasis or contusion and a small left pleural effusion are unchanged. There is a new small right pleural effusion. Right lung remains clear. The heart size is normal. There are left-sided rib fractures. 1. Decreased tiny left apical pneumothorax. 2. Unchanged left lung base atelectasis or contusion. 3. Small bilateral pleural effusions, unchanged on the left and new on the right. XR CHEST 1 VIEW    Result Date: 6/5/2022  EXAM: Chest x-ray. INDICATION: Follow-up pneumothorax. COMPARISON: Yesterday's chest x-ray. TECHNIQUE: Frontal view chest x-ray. FINDINGS: There is a slightly enlarged small left apical pneumothorax, in this patient with multiple left rib fractures. Left lung base atelectasis or contusion and a small left pleural effusion are unchanged. Right lung and pleural space remain clear. The heart size is normal.     1. Slightly enlarged small left apical pneumothorax, in this patient with multiple left rib fractures.  2. Unchanged left lung base atelectasis or contusion dextrose bolus 10% 125 mL  125 mL IntraVENous PRN    Or    dextrose bolus 10% 250 mL  250 mL IntraVENous PRN    glucagon injection 1 mg  1 mg IntraMUSCular PRN    dextrose 5 % solution  100 mL/hr IntraVENous PRN     Labs, vital signs, diagnostic imaging reviewed. Case discussed with patient, care team, Dr. Yesica Rae.   Signed: [facility]

## 2022-06-09 NOTE — FLOWSHEET NOTE
Patient on 4 L HF NC. Continuous pulse ox in use. Safety measures noted. Will continue to monitor per policy.      06/09/22 0706   Handoff   Communication Given Shift Handoff   Handoff Received From PAM Peck   Handoff Communication Face to Face

## 2022-06-09 NOTE — PROGRESS NOTES
[facility] Hospitalist Service  At the heart of better care     Advance Care Planning   Admit Date:  6/3/2022  4:48 PM   Name:  Marcy Crockett   Age:  80 y.o. Sex:  female  :  1933   MRN:  [id number]   Room:  [address] [place] is able to make her own decisions:   NO    If pt unable to make decisions, POA/surrogate decision maker:  Federico Cuellar (patient's daughter)    Other members present in the meeting:     Multiple other family members at bedside. Patient / surrogate decision-maker directed: Other ACP topics discussed, if applicable:     Patient would not want life-saving measures initiated, DNR/DNI    Patient or surrogate consented to discussion of the current conditions, workup, management plans, prognosis, and understand the risk for further deterioration. Time spent: 20 minutes in direct discussion (face to face and/or over phone).       Signed:  Vazquez Ferreira DO

## 2022-06-09 NOTE — PROGRESS NOTES
Pt in bed; A&O to person. Pt is very talkative this morning with confusion noted. Respirations even and unlabored on 4L HF NC. Continuous pulse oximetry in place with O2 sat 99%. No signs of distress noted at this time. Merritt in place and draining. Safety measures are in place. Will continue to monitor and give report to oncoming RN.

## 2022-06-09 NOTE — H&P
[address] Cardiology Initial Cardiac Evaluation   Primary Cardiologist: Dr. Noemi Kurtz  Attending Cardiologist: Dr. Antelmo Li      Date of  Admission: 6/3/2022  4:48 PM     HPI:  Efren Pedraza is a 80 y.o. female with h/o HTN, DM II, moderate AS, anti-phospholipid antibody syndrome and Terrance Red presented to the [place] [address] 6/2/22 after a fall onto her back. She denied LOC or head injury.  She had L sided back pain immediately after fall. Upon ER evaluation, CXR showed a small left apical pneumothorax and small left pleural effusion with multiple rib fractures. Hgb was found to be 7.0 and hemoccult was positive. Patient was admitted to [facility] for hospitalization. Her Hgb remained stable at 7.0. Repeat CXR showed slightly larger pneumothorax with tracheal shift and rib fractures. Pulmonology reviewed the films and requested Pt transfer to . She had hypoxia and confusion after pain medications last night and received Narcan with improvement. She is intolerant to pain meds. She reports a great deal of pain 10/10. She has sinus tachycardia and HR up to 150s today. ECG showed  bpm. She denies CP, SOB, palpitations. She had been on PO BB which was stopped with concern for aspiration pneumonia. Cardiology was consulted for sinus tachycardia. Past Medical History:   Diagnosis Date    Asthma     stable    Diabetes ([address]     borderline    GERD (gastroesophageal reflux disease)     well controlled    Hip fracture, right ([address] 11/18/2011    Hypertension     MVP (mitral valve prolapse)       Past Surgical History:   Procedure Laterality Date    ORTHOPEDIC SURGERY      right rotator cuff    OTHER SURGICAL HISTORY      hemorrhoidectomy.      TUBAL LIGATION         Allergies   Allergen Reactions    Morphine And Related Nausea And Vomiting    Oxycodone Nausea And Vomiting    Penicillins Rash    Lidocaine Other (See Comments)     \"Paralysis\"    Statins Other (See Comments)     Severe leg pain    Hydrocodone-Acetaminophen Nausea And Vomiting    Meperidine Nausea And Vomiting      Social History     Socioeconomic History    Marital status:      Spouse name: Not on file    Number of children: Not on file    Years of education: Not on file    Highest education level: Not on file   Occupational History    Not on file   Tobacco Use    Smoking status: Never Smoker    Smokeless tobacco: Never Used   Substance and Sexual Activity    Alcohol use: Yes    Drug use: No    Sexual activity: Not on file   Other Topics Concern    Not on file   Social History Narrative    Not on file     Social Determinants of Health     Financial Resource Strain:     Difficulty of Paying Living Expenses: Not on file   Food Insecurity:     Worried About Running Out of Food in the Last Year: Not on file    Rosanne of Food in the Last Year: Not on file   Transportation Needs:     Lack of Transportation (Medical): Not on file    Lack of Transportation (Non-Medical):  Not on file   Physical Activity:     Days of Exercise per Week: Not on file    Minutes of Exercise per Session: Not on file   Stress:     Feeling of Stress : Not on file   Social Connections:     Frequency of Communication with Friends and Family: Not on file    Frequency of Social Gatherings with Friends and Family: Not on file    Attends Synagogue Services: Not on file    Active Member of [address] [place] [address] [employer] or Organizations: Not on file    Attends Club or Organization Meetings: Not on file    Marital Status: Not on file   Intimate Partner Violence:     Fear of Current or Ex-Partner: Not on file    Emotionally Abused: Not on file    Physically Abused: Not on file    Sexually Abused: Not on file   Housing Stability:     Unable to Pay for Housing in the Last Year: Not on file    Number of [place] in the Last Year: Not on file    Unstable Housing in the Last Year: Not on file     Family History   Problem Relation Age of Onset    Stroke Mother    Cherri Stroke Father         Current Facility-Administered Medications   Medication Dose Route Frequency    cefepime (MAXIPIME) 1000 mg IVPB minibag in NS  1,000 mg IntraVENous Q12H    metoprolol (LOPRESSOR) injection 2.5 mg  2.5 mg IntraVENous Q6H    pantoprazole (PROTONIX) 40 mg in sodium chloride (PF) 10 mL injection  40 mg IntraVENous Daily    [START ON 6/10/2022] predniSONE (DELTASONE) tablet 10 mg  10 mg Oral Every Other Day    insulin lispro (HUMALOG) injection vial 0-8 Units  0-8 Units SubCUTAneous Q6H    0.9 % sodium chloride infusion   IntraVENous Continuous    azithromycin (ZITHROMAX) 500 mg in sodium chloride 0.9 % 250 mL IVPB ([id number])  500 mg IntraVENous Q24H    naloxone (NARCAN) injection 0.4 mg  0.4 mg IntraVENous PRN    oxyCODONE (ROXICODONE) immediate release tablet 5 mg  5 mg Oral Q8H PRN    vancomycin (VANCOCIN) 750 mg in sodium chloride 0.9 % 250 mL IVPB ([id number])  750 mg IntraVENous Q24H    [Held by provider] amLODIPine (NORVASC) tablet 5 mg  5 mg Oral Daily    [Held by provider] docusate sodium (COLACE) capsule 100 mg  100 mg Oral Daily    [Held by provider] ferrous sulfate (IRON 325) tablet 325 mg  325 mg Oral Daily with breakfast    insulin glargine (LANTUS) injection vial 5 Units  5 Units SubCUTAneous Nightly    [Held by provider] metoprolol succinate (TOPROL XL) extended release tablet 50 mg  50 mg Oral Daily    [Held by provider] pantoprazole (PROTONIX) tablet 40 mg  40 mg Oral QAM AC    0.9 % sodium chloride infusion   IntraVENous PRN    acetaminophen (TYLENOL) tablet 650 mg  650 mg Oral Q6H PRN    Or    acetaminophen (TYLENOL) suppository 650 mg  650 mg Rectal Q6H PRN    ondansetron (ZOFRAN-ODT) disintegrating tablet 4 mg  4 mg Oral Q8H PRN    Or    ondansetron (ZOFRAN) injection 4 mg  4 mg IntraVENous Q6H PRN    polyethylene glycol (GLYCOLAX) packet 17 g  17 g Oral Daily PRN    albuterol sulfate  (90 Base) MCG/ACT inhaler 2 puff  2 puff Inhalation Q4H PRN    [Held by provider] Vitamin D (CHOLECALCIFEROL) tablet 5,000 Units  5,000 Units Oral Daily    glucose chewable tablet 16 g  4 tablet Oral PRN    dextrose bolus 10% 125 mL  125 mL IntraVENous PRN    Or    dextrose bolus 10% 250 mL  250 mL IntraVENous PRN    glucagon injection 1 mg  1 mg IntraMUSCular PRN    dextrose 5 % solution  100 mL/hr IntraVENous PRN       Review of symptoms:  General: no recent weight loss/gain, +weakness, +fatigue, denies, fever or chills   Skin: no rashes, lumps, or other skin changes   HEENT: no headache, +dizziness, lightheadedness, vision changes, hearing changes, tinnitus, vertigo, sinus pressure/pain, bleeding gums, sore throat, or hoarseness   Neck: no swollen glands, goiter, pain or stiffness   Respiratory: no cough, sputum, hemoptysis, +dyspnea, wheezing   Cardiovascular: +chest pain or discomfort, palpitations, +dyspnea, orthopnea, paroxysmal nocturnal dyspnea, no peripheral edema   Gastrointestinal: no trouble swallowing, heartburn, change of appetite, nausea, change in bowel habits, pain with defecation, rectal bleeding or black/tarry stools, hemorrhoids, constipation, diarrhea, abdominal pain, jaundice, liver or gallbladder problems   Urinary: no frequency, urgency , hematuria, burning/pain with urination, recent flank pain, polyuria, nocturia, or difficulty urinating   Genital: no vaginal or pelvic infections   Peripheral Vascular: no claudication, leg cramps, prior DVTs, swelling of calves, legs, or feet, color change, or swelling with redness or tenderness   Musculoskeletal: +muscle or joint pain/stiffness, joint swelling, erythema of joints,+ back pain   Psychiatric: no depression, mental disorders, or excessive stress   Neurological: no history of CVA, dizziness, no sensory or motor loss, seizures, syncope, tremors, numbness, tingling, no changes in mood, attention, or speech, no changes in orientation, memory, insight, or judgment. no headache, vertigo.    Hematologic: +anemia, easy bruising or bleeding   Endocrine: + diabetes, thyroid problems, heat or cold intolerance, excessive sweating, polyuria, polydipsia        Subjective:   Physical Exam  Vitals:    06/09/22 1257 06/09/22 1300 06/09/22 1330 06/09/22 1345   BP: (!) 149/67      Pulse: (!) 111 (!) 156 (!) 156 97   Resp:       Temp:       TempSrc:       SpO2: 94%   94%   Weight:       Height:          GEN: A&O but confused and talking w/o making sense at times  HEENT: NCAT, PERRL, no JVD or carotid bruits  CV: S1S2 RRR, rapid, + AS murmur  Chest: CTA B, diminished, poor inspiration  Abd: soft, +BS, non tender  Ext: no LE edema, cyanosis or clubbing    Labs:   Recent Results (from the past 24 hour(s))   Uric Acid    Collection Time: 06/08/22  2:29 PM   Result Value Ref Range    Uric Acid 5.6 2.6 - 6.0 MG/DL   TSH    Collection Time: 06/08/22  2:29 PM   Result Value Ref Range    TSH, 3RD GENERATION 1.110 0.358 - 3.740 uIU/mL   proBNP, N-TERMINAL    Collection Time: 06/08/22  2:29 PM   Result Value Ref Range    NT Pro-BNP 1,374 (H) <450 PG/ML   Osmolality, Urine    Collection Time: 06/08/22  3:00 PM   Result Value Ref Range    Osmolality, Ur 498 50 - 1400 MOSM/kg H2O   Sodium, urine, random    Collection Time: 06/08/22  3:00 PM   Result Value Ref Range    SODIUM, RANDOM URINE 12 MMOL/L   Creatinine, Random Urine    Collection Time: 06/08/22  3:00 PM   Result Value Ref Range    Creatinine, Ur 131.00 mg/dL   POCT Glucose    Collection Time: 06/08/22  4:15 PM   Result Value Ref Range    POC Glucose 209 (H) 65 - 100 mg/dL    Performed by:  Devi    POCT Glucose    Collection Time: 06/08/22  8:28 PM   Result Value Ref Range    POC Glucose 208 (H) 65 - 100 mg/dL    Performed by: Patrick    Cortisol AM, Total    Collection Time: 06/09/22  3:30 AM   Result Value Ref Range    Cortisol - AM 43.3 (H) 7 - 25 ug/dL   CBC with Auto Differential    Collection Time: 06/09/22  3:30 AM   Result Value Ref Range    WBC 24.0 (H) 4.3 - 11.1 K/uL    RBC 2.81 (L) 4.05 - 5.2 M/uL    Hemoglobin 7.1 (L) 11.7 - 15.4 g/dL    Hematocrit 22.6 (L) 35.8 - 46.3 %    MCV 80.4 79.6 - 97.8 FL    MCH 25.3 (L) 26.1 - 32.9 PG    MCHC 31.4 31.4 - 35.0 g/dL    RDW 16.2 (H) 11.9 - 14.6 %    Platelets 168 032 - 846 K/uL    MPV 10.0 9.4 - 12.3 FL    nRBC 0.00 0.0 - 0.2 K/uL    Seg Neutrophils 94 (H) 43 - 78 %    Lymphocytes 2 (L) 13 - 44 %    Monocytes 3 (L) 4.0 - 12.0 %    Eosinophils % 0 (L) 0.5 - 7.8 %    Basophils 0 0.0 - 2.0 %    Immature Granulocytes 1 0.0 - 5.0 %    Segs Absolute 22.6 (H) 1.7 - 8.2 K/UL    Absolute Lymph # 0.5 0.5 - 4.6 K/UL    Absolute Mono # 0.7 0.1 - 1.3 K/UL    Absolute Eos # 0.0 0.0 - 0.8 K/UL    Basophils Absolute 0.0 0.0 - 0.2 K/UL    Absolute Immature Granulocyte 0.2 0.0 - 0.5 K/UL    RBC Comment MODERATE  ANISOCYTOSIS + POIKILOCYTOSIS        RBC Comment SLIGHT  SCHISTOCYTES        RBC Comment SLIGHT  HYPOCHROMIA        RBC Comment OCCASIONAL  OVALOCYTES        RBC Comment OCCASIONAL  SPHEROCYTES        WBC Comment Result Confirmed By Smear      Platelet Comment ADEQUATE      Differential Type AUTOMATED     Procalcitonin    Collection Time: 06/09/22  3:30 AM   Result Value Ref Range    Procalcitonin 6.36 (H) 0.00 - 0.49 ng/mL   Comprehensive Metabolic Panel    Collection Time: 06/09/22  3:30 AM   Result Value Ref Range    Sodium 131 (L) 136 - 145 mmol/L    Potassium 5.2 (H) 3.5 - 5.1 mmol/L    Chloride 97 (L) 98 - 107 mmol/L    CO2 21 21 - 32 mmol/L    Anion Gap 13 7 - 16 mmol/L    Glucose 257 (H) 65 - 100 mg/dL    BUN 36 (H) 8 - 23 MG/DL    CREATININE 1.10 (H) 0.6 - 1.0 MG/DL    GFR African American >60 >60 ml/min/1.73m2    GFR Non- 50 (L) >60 ml/min/1.73m2    Calcium 8.6 8.3 - 10.4 MG/DL    Total Bilirubin 0.6 0.2 - 1.1 MG/DL    ALT 32 12 - 65 U/L    AST 41 (H) 15 - 37 U/L    Alk Phosphatase 75 50 - 136 U/L    Total Protein 5.1 (L) 6.3 - 8.2 g/dL    Albumin 2.2 (L) 3.2 - 4.6 g/dL    Globulin 2.9 2.3 - 3.5 g/dL Albumin/Globulin Ratio 0.8 (L) 1.2 - 3.5     POCT Glucose    Collection Time: 06/09/22  7:31 AM   Result Value Ref Range    POC Glucose 363 (H) 65 - 100 mg/dL    Performed by: Devi    POCT Glucose    Collection Time: 06/09/22 10:55 AM   Result Value Ref Range    POC Glucose 328 (H) 65 - 100 mg/dL    Performed by: Devi    EKG 12 Lead    Collection Time: 06/09/22  1:08 PM   Result Value Ref Range    Ventricular Rate 157 BPM    Atrial Rate 157 BPM    P-R Interval 144 ms    QRS Duration 58 ms    Q-T Interval 262 ms    QTc Calculation (Bazett) 423 ms    P Axis 45 degrees    R Axis 9 degrees    T Axis 14 degrees    Diagnosis Sinus tachycardia        Pt was seen and examined by Dr. Libia Moreno, he agrees with the following A&P. Assessment/Plan:        Diagnosis    Sinus tachycardia- treat underlying causes: anemia, extreme pain, dehydration- IV BB as needed     Fall with multiple rib fractures and traumatic pneumothorax- pain control per primary team    Aspiration pneumonia- per primary team/pulmonary    Hypertension- continue meds, primary team to manage now that NPO    Aortic valve stenosis- moderate by last echo    Acute blood loss anemia (ABLA)- Hgb 7.1    Leukocytosis    Type 2 diabetes mellitus ([address]- per primary team       Thank you for allowing us to participate in the care of this patient, we will continue to follow along with you.     Karyna Smith PA-C

## 2022-06-09 NOTE — PROGRESS NOTES
PT was in bed with Daughter at bedside. Daughter expressed affection and concern for PT. Daughter expressed importance of Julieth. Daughter is Eliseo Em is [facility]. [address] [place] [address] offered prayer and additional support. Rev. Natalio Ruby M.Div.

## 2022-06-09 NOTE — PROGRESS NOTES
PHYSICAL THERAPY Daily Note and AM  (Link to Caseload Tracking: PT Visit Days : 3  Time In/Out PT Charge Capture  Rehab Caseload Tracker  Orders      Philip Mccarthy is a 80 y.o. female   PRIMARY DIAGNOSIS: Pneumothorax, traumatic  Traumatic pneumothorax, initial encounter [S27. 0XXA]       Inpatient: Payor: Romulo Barajas / Plan: Jorge Plummer PPO / Product Type: Medicare /     ASSESSMENT:     REHAB RECOMMENDATIONS:   Recommendation to date pending progress:  Setting:   Short-term Rehab    Equipment:     To Be Determined     ASSESSMENT:  Ms. Sage Alfred  Presents supine and saying she cannot move or do anything because she is dying. It took a lot of convincing and help from CNA to get her to sit up. She sat on the EOB with fair balance and she was able to do a stand pivot to the chair with Mod A.   No progress     SUBJECTIVE:   Ms. Sage Alfred states, \"I can't move\"     Social/Functional Lives With: Family  Type of Home: House  Home Layout: Two level,Bed/Bath upstairs  Home Access: Level entry  Bathroom Shower/Tub: Walk-in shower  Bathroom Toilet: Handicap height  Bathroom Equipment: Shower chair,Hand-held shower  Bathroom Accessibility: Accessible  Home Equipment: Rollator,Grab bars  Has the patient had two or more falls in the past year or any fall with injury in the past year?: No  Receives Help From: Family  ADL Assistance: [address]: Independent  Homemaking Responsibilities: Yes  Laundry Responsibility: Primary  Cleaning Responsibility: Primary  Bill Paying/Finance Responsibility: Primary  Shopping Responsibility: Primary  [address]: [address] Management: Primary  Ambulation Assistance: Independent  Transfer Assistance: Independent  Active : No  Patient's  Info: [facility]  Mode of Transportation: Car  Occupation: Retired  OBJECTIVE:     PAIN: VITALS / O2: PRECAUTION / Saúl [employer]ames / [place] Stapler:   Pre Treatment:          Enoch Fowler mentioned Vitals        Oxygen    None    RESTRICTIONS/PRECAUTIONS:        MOBILITY: I Mod I S SBA CGA Min Mod Max Total  NT x2 Comments:   Bed Mobility    Rolling [] [] [] [] [] [] [] [] [] [] []    Supine to Sit [] [] [] [] [] [] [x] [] [] [] []    Scooting [] [] [] [] [] [] [x] [] [] [] []    Sit to Supine [] [] [] [] [] [] [] [] [] [] []    Transfers    Sit to Stand [] [] [] [] [] [x] [x] [] [] [] []    Bed to Chair [] [] [] [] [] [] [x] [] [] [] []    Stand to Sit [] [] [] [] [] [x] [] [] [] [] []     [] [] [] [] [] [] [] [] [] [] []    I=Independent, Mod I=Modified Independent, S=Supervision, SBA=Standby Assistance, CGA=Contact Guard Assistance,   Min=Minimal Assistance, Mod=Moderate Assistance, Max=Maximal Assistance, Total=Total Assistance, NT=Not Tested    BALANCE: Good Fair+ Fair Fair- Poor NT Comments   Sitting Static [] [] [] [x] [] []    Sitting Dynamic [] [] [] [x] [x] []              Standing Static [] [] [x] [] [] []    Standing Dynamic [] [] [] [x] [] []      GAIT: I Mod I S SBA CGA Min Mod Max Total  NT x2 Comments:   Level of Assistance [] [] [] [] [] [x] [x] [] [] [] []    Distance   feet    DME Rolling Walker    Gait Quality very slow and shuffled    Weightbearing Status      Stairs      I=Independent, Mod I=Modified Independent, S=Supervision, SBA=Standby Assistance, CGA=Contact Guard Assistance,   Min=Minimal Assistance, Mod=Moderate Assistance, Max=Maximal Assistance, Total=Total Assistance, NT=Not Tested    PLAN:   ACUTE PHYSICAL THERAPY GOALS:   (Developed with and agreed upon by patient and/or caregiver. )  LTG:  (1.)Ms. Maeve Cuba will move from supine to sit and sit to supine , scoot up and down and roll side to side in bed with MINIMAL ASSIST within 7 treatment day(s). (2.)Ms. Maeve Cuba will transfer from bed to chair and chair to bed with MINIMAL ASSIST using the least restrictive device within 7 treatment day(s). (3.)Ms. Maeve Cuba will ambulate with MINIMAL ASSIST for 50 feet with the least restrictive device within 7 treatment day(s). (4.)Ms. Robert Mariano will tolerate at least 23 min of dynamic standing activity to assist standing ADLs with the least restrictive device within 7 treatment days. (5.)Ms. Robert Mariano will climb at least 16 steps with MODERATE ASSIST with the least restrictive device within 7 treatment days. FREQUENCY AND DURATION: 3 times/week for duration of hospital stay or until stated goals are met, whichever comes first.    TREATMENT:   TREATMENT:   Therapeutic Activity (25 Minutes): Therapeutic activity included Supine to Sit, Scooting, Transfer Training, Ambulation on level ground, Sitting balance  and Standing balance to improve functional Activity tolerance, Mobility and Strength.     TREATMENT GRID:   Date:  6/7/22 Date:   Date:     Activity/Exercise Parameters Parameters Parameters   Supine heel slides 5x B     Supine SAQ 5x B                                         AFTER TREATMENT PRECAUTIONS: Alarm Activated, Bed/Chair Locked, Call light within reach, Chair and RN at bedside    INTERDISCIPLINARY COLLABORATION:  RN/ PCT and PT/ PTA    EDUCATION:      TIME IN/OUT:  Time In: 0900  Time Out: 0925  Minutes: 25    Nusrat Torres PTA

## 2022-06-09 NOTE — PROGRESS NOTES
VANCO DAILY FOLLOW UP NOTE  [address] [facility] Pharmacokinetic Monitoring Service - Vancomycin    Consulting Provider: Dr. Candelaria León   Indication: HAP  Target Concentration: Goal AUC/EMMA 400-600 mg*hr/L  Day of Therapy: 2 of 7  Additional Antimicrobials: azithromycin, cefepime    Pertinent Laboratory Values: Wt Readings from Last 1 Encounters:   06/03/22 104 lb (47.2 kg)     Temp Readings from Last 1 Encounters:   06/09/22 97.9 °F (36.6 °C) (Oral)     Recent Labs     06/07/22  0443 06/08/22  0325 06/09/22  0330   BUN 19 26* 36*   CREATININE 0.80 1.00 1.10*   WBC  --  12.3* 24.0*   PROCAL  --   --  6.36*     Estimated Creatinine Clearance: 26 mL/min (A) (based on SCr of 1.1 mg/dL (H)). No results found for: Tone Morris    MRSA Nasal Swab: was ordered by pharmacy, awaiting results.     Assessment:  Date/Time Dose Concentration AUC         Note: Serum concentrations collected for AUC dosing may appear elevated if collected in close proximity to the dose administered, this is not necessarily an indication of toxicity    Plan:  Dosing recommendations based on Bayesian software  Start vancomycin 750 mg q24h following loading dose administered yesterday  Anticipated AUC of 504 and trough concentration of 16.1 at steady state  Renal labs as indicated   Vancomycin concentration ordered for 6/10 @ 0400    Pharmacy will continue to monitor patient and adjust therapy as indicated    Thank you for the consult,  Eric Hawkins, [address] [facility]

## 2022-06-09 NOTE — PROGRESS NOTES
LTG: Patient will tolerate least restrictive diet without overt signs or symptoms of airway compromise. STG: Patient will participate in modified barium swallow study as clinically indicated. SPEECH LANGUAGE PATHOLOGY: DYSPHAGIA  Initial Assessment    NAME: Estela Rascon  : 1933  MRN: [id number]    ADMISSION DATE: 6/3/2022  ADMITTING DIAGNOSIS: has Leukocytosis; Asthma; Aortic valve stenosis; JUAN (dyspnea on exertion); Carotid atherosclerosis, bilateral; Type 2 diabetes mellitus ([address] Hypertension; GI bleed; Traumatic pneumothorax; Acute blood loss anemia (ABLA); Pneumothorax, traumatic; Hypomagnesemia; Hyponatremia; Acute urinary retention; and Aspiration pneumonia (HCC) on their problem list.    ICD-10: Treatment Diagnosis: R13.12 Dysphagia, Oropharyngeal Phase    RECOMMENDATIONS   Diet:  Diet Solids Recommendation: NPO  Liquid Consistency Recommendation: NPO    Medications: Crushed in puree as able -critical meds only     Recommendations: NPO;Modified barium swallow study   Compensatory Swallowing Strategies: (Oral care every 3 hours)   Therapeutic Intervention:Diet tolerance monitoring;Patient/Family education   Patient continues to require skilled intervention: Yes  D/C Recommendations: Ongoing speech therapy is recommended during this hospitalization,Ongoing speech therapy is recommended at next level of care     ASSESSMENT    Dysphagia Diagnosis: Mild to moderate oral stage dysphagia; Moderate to severe pharyngeal stage dysphagia    Patient presents with significant dysphagia. RN reports patient was coughing with lunch meal yesterday and was made NPO. Alert, but confused requiring frequent redirections to task. Immediate coughing with thin liquids presented by cup. No overt s/sx airway compromise with small bites of puree.        GENERAL    History of Present Injury/Illness: Ms. Ephriam Osler  has a past medical history of Asthma, Diabetes ([facility] [address] GERD (gastroesophageal reflux disease), Hip fracture, right ([address] Hypertension, and MVP (mitral valve prolapse). . She also  has a past surgical history that includes other surgical history; orthopedic surgery; and Tubal ligation. Chart Reviewed: Yes  Subjective: Confused and repeatedly asking for red cell phone, which is not in room. \"I'm going to die. \"  Behavior/Cognition: Confused; Distractible;Requires cueing  Communication Observation: Functional  Follows Directions: Simple  Respiratory Status: O2 via nasual cannula              Prior Dysphagia History: RN reports coughing with po yesterday. Was then made NPO  Patient Complaint: \"I can't swallow. \"  Current Diet : NPO  Current Liquid Diet : NPO  Pain:   Patient c/o pain (RN aware)                                Hearing: Exceptions to [facility]    Hearing Exceptions: Hard of hearing/hearing concerns  OBJECTIVE    Patient Positioning: Upright in chair   Oral Motor   Labial: No impairment  Dentition: Upper dentures (lower partial)  Oral Hygiene: Dried secretions  Lingual: No impairment  Velum: No Impairment  Dentition: Dentures top; Some missing teeth        Baseline Vocal Quality: Normal  Oropharyngeal Phase:    Patient consumed trials of thin by teaspoon x4, thin by cup x1, and puree x4. Swallows appear timely with thin liquids; however, immediate weak coughing episode post swallow with thin by cup. No overt s/sx airway compromise with thin by teaspoon across 4 trials and puree across 4 trials.                      PLAN    Duration/Frequency: Continue to follow patient 3x/week for duration of hospitalization and/or until goals met    Dysphagia Outcome and Severity Scale (ANI)  Dysphagia Outcome Severity Scale: Level 2: Moderate Severe dysphagia- Maximum assistance or maximum use of strategies with partial PO only  Interpretation of Tool: The Dysphagia Outcome and Severity Scale (ANI) is a simple, easy-to-use, 7-point scale developed to systematically rate the functional severity of dysphagia based on objective assessment and

## 2022-06-10 ENCOUNTER — APPOINTMENT (OUTPATIENT)
Dept: GENERAL RADIOLOGY | Age: 87
DRG: 200 | End: 2022-06-10
Attending: FAMILY MEDICINE
Payer: MEDICARE

## 2022-06-10 PROBLEM — R00.0 TACHYCARDIA: Status: ACTIVE | Noted: 2022-06-10

## 2022-06-10 LAB
ANION GAP SERPL CALC-SCNC: 12 MMOL/L (ref 7–16)
BACTERIA SPEC CULT: NORMAL
BACTERIA SPEC CULT: NORMAL
BASOPHILS # BLD: 0 K/UL (ref 0–0.2)
BASOPHILS NFR BLD: 0 % (ref 0–2)
BUN SERPL-MCNC: 40 MG/DL (ref 8–23)
CALCIUM SERPL-MCNC: 9 MG/DL (ref 8.3–10.4)
CHLORIDE SERPL-SCNC: 103 MMOL/L (ref 98–107)
CO2 SERPL-SCNC: 19 MMOL/L (ref 21–32)
CREAT SERPL-MCNC: 0.9 MG/DL (ref 0.6–1)
DIFFERENTIAL METHOD BLD: ABNORMAL
EKG ATRIAL RATE: 148 BPM
EKG DIAGNOSIS: NORMAL
EKG P AXIS: 45 DEGREES
EKG P-R INTERVAL: 134 MS
EKG Q-T INTERVAL: 266 MS
EKG QRS DURATION: 62 MS
EKG QTC CALCULATION (BAZETT): 417 MS
EKG R AXIS: 12 DEGREES
EKG T AXIS: 25 DEGREES
EKG VENTRICULAR RATE: 148 BPM
EOSINOPHIL # BLD: 0 K/UL (ref 0–0.8)
EOSINOPHIL NFR BLD: 0 % (ref 0.5–7.8)
ERYTHROCYTE [DISTWIDTH] IN BLOOD BY AUTOMATED COUNT: 16.4 % (ref 11.9–14.6)
ERYTHROCYTE [DISTWIDTH] IN BLOOD BY AUTOMATED COUNT: 16.4 % (ref 11.9–14.6)
GLUCOSE BLD STRIP.AUTO-MCNC: 198 MG/DL (ref 65–100)
GLUCOSE BLD STRIP.AUTO-MCNC: 254 MG/DL (ref 65–100)
GLUCOSE BLD STRIP.AUTO-MCNC: 278 MG/DL (ref 65–100)
GLUCOSE BLD STRIP.AUTO-MCNC: 282 MG/DL (ref 65–100)
GLUCOSE BLD STRIP.AUTO-MCNC: 318 MG/DL (ref 65–100)
GLUCOSE SERPL-MCNC: 234 MG/DL (ref 65–100)
HCT VFR BLD AUTO: 20.8 % (ref 35.8–46.3)
HCT VFR BLD AUTO: 27.7 % (ref 35.8–46.3)
HGB BLD-MCNC: 6.6 G/DL (ref 11.7–15.4)
HGB BLD-MCNC: 9.1 G/DL (ref 11.7–15.4)
HISTORY CHECK: NORMAL
IMM GRANULOCYTES # BLD AUTO: 0.2 K/UL (ref 0–0.5)
IMM GRANULOCYTES NFR BLD AUTO: 1 % (ref 0–5)
KAPPA LC FREE SER-MCNC: 19.9 MG/L (ref 3.3–19.4)
KAPPA LC FREE/LAMBDA FREE SER: 1.24 {RATIO} (ref 0.26–1.65)
LAMBDA LC FREE SERPL-MCNC: 16 MG/L (ref 5.7–26.3)
LYMPHOCYTES # BLD: 0.5 K/UL (ref 0.5–4.6)
LYMPHOCYTES NFR BLD: 2 % (ref 13–44)
MCH RBC QN AUTO: 24.9 PG (ref 26.1–32.9)
MCH RBC QN AUTO: 24.9 PG (ref 26.1–32.9)
MCHC RBC AUTO-ENTMCNC: 31.7 G/DL (ref 31.4–35)
MCHC RBC AUTO-ENTMCNC: 32.9 G/DL (ref 31.4–35)
MCV RBC AUTO: 75.9 FL (ref 79.6–97.8)
MCV RBC AUTO: 78.5 FL (ref 79.6–97.8)
MONOCYTES # BLD: 1.3 K/UL (ref 0.1–1.3)
MONOCYTES NFR BLD: 6 % (ref 4–12)
NEUTS SEG # BLD: 19.3 K/UL (ref 1.7–8.2)
NEUTS SEG NFR BLD: 91 % (ref 43–78)
NRBC # BLD: 0 K/UL (ref 0–0.2)
NRBC # BLD: 0.02 K/UL (ref 0–0.2)
PLATELET # BLD AUTO: 402 K/UL (ref 150–450)
PLATELET # BLD AUTO: 451 K/UL (ref 150–450)
PMV BLD AUTO: 10.1 FL (ref 9.4–12.3)
PMV BLD AUTO: 9.7 FL (ref 9.4–12.3)
POTASSIUM SERPL-SCNC: 4.1 MMOL/L (ref 3.5–5.1)
RBC # BLD AUTO: 2.65 M/UL (ref 4.05–5.2)
RBC # BLD AUTO: 3.65 M/UL (ref 4.05–5.2)
SARS-COV-2 RDRP RESP QL NAA+PROBE: NOT DETECTED
SERVICE CMNT-IMP: ABNORMAL
SERVICE CMNT-IMP: NORMAL
SODIUM SERPL-SCNC: 134 MMOL/L (ref 136–145)
SOURCE: NORMAL
VANCOMYCIN SERPL-MCNC: 19.2 UG/ML
WBC # BLD AUTO: 21.1 K/UL (ref 4.3–11.1)
WBC # BLD AUTO: 21.3 K/UL (ref 4.3–11.1)

## 2022-06-10 PROCEDURE — 51702 INSERT TEMP BLADDER CATH: CPT

## 2022-06-10 PROCEDURE — 2500000003 HC RX 250 WO HCPCS: Performed by: STUDENT IN AN ORGANIZED HEALTH CARE EDUCATION/TRAINING PROGRAM

## 2022-06-10 PROCEDURE — 97530 THERAPEUTIC ACTIVITIES: CPT

## 2022-06-10 PROCEDURE — 87040 BLOOD CULTURE FOR BACTERIA: CPT

## 2022-06-10 PROCEDURE — 36430 TRANSFUSION BLD/BLD COMPNT: CPT

## 2022-06-10 PROCEDURE — 2580000003 HC RX 258: Performed by: STUDENT IN AN ORGANIZED HEALTH CARE EDUCATION/TRAINING PROGRAM

## 2022-06-10 PROCEDURE — 85027 COMPLETE CBC AUTOMATED: CPT

## 2022-06-10 PROCEDURE — 6360000002 HC RX W HCPCS: Performed by: INTERNAL MEDICINE

## 2022-06-10 PROCEDURE — 6360000002 HC RX W HCPCS: Performed by: STUDENT IN AN ORGANIZED HEALTH CARE EDUCATION/TRAINING PROGRAM

## 2022-06-10 PROCEDURE — 80202 ASSAY OF VANCOMYCIN: CPT

## 2022-06-10 PROCEDURE — 2580000003 HC RX 258: Performed by: INTERNAL MEDICINE

## 2022-06-10 PROCEDURE — 93005 ELECTROCARDIOGRAM TRACING: CPT | Performed by: PHYSICIAN ASSISTANT

## 2022-06-10 PROCEDURE — 6370000000 HC RX 637 (ALT 250 FOR IP): Performed by: STUDENT IN AN ORGANIZED HEALTH CARE EDUCATION/TRAINING PROGRAM

## 2022-06-10 PROCEDURE — 6370000000 HC RX 637 (ALT 250 FOR IP): Performed by: INTERNAL MEDICINE

## 2022-06-10 PROCEDURE — 6370000000 HC RX 637 (ALT 250 FOR IP): Performed by: PHYSICIAN ASSISTANT

## 2022-06-10 PROCEDURE — 36415 COLL VENOUS BLD VENIPUNCTURE: CPT

## 2022-06-10 PROCEDURE — 99233 SBSQ HOSP IP/OBS HIGH 50: CPT | Performed by: INTERNAL MEDICINE

## 2022-06-10 PROCEDURE — A4216 STERILE WATER/SALINE, 10 ML: HCPCS | Performed by: STUDENT IN AN ORGANIZED HEALTH CARE EDUCATION/TRAINING PROGRAM

## 2022-06-10 PROCEDURE — 2500000003 HC RX 250 WO HCPCS: Performed by: PHYSICIAN ASSISTANT

## 2022-06-10 PROCEDURE — C9113 INJ PANTOPRAZOLE SODIUM, VIA: HCPCS | Performed by: STUDENT IN AN ORGANIZED HEALTH CARE EDUCATION/TRAINING PROGRAM

## 2022-06-10 PROCEDURE — 6360000002 HC RX W HCPCS: Performed by: PHYSICIAN ASSISTANT

## 2022-06-10 PROCEDURE — 71045 X-RAY EXAM CHEST 1 VIEW: CPT

## 2022-06-10 PROCEDURE — 80048 BASIC METABOLIC PNL TOTAL CA: CPT

## 2022-06-10 PROCEDURE — 87635 SARS-COV-2 COVID-19 AMP PRB: CPT

## 2022-06-10 PROCEDURE — 82962 GLUCOSE BLOOD TEST: CPT

## 2022-06-10 PROCEDURE — 1100000000 HC RM PRIVATE

## 2022-06-10 PROCEDURE — 74018 RADEX ABDOMEN 1 VIEW: CPT

## 2022-06-10 PROCEDURE — 85025 COMPLETE CBC W/AUTO DIFF WBC: CPT

## 2022-06-10 PROCEDURE — P9016 RBC LEUKOCYTES REDUCED: HCPCS

## 2022-06-10 RX ORDER — METOPROLOL TARTRATE 5 MG/5ML
5 INJECTION INTRAVENOUS ONCE
Status: COMPLETED | OUTPATIENT
Start: 2022-06-10 | End: 2022-06-10

## 2022-06-10 RX ORDER — DOCUSATE SODIUM 100 MG/1
100 CAPSULE, LIQUID FILLED ORAL 2 TIMES DAILY
Status: DISCONTINUED | OUTPATIENT
Start: 2022-06-10 | End: 2022-06-13 | Stop reason: HOSPADM

## 2022-06-10 RX ORDER — FUROSEMIDE 10 MG/ML
40 INJECTION INTRAMUSCULAR; INTRAVENOUS ONCE
Status: COMPLETED | OUTPATIENT
Start: 2022-06-10 | End: 2022-06-10

## 2022-06-10 RX ORDER — INSULIN LISPRO 100 [IU]/ML
0-8 INJECTION, SOLUTION INTRAVENOUS; SUBCUTANEOUS
Status: DISCONTINUED | OUTPATIENT
Start: 2022-06-10 | End: 2022-06-13 | Stop reason: HOSPADM

## 2022-06-10 RX ADMIN — DOCUSATE SODIUM 100 MG: 100 CAPSULE, LIQUID FILLED ORAL at 21:19

## 2022-06-10 RX ADMIN — INSULIN LISPRO 4 UNITS: 100 INJECTION, SOLUTION INTRAVENOUS; SUBCUTANEOUS at 11:49

## 2022-06-10 RX ADMIN — AZITHROMYCIN MONOHYDRATE 500 MG: 500 INJECTION, POWDER, LYOPHILIZED, FOR SOLUTION INTRAVENOUS at 21:18

## 2022-06-10 RX ADMIN — METOPROLOL SUCCINATE 50 MG: 50 TABLET, EXTENDED RELEASE ORAL at 14:11

## 2022-06-10 RX ADMIN — DILTIAZEM HYDROCHLORIDE 30 MG: 30 TABLET, FILM COATED ORAL at 21:19

## 2022-06-10 RX ADMIN — PREDNISONE 10 MG: 20 TABLET ORAL at 09:08

## 2022-06-10 RX ADMIN — METOPROLOL TARTRATE 5 MG: 5 INJECTION INTRAVENOUS at 14:19

## 2022-06-10 RX ADMIN — TUBERCULIN PURIFIED PROTEIN DERIVATIVE 5 UNITS: 5 INJECTION, SOLUTION INTRADERMAL at 15:55

## 2022-06-10 RX ADMIN — FUROSEMIDE 40 MG: 10 INJECTION, SOLUTION INTRAMUSCULAR; INTRAVENOUS at 09:09

## 2022-06-10 RX ADMIN — METOPROLOL TARTRATE 2.5 MG: 5 INJECTION INTRAVENOUS at 06:15

## 2022-06-10 RX ADMIN — INSULIN GLARGINE 5 UNITS: 100 INJECTION, SOLUTION SUBCUTANEOUS at 21:16

## 2022-06-10 RX ADMIN — SODIUM CHLORIDE 40 MG: 9 INJECTION, SOLUTION INTRAMUSCULAR; INTRAVENOUS; SUBCUTANEOUS at 09:09

## 2022-06-10 RX ADMIN — INSULIN LISPRO 4 UNITS: 100 INJECTION, SOLUTION INTRAVENOUS; SUBCUTANEOUS at 00:32

## 2022-06-10 RX ADMIN — SODIUM CHLORIDE: 9 INJECTION, SOLUTION INTRAVENOUS at 00:23

## 2022-06-10 RX ADMIN — INSULIN LISPRO 4 UNITS: 100 INJECTION, SOLUTION INTRAVENOUS; SUBCUTANEOUS at 21:16

## 2022-06-10 RX ADMIN — DILTIAZEM HYDROCHLORIDE 30 MG: 30 TABLET, FILM COATED ORAL at 15:53

## 2022-06-10 RX ADMIN — ACETAMINOPHEN 650 MG: 325 TABLET ORAL at 02:45

## 2022-06-10 RX ADMIN — VANCOMYCIN HYDROCHLORIDE 750 MG: 750 INJECTION, POWDER, LYOPHILIZED, FOR SOLUTION INTRAVENOUS at 21:18

## 2022-06-10 RX ADMIN — INSULIN LISPRO 6 UNITS: 100 INJECTION, SOLUTION INTRAVENOUS; SUBCUTANEOUS at 16:53

## 2022-06-10 RX ADMIN — CEFEPIME HYDROCHLORIDE 1000 MG: 1 INJECTION, POWDER, FOR SOLUTION INTRAMUSCULAR; INTRAVENOUS at 23:30

## 2022-06-10 RX ADMIN — CEFEPIME HYDROCHLORIDE 1000 MG: 1 INJECTION, POWDER, FOR SOLUTION INTRAMUSCULAR; INTRAVENOUS at 11:38

## 2022-06-10 RX ADMIN — ACETAMINOPHEN 650 MG: 325 TABLET ORAL at 21:19

## 2022-06-10 ASSESSMENT — PAIN DESCRIPTION - LOCATION
LOCATION: GENERALIZED
LOCATION: BACK
LOCATION: RIB CAGE

## 2022-06-10 ASSESSMENT — PAIN DESCRIPTION - DESCRIPTORS
DESCRIPTORS: ACHING

## 2022-06-10 ASSESSMENT — PAIN SCALES - WONG BAKER
WONGBAKER_NUMERICALRESPONSE: 4
WONGBAKER_NUMERICALRESPONSE: 6
WONGBAKER_NUMERICALRESPONSE: 6

## 2022-06-10 ASSESSMENT — PAIN DESCRIPTION - ORIENTATION
ORIENTATION: LEFT
ORIENTATION: RIGHT;LEFT

## 2022-06-10 ASSESSMENT — PAIN SCALES - GENERAL
PAINLEVEL_OUTOF10: 10
PAINLEVEL_OUTOF10: 8
PAINLEVEL_OUTOF10: 10
PAINLEVEL_OUTOF10: 0

## 2022-06-10 NOTE — FLOWSHEET NOTE
Blood transfusion of 1 unit PRBCs complete. Pt tolerated well. No signs of distress noted. Will continue to monitor.

## 2022-06-10 NOTE — PROGRESS NOTES
Roney Martin  Admission Date: 6/3/2022         [facility] Nephrology Progress Note: 6/10/2022    Follow-up for: MARU, HypoNa, HyperK    The patient's chart is reviewed and the patient is discussed with the staff. Subjective:   Pt seen and examined in room reclining in chair, following commands with confusion to situation, remains in mitts and denies pain.      ROS:  Gen - no fever, no chills, appetite poor  CV - no chest pain, no palpitation  Lung - + exertional shortness of breath, no cough  Abd - no tenderness, no nausea/vomiting, no diarrhea  Ext - no edema    Current Facility-Administered Medications   Medication Dose Route Frequency    furosemide (LASIX) injection 40 mg  40 mg IntraVENous Once    cefepime (MAXIPIME) 1000 mg IVPB minibag in NS  1,000 mg IntraVENous Q12H    metoprolol (LOPRESSOR) injection 2.5 mg  2.5 mg IntraVENous Q6H    pantoprazole (PROTONIX) 40 mg in sodium chloride (PF) 10 mL injection  40 mg IntraVENous Daily    predniSONE (DELTASONE) tablet 10 mg  10 mg Oral Every Other Day    insulin lispro (HUMALOG) injection vial 0-8 Units  0-8 Units SubCUTAneous Q6H    lidocaine 4 % external patch 2 patch  2 patch TransDERmal Daily    0.9 % sodium chloride infusion   IntraVENous PRN    [Held by provider] 0.9 % sodium chloride infusion   IntraVENous Continuous    azithromycin (ZITHROMAX) 500 mg in sodium chloride 0.9 % 250 mL IVPB ([id number])  500 mg IntraVENous Q24H    naloxone (NARCAN) injection 0.4 mg  0.4 mg IntraVENous PRN    oxyCODONE (ROXICODONE) immediate release tablet 5 mg  5 mg Oral Q8H PRN    vancomycin (VANCOCIN) 750 mg in sodium chloride 0.9 % 250 mL IVPB ([id number])  750 mg IntraVENous Q24H    [Held by provider] amLODIPine (NORVASC) tablet 5 mg  5 mg Oral Daily    [Held by provider] docusate sodium (COLACE) capsule 100 mg  100 mg Oral Daily    [Held by provider] ferrous sulfate (IRON 325) tablet 325 mg  325 mg Oral Daily with breakfast    insulin glargine (LANTUS) injection vial 5 Units  5 Units SubCUTAneous Nightly    [Held by provider] metoprolol succinate (TOPROL XL) extended release tablet 50 mg  50 mg Oral Daily    [Held by provider] pantoprazole (PROTONIX) tablet 40 mg  40 mg Oral QAM AC    0.9 % sodium chloride infusion   IntraVENous PRN    acetaminophen (TYLENOL) tablet 650 mg  650 mg Oral Q6H PRN    Or    acetaminophen (TYLENOL) suppository 650 mg  650 mg Rectal Q6H PRN    ondansetron (ZOFRAN-ODT) disintegrating tablet 4 mg  4 mg Oral Q8H PRN    Or    ondansetron (ZOFRAN) injection 4 mg  4 mg IntraVENous Q6H PRN    polyethylene glycol (GLYCOLAX) packet 17 g  17 g Oral Daily PRN    albuterol sulfate  (90 Base) MCG/ACT inhaler 2 puff  2 puff Inhalation Q4H PRN    [Held by provider] Vitamin D (CHOLECALCIFEROL) tablet 5,000 Units  5,000 Units Oral Daily    glucose chewable tablet 16 g  4 tablet Oral PRN    dextrose bolus 10% 125 mL  125 mL IntraVENous PRN    Or    dextrose bolus 10% 250 mL  250 mL IntraVENous PRN    glucagon injection 1 mg  1 mg IntraMUSCular PRN    dextrose 5 % solution  100 mL/hr IntraVENous PRN         Objective:     Vitals:    06/10/22 0309 06/10/22 0413 06/10/22 0517 06/10/22 0720   BP: (!) 150/60  (!) 148/83 (!) 145/75   Pulse: (!) 115 (!) 106 (!) 116 (!) 111   Resp: 18  20 20   Temp: 98.1 °F (36.7 °C)  97.5 °F (36.4 °C) 97.5 °F (36.4 °C)   TempSrc: Oral  Oral Axillary   SpO2: 97%  96% 96%   Weight:       Height:         Intake and Output:   06/08 1901 - 06/10 0700  In: 2121.7 [I.V.:1555]  Out: 1355 [Urine:1355]  No intake/output data recorded.     Physical Exam:   Constitutional:  the patient is well developed and in no acute distress, alert and oriented to self, following commands, confusion to situation  HEENT:  Sclera clear, pupils equal, oral mucosa moist  Lungs: clear low lung volume bilaterally   Cardiovascular:  Regular rate, S1, S2, no Rub   Abd/GI: soft and non-tender; with positive bowel

## 2022-06-10 NOTE — PROGRESS NOTES
LTG: Patient will tolerate least restrictive diet without overt signs or symptoms of airway compromise. STG: Patient will participate in modified barium swallow study as clinically indicated. Discontinued 6/10  STG: Patient will tolerate puree diet and thin liquids without overt s/sx airway compromise. Added 6/10  STG: Patient will participate in ongoing chewable trials with speech therapy only for potential diet advancement. Added 6/10    SPEECH LANGUAGE PATHOLOGY: DYSPHAGIA  Initial Assessment    NAME: Guy Alanis  : 1933  MRN: [id number]    ADMISSION DATE: 6/3/2022  ADMITTING DIAGNOSIS: has Leukocytosis; Asthma; Aortic valve stenosis; JUAN (dyspnea on exertion); Carotid atherosclerosis, bilateral; Type 2 diabetes mellitus ([facility] [address] Hypertension; GI bleed; Traumatic pneumothorax; Acute blood loss anemia (ABLA); Pneumothorax, traumatic; Hypomagnesemia; Hyponatremia; Acute urinary retention; and Aspiration pneumonia (HCC) on their problem list.    ICD-10: Treatment Diagnosis: R13.12 Dysphagia, Oropharyngeal Phase    RECOMMENDATIONS   Diet: With 1:1 assistance  Diet Solids Recommendation: Pureed  Liquid Consistency Recommendation: Thin by single straw sips    Medications: Crushed in puree as able -critical meds only     Recommendations: Assistance with meals   Compensatory Swallowing Strategies:Small bites/sips;Upright as possible for all oral intake;Eat/Feed slowly; Assist feed   Therapeutic Intervention:Diet tolerance monitoring;Patient/Family education   Patient continues to require skilled intervention: Yes  D/C Recommendations: Ongoing speech therapy is recommended during this hospitalization,Ongoing speech therapy is recommended at next level of care     ASSESSMENT    Dysphagia Diagnosis: Mild oral stage dysphagia    Patient unable to come down for modified barium swallow study due to critical hemoglobin; therefore, seen at bedside. Patient with improved mentation and swallow function this date. Exhibited mild oral holding with thin liquids; however, no overt s/sx airway compromise occurred with ~ 6 oz thin by single straw sips or 4 oz of puree. Patient declined chewable trials at this time. GENERAL    History of Present Injury/Illness: Ms. Sridevi Gray  has a past medical history of Asthma, Diabetes ([address] GERD (gastroesophageal reflux disease), Hip fracture, right ([address] Hypertension, and MVP (mitral valve prolapse). . She also  has a past surgical history that includes other surgical history; orthopedic surgery; and Tubal ligation. Chart Reviewed: Yes  Subjective: Less restless with improved attention to task. \"Please just let me die. \"  Behavior/Cognition: Alert; Cooperative  Communication Observation: Functional  Follows Directions: Simple  Respiratory Status: Room air              Prior Dysphagia History: RN reports coughing with po yesterday. Was then made NPO  Patient Complaint: I just had trouble swallowing yesterday  Current Diet : NPO  Current Liquid Diet : NPO  Pain:   Patient c/o pain (nurse aware)                                Hearing: Exceptions to [facility]    Hearing Exceptions: Hard of hearing/hearing concerns  OBJECTIVE                       Oropharyngeal Phase:    Patient alert upright in chair. No longer requiring redirection to task and is able to follow basic commands. Consumed thin by teaspoon x4, ~6 oz thin by single straw sips, and ~ 4 oz of puree. Mild oral holding and piecemeal swallow with thin liquids. Functional oral prep with puree. No overt s/sx airway compromise occurred with thin liquids or puree trials. Patient refused all chewables. Vocal Quality: No Impairment              PLAN    Duration/Frequency: Continue to follow patient 3x/week for duration of hospitalization and/or until goals met    Dysphagia Outcome and Severity Scale (ANI)  Dysphagia Outcome Severity Scale: Level 4: Mild moderate dysphagia- Intermittent supervision/cueing.  One - two diet consistencies restricted  Interpretation of Tool: The Dysphagia Outcome and Severity Scale (ANI) is a simple, easy-to-use, 7-point scale developed to systematically rate the functional severity of dysphagia based on objective assessment and make recommendations for diet level, independence level, and type of nutrition. Normal(7), Functional(6), Mild(5), Mild-Moderate(4), Moderate(3), Moderate-Severe(2), Severe(1)    Speech Therapy Prognosis  Prognosis: Good  Prognosis Considerations: Age; Co-Morbidities; Potential    Education: Patient,RN,NP  Patient Education: dysphagia diet, aspiration precautions  Patient Education Response: Verbalizes understanding,Needs reinforcement    Current Medications:   No current facility-administered medications on file prior to encounter.      Current Outpatient Medications on File Prior to Encounter   Medication Sig Dispense Refill    Multiple Vitamin (MULTIVITAMIN PO) Take 1 tablet by mouth daily      acetaminophen (TYLENOL) 325 MG tablet Take 650 mg by mouth every 8 hours      albuterol sulfate  (90 Base) MCG/ACT inhaler Inhale 2 puffs into the lungs every 4 hours as needed      amLODIPine (NORVASC) 5 MG tablet Take 5 mg by mouth daily      aspirin 81 MG chewable tablet Take 81 mg by mouth daily      vitamin D3 (CHOLECALCIFEROL) 125 MCG (5000 UT) TABS tablet Take 5,000 Units by mouth daily      Dulaglutide (TRULICITY) 0.95 XP/5.8TN SOPN Inject 0.75 mg into the skin every 7 days      predniSONE (DELTASONE) 10 MG tablet Take 10 mg by mouth every other day         PRECAUTIONS/ALLERGIES: Morphine and related, Oxycodone, Penicillins, Lidocaine, Statins, Hydrocodone-acetaminophen, and Meperidine   Safety Devices in place: Yes  Type of devices: Nurse notified,Left in chair  Restraints Initially in Place: Yes  Restraints: hand mitts    Therapy Time  SLP Individual Minutes  Time In: 0840  Time Out: 6341  Minutes: [address] [facility], [address]  6/10/2022 9:19 AM

## 2022-06-10 NOTE — PROGRESS NOTES
VANCO DAILY FOLLOW UP NOTE  [address] [facility] Pharmacokinetic Monitoring Service - Vancomycin    Consulting Provider: Dr. Gracie Ortiz   Indication: HAP  Target Concentration: Goal AUC/EMMA 400-600 mg*hr/L  Day of Therapy: 3 of 7  Additional Antimicrobials: azithromycin, cefepime    Pertinent Laboratory Values: Wt Readings from Last 1 Encounters:   06/03/22 104 lb (47.2 kg)     Temp Readings from Last 1 Encounters:   06/10/22 97.5 °F (36.4 °C) (Axillary)     Recent Labs     06/08/22  0325 06/09/22  0330 06/10/22  0006   BUN 26* 36* 40*   CREATININE 1.00 1.10* 0.90   WBC 12.3* 24.0* 21.1*   PROCAL  --  6.36*  --      Estimated Creatinine Clearance: 32 mL/min (based on SCr of 0.9 mg/dL). Lab Results   Component Value Date    VANCORANDOM 19.2 06/10/2022       MRSA Nasal Swab: was ordered by pharmacy, awaiting results.     Assessment:  Date/Time Dose Concentration AUC   6/10 0006 750 mg q24h 19.2 464   Note: Serum concentrations collected for AUC dosing may appear elevated if collected in close proximity to the dose administered, this is not necessarily an indication of toxicity    Plan:  Current dosing regimen is therapeutic  Continue current dose of 750 mg every 24 hours for now  Repeat vancomycin concentrations will be ordered as clinically appropriate   Pharmacy will continue to monitor patient and adjust therapy as indicated    Thank you for the consult,  Debbie Oliva, [facility]

## 2022-06-10 NOTE — PROGRESS NOTES
Blood transfusion of 1 unit PRBCs started at 0255 for Hgb 6.7. Pt appears to be tolerating well. No sign of transfusion reaction noted and no signs of distress at this time. Will continue to monitor.

## 2022-06-10 NOTE — PROGRESS NOTES
SPEECH PATHOLOGY NOTE:      Modified barium swallow study scheduled for this AM. Per RN patient with low hemoglobin awaiting stat blood work. Unable to leave floor for study at this time. Will see patient at bedside and plan to reschedule MBSS for later date if indicated.          Ramona Hernandez MS, CCC-SLP

## 2022-06-10 NOTE — PROGRESS NOTES
Physician Progress Note      Domenica Mallory  CSN #:                  [id number]  :                       1933  ADMIT DATE:       6/3/2022 4:48 PM  [address] [place] DATE:  RESPONDING  PROVIDER #:        NICOLE RUSS        QUERY TEXT:    Type of Encephalopathy: Please provide further specificity, if known. Clinical indicators include:  Options provided:  -- Anoxic/hypoxic encephalopathy  -- Metabolic encephalopathy  -- Toxic encephalopathy  -- Hepatic encephalopathy  -- Hypertensive encephalopathy  -- Other - I will add my own diagnosis  -- Disagree - Not applicable / Not valid  -- Disagree - Clinically Unable to determine / Unknown        PROVIDER RESPONSE TEXT:    The patient has metabolic encephalopathy.       Electronically signed by:  Ladoris Schirmer PA 6/10/2022 11:26 AM

## 2022-06-10 NOTE — PROGRESS NOTES
Pt resting in recliner; A&O x 2 to person and place. Respirations even and unlabored on room air. No further needs reported and no signs of distress noted at this time. NS currently infusing at 75 mL/h. Safety measures are in place. Will give report to oncoming RN.

## 2022-06-10 NOTE — PROGRESS NOTES
PHYSICAL THERAPY Daily Note and AM  (Link to Caseload Tracking: PT Visit Days : 4  Time In/Out PT Charge Capture  Rehab Caseload Tracker  Orders      Sonia Sauer is a 80 y.o. female   PRIMARY DIAGNOSIS: Pneumothorax, traumatic  Traumatic pneumothorax, initial encounter [S27. 0XXA]       Inpatient: Payor: Tono Cadena / Plan: Savana Evans PPO / Product Type: Medicare /     ASSESSMENT:     REHAB RECOMMENDATIONS:   Recommendation to date pending progress:  Setting:   Short-term Rehab    Equipment:     To Be Determined     ASSESSMENT:  Ms. Mancini sitting up in the recliner and very confused with mitts. She did follow directions to perform some leg exercises following my lead. She stood 1 time from the chair with min/mod A. Confusion making mobility difficult.      SUBJECTIVE:   Ms. Carmenza Marlow states, \"I am dying\"     Social/Functional Lives With: Family  Type of Home: House  Home Layout: Two level,Bed/Bath upstairs  Home Access: Level entry  Bathroom Shower/Tub: Walk-in shower  Bathroom Toilet: Handicap height  Bathroom Equipment: Shower chair,Hand-held shower  Bathroom Accessibility: Accessible  Home Equipment: Rollator,Grab bars  Has the patient had two or more falls in the past year or any fall with injury in the past year?: No  Receives Help From: Family  ADL Assistance: [address]: Independent  Homemaking Responsibilities: Yes  Laundry Responsibility: Primary  Cleaning Responsibility: Primary  Bill Paying/Finance Responsibility: Primary  Shopping Responsibility: Primary  Dependent Care Responsibility: Primary  Health Care Management: Primary  Ambulation Assistance: Independent  Transfer Assistance: Independent  Active : No  Patient's  Info: [facility]  Mode of Transportation: Car  Occupation: Retired  OBJECTIVE:     PAIN: Carroll Ivanoff / O2: PRECAUTION / Richardson Party / Luis Alberto Domingoo:   Pre Treatment:          Post Treatment:none mentioned Vitals        Oxygen least restrictive device within 7 treatment day(s). (4.)Ms. Maximo Schirmer will tolerate at least 23 min of dynamic standing activity to assist standing ADLs with the least restrictive device within 7 treatment days. (5.)Ms. Maximo Schirmer will climb at least 16 steps with MODERATE ASSIST with the least restrictive device within 7 treatment days. FREQUENCY AND DURATION: 3 times/week for duration of hospital stay or until stated goals are met, whichever comes first.    TREATMENT:   TREATMENT:   Therapeutic Activity (15 Minutes): Therapeutic activity included Transfer Training and Standing balance to improve functional Activity tolerance, Mobility, Strength and ROM.     TREATMENT GRID:   Date:  6/7/22 Date:  6/10/22 Date:     Activity/Exercise Parameters Parameters Parameters   Supine heel slides 5x B     Supine SAQ 5x B     Seated TKE 15x B     Seated AP 10x B     Seated marching Struggled with directions     Seated hip abd 10x B                 AFTER TREATMENT PRECAUTIONS: Alarm Activated, Bed/Chair Locked, Call light within reach, Chair, Needs within reach, Restraints  and RN notified    INTERDISCIPLINARY COLLABORATION:  RN/ PCT and PT/ PTA    EDUCATION:      TIME IN/OUT:  Time In: 1125  Time Out: 1140  Minutes: 15    Constantine Osuna PTA

## 2022-06-10 NOTE — CARE COORDINATION
MSN, CM:  Patient with Hgb at 6.6 this AM.  I unit of PRBC given. NPO for SLP evaluation for aspiration today. Patient's discharge plan is [facility] when medically stable. Case Management will continue to follow.

## 2022-06-10 NOTE — FLOWSHEET NOTE
Patient on room air. IVF infusing at bedside. Merritt cath to gravity. Safety measures noted. Will continue to monitor per policy.      06/10/22 0714   Handoff   Communication Given Shift Handoff   Handoff Received From PAM Peck   Handoff Communication Face to Face

## 2022-06-10 NOTE — PROGRESS NOTES
Hospitalist Progress Note   Admit Date:  6/3/2022  4:48 PM   Name:  Marcy Crockett   Age:  80 y.o. Sex:  female  :  1933   MRN:  [id number]   Room:  3/    Presenting Complaint: No chief complaint on file. Reason(s) for Admission: Traumatic pneumothorax, initial encounter [S27. Andrew Course & Interval History: Marcy Crockett is a 80 y.o. female with medical history of HTN, DM2 who presented to the [facility] ED after a fall. Patient reports becoming dizzy in her bathroom and falling. She denies any LOC or head injury. Patient has had L sided back pain since then. Upon ER evaluation, CXR shows a \"small left apical pneumothorax\" and \"very small left pleural effusion. \"  Additionally, patient's hgb is 7.0. Hemoccult was obtained which was positive. Hospitalist asked to admit. Subsequent cxray revealed worsening / enlarging PTX; pulmonary team requested for pt to be transferred to [facility] for further mgmt. Subjective/24hr Events (06/10/22): \"I went to [facility] but I think I'm dying. \"  Pleasantly confused 88y. o. WF sitting up in chair admitting to generalized malaise. Unable to fully review systems with this altered (probable underlying dementia) patient.       Assessment & Plan:     Principal Problem:    Pneumothorax, traumatic  - resolved; no PTX on cxray today    Active Problems:    Aspiration pneumonia ([address]  - appreciate speech eval, cleared for pureed diet today as mentation improved  - aspiration precautions  - cont empiric cefepime / zithromax / vanco  - de-escalate abx regimen next 24hrs if clinically stable / improves      Acute hypoxic respiratory failure (resolved)  - required 15L NRB -, suspected secondary to aspiration and some degree of volume overload  - currently weaned off supplemental O2; monitor  - s/p lasix 40mg IV this AM (post blood transfusion), iVF held      Anemia / hem + stools  - No active bleed per GI 6/3/2022, no need for EGD  - repeat transfusion past 24hrs, repeated hgb this AM 9.1, no gross s/sx of bleeding  - monitor      PAF / sinus tachycardia  - cont BB  - appreciate cardiology assistance      MARU / hyponatremia / hyperkalemia  - appreciate nephrology ongoing assistance  - renal fxn improved (creatinine 0.90 this AM)  - electrolyte abnls improved      Anti-phospholipid antibody syndrome  - had followed with rheum outpatient  - on chronic prednisone 10mg daily      Aortic stenosis  - aware      HTN, uncontrolled  - metoprolol / norvasc resumed  - monitor      Constipation  - colace resumed; increase frequency to bid  - check KUB       Metabolic encephalopathy  - probable some underlying dementia  - worsened with acute hypoxic resp failure / aspiration PNA      Diabetes mellitus  - sliding scale coverage as needed  - cont lantus 5 units qhs      Debility  - appreciate PT ongoing assistance, STR on dc        Discharge Planning:    - pending clinical course    Diet:  ADULT DIET; Dysphagia - Pureed; 1:1 assistance with all meals  DVT PPx: SCD  Code status: DNR    Hospital Problems:  Principal Problem:    Pneumothorax, traumatic  Active Problems:    Aspiration pneumonia (HCC)    GI bleed    Traumatic pneumothorax    Acute blood loss anemia (ABLA)    Hypomagnesemia    Hyponatremia    Acute urinary retention    Tachycardia    Leukocytosis    Aortic valve stenosis    Type 2 diabetes mellitus ([facility]    Hypertension  Resolved Problems:    * No resolved hospital problems.  *      Objective:     Patient Vitals for the past 24 hrs:   Temp Pulse Resp BP SpO2   06/10/22 1101 97.6 °F (36.4 °C) (!) 119 20 (!) 163/75 96 %   06/10/22 0720 97.5 °F (36.4 °C) (!) 111 20 (!) 145/75 96 %   06/10/22 0517 97.5 °F (36.4 °C) (!) 116 20 (!) 148/83 96 %   06/10/22 0413 -- (!) 106 -- -- --   06/10/22 0309 98.1 °F (36.7 °C) (!) 115 18 (!) 150/60 97 %   06/10/22 0254 97.5 °F (36.4 °C) (!) 115 18 (!) 152/70 96 %   06/10/22 0134 -- (!) 119 -- -- --   06/09/22 2241 97.4 °F (36.3 °C) 99 20 130/60 99 %   06/09/22 2149 -- (!) 113 -- 135/65 --   06/09/22 2016 -- (!) 112 -- -- --   06/09/22 1956 97.3 °F (36.3 °C) (!) 114 20 121/69 97 %   06/09/22 1712 -- 99 -- -- --   06/09/22 1508 98.8 °F (37.1 °C) (!) 114 19 (!) 144/64 96 %   06/09/22 1345 -- 97 -- -- 94 %   06/09/22 1330 -- (!) 156 -- -- --   06/09/22 1300 -- (!) 156 -- -- --   06/09/22 1257 -- (!) 111 -- (!) 149/67 94 %   06/09/22 1200 -- (!) 111 -- -- 95 %       Oxygen Therapy  SpO2: 96 %  O2 Device: None (Room air)  Skin Assessment: Clean, dry, & intact  O2 Flow Rate (L/min): 0 L/min    Estimated body mass index is 17.85 kg/m² as calculated from the following:    Height as of this encounter: 5' 4\" (1.626 m). Weight as of this encounter: 104 lb (47.2 kg). Intake/Output Summary (Last 24 hours) at 6/10/2022 1149  Last data filed at 6/10/2022 1000  Gross per 24 hour   Intake 612.5 ml   Output 1075 ml   Net -462.5 ml         Physical Exam:     Blood pressure (!) 163/75, pulse (!) 119, temperature 97.6 °F (36.4 °C), temperature source Axillary, resp. rate 20, height 5' 4\" (1.626 m), weight 104 lb (47.2 kg), SpO2 96 %. General:    Well developed, thin. Head:  Normocephalic, atraumatic  Eyes:  Sclerae appear normal.  Pupils equally round. ENT:  Nares appear normal, no drainage. Moist oral mucosa  Neck:  No restricted ROM. Trachea midline   CV:   Tachycardic rate, rhythm ~reg, + murmur  Lungs:   Decreased breath sounds b/l; no wheezing / accessory muscles used  Abdomen:   Hypoactive BSx4  Extremities: + mvmt x 4  Skin:     No rashes and normal coloration. Warm and dry. Neuro:  A&Ox2  Psych:  Normal mood and affect.       I have reviewed ordered lab tests and independently visualized imaging below:    Recent Labs:  Recent Results (from the past 48 hour(s))   Uric Acid    Collection Time: 06/08/22  2:29 PM   Result Value Ref Range    Uric Acid 5.6 2.6 - 6.0 MG/DL   TSH    Collection Time: 06/08/22  2:29 PM   Result Value Ref Range TSH, 3RD GENERATION 1.110 0.358 - 3.740 uIU/mL   proBNP, N-TERMINAL    Collection Time: 06/08/22  2:29 PM   Result Value Ref Range    NT Pro-BNP 1,374 (H) <450 PG/ML   Osmolality, Urine    Collection Time: 06/08/22  3:00 PM   Result Value Ref Range    Osmolality, Ur 498 50 - 1400 MOSM/kg H2O   Sodium, urine, random    Collection Time: 06/08/22  3:00 PM   Result Value Ref Range    SODIUM, RANDOM URINE 12 MMOL/L   Creatinine, Random Urine    Collection Time: 06/08/22  3:00 PM   Result Value Ref Range    Creatinine, Ur 131.00 mg/dL   POCT Glucose    Collection Time: 06/08/22  4:15 PM   Result Value Ref Range    POC Glucose 209 (H) 65 - 100 mg/dL    Performed by:  LAURA Casillas 38    POCT Glucose    Collection Time: 06/08/22  8:28 PM   Result Value Ref Range    POC Glucose 208 (H) 65 - 100 mg/dL    Performed by: EshaT    Cortisol AM, Total    Collection Time: 06/09/22  3:30 AM   Result Value Ref Range    Cortisol - AM 43.3 (H) 7 - 25 ug/dL   CBC with Auto Differential    Collection Time: 06/09/22  3:30 AM   Result Value Ref Range    WBC 24.0 (H) 4.3 - 11.1 K/uL    RBC 2.81 (L) 4.05 - 5.2 M/uL    Hemoglobin 7.1 (L) 11.7 - 15.4 g/dL    Hematocrit 22.6 (L) 35.8 - 46.3 %    MCV 80.4 79.6 - 97.8 FL    MCH 25.3 (L) 26.1 - 32.9 PG    MCHC 31.4 31.4 - 35.0 g/dL    RDW 16.2 (H) 11.9 - 14.6 %    Platelets 266 463 - 199 K/uL    MPV 10.0 9.4 - 12.3 FL    nRBC 0.00 0.0 - 0.2 K/uL    Seg Neutrophils 94 (H) 43 - 78 %    Lymphocytes 2 (L) 13 - 44 %    Monocytes 3 (L) 4.0 - 12.0 %    Eosinophils % 0 (L) 0.5 - 7.8 %    Basophils 0 0.0 - 2.0 %    Immature Granulocytes 1 0.0 - 5.0 %    Segs Absolute 22.6 (H) 1.7 - 8.2 K/UL    Absolute Lymph # 0.5 0.5 - 4.6 K/UL    Absolute Mono # 0.7 0.1 - 1.3 K/UL    Absolute Eos # 0.0 0.0 - 0.8 K/UL    Basophils Absolute 0.0 0.0 - 0.2 K/UL    Absolute Immature Granulocyte 0.2 0.0 - 0.5 K/UL    RBC Comment MODERATE  ANISOCYTOSIS + POIKILOCYTOSIS        RBC Comment SLIGHT  SCHISTOCYTES        RBC Comment SLIGHT  HYPOCHROMIA        RBC Comment OCCASIONAL  OVALOCYTES        RBC Comment OCCASIONAL  SPHEROCYTES        WBC Comment Result Confirmed By Smear      Platelet Comment ADEQUATE      Differential Type AUTOMATED     Procalcitonin    Collection Time: 06/09/22  3:30 AM   Result Value Ref Range    Procalcitonin 6.36 (H) 0.00 - 0.49 ng/mL   Comprehensive Metabolic Panel    Collection Time: 06/09/22  3:30 AM   Result Value Ref Range    Sodium 131 (L) 136 - 145 mmol/L    Potassium 5.2 (H) 3.5 - 5.1 mmol/L    Chloride 97 (L) 98 - 107 mmol/L    CO2 21 21 - 32 mmol/L    Anion Gap 13 7 - 16 mmol/L    Glucose 257 (H) 65 - 100 mg/dL    BUN 36 (H) 8 - 23 MG/DL    CREATININE 1.10 (H) 0.6 - 1.0 MG/DL    GFR African American >60 >60 ml/min/1.73m2    GFR Non- 50 (L) >60 ml/min/1.73m2    Calcium 8.6 8.3 - 10.4 MG/DL    Total Bilirubin 0.6 0.2 - 1.1 MG/DL    ALT 32 12 - 65 U/L    AST 41 (H) 15 - 37 U/L    Alk Phosphatase 75 50 - 136 U/L    Total Protein 5.1 (L) 6.3 - 8.2 g/dL    Albumin 2.2 (L) 3.2 - 4.6 g/dL    Globulin 2.9 2.3 - 3.5 g/dL    Albumin/Globulin Ratio 0.8 (L) 1.2 - 3.5     POCT Glucose    Collection Time: 06/09/22  7:31 AM   Result Value Ref Range    POC Glucose 363 (H) 65 - 100 mg/dL    Performed by: Devi    POCT Glucose    Collection Time: 06/09/22 10:55 AM   Result Value Ref Range    POC Glucose 328 (H) 65 - 100 mg/dL    Performed by: Devi    EKG 12 Lead    Collection Time: 06/09/22  1:08 PM   Result Value Ref Range    Ventricular Rate 157 BPM    Atrial Rate 157 BPM    P-R Interval 144 ms    QRS Duration 58 ms    Q-T Interval 262 ms    QTc Calculation (Bazett) 423 ms    P Axis 45 degrees    R Axis 9 degrees    T Axis 14 degrees    Diagnosis Sinus tachycardia    POCT Glucose    Collection Time: 06/09/22  4:35 PM   Result Value Ref Range    POC Glucose 196 (H) 65 - 100 mg/dL    Performed by:  LAURA Casillas 38    Culture, Blood 1    Collection Time: 06/09/22  8:57 PM Specimen: Blood   Result Value Ref Range    Special Requests RIGHT  FOREARM        Culture NO GROWTH AFTER 9 HOURS     Hemoglobin and Hematocrit    Collection Time: 06/09/22  8:57 PM   Result Value Ref Range    Hemoglobin 6.7 (LL) 11.7 - 15.4 g/dL    Hematocrit 20.7 (L) 35.8 - 46.3 %   POCT Glucose    Collection Time: 06/09/22  9:51 PM   Result Value Ref Range    POC Glucose 252 (H) 65 - 100 mg/dL    Performed by: Janelle    PREPARE RBC (CROSSMATCH), 1 Units    Collection Time: 06/09/22 11:00 PM   Result Value Ref Range    History Check Historical check performed    TYPE AND SCREEN    Collection Time: 06/09/22 11:53 PM   Result Value Ref Range    Crossmatch expiration date 06/12/2022,7824     ABO/Rh O POSITIVE     Antibody Screen NEG     Unit Number [id number]     Product Code Blood Bank RC LR,2     Unit Divison 00     Dispense Status Blood Bank ISSUED     Crossmatch Result Compatible    Vancomycin Level, Random    Collection Time: 06/10/22 12:06 AM   Result Value Ref Range    Vancomycin Rm 19.2 UG/ML   Basic Metabolic Panel    Collection Time: 06/10/22 12:06 AM   Result Value Ref Range    Sodium 134 (L) 136 - 145 mmol/L    Potassium 4.1 3.5 - 5.1 mmol/L    Chloride 103 98 - 107 mmol/L    CO2 19 (L) 21 - 32 mmol/L    Anion Gap 12 7 - 16 mmol/L    Glucose 234 (H) 65 - 100 mg/dL    BUN 40 (H) 8 - 23 MG/DL    CREATININE 0.90 0.6 - 1.0 MG/DL    GFR African American >60 >60 ml/min/1.73m2    GFR Non- >60 >60 ml/min/1.73m2    Calcium 9.0 8.3 - 10.4 MG/DL   CBC    Collection Time: 06/10/22 12:06 AM   Result Value Ref Range    WBC 21.1 (H) 4.3 - 11.1 K/uL    RBC 2.65 (L) 4.05 - 5.2 M/uL    Hemoglobin 6.6 (LL) 11.7 - 15.4 g/dL    Hematocrit 20.8 (L) 35.8 - 46.3 %    MCV 78.5 (L) 79.6 - 97.8 FL    MCH 24.9 (L) 26.1 - 32.9 PG    MCHC 31.7 31.4 - 35.0 g/dL    RDW 16.4 (H) 11.9 - 14.6 %    Platelets 022 958 - 288 K/uL    MPV 10.1 9.4 - 12.3 FL    nRBC 0.02 0.0 - 0.2 K/uL   POCT Glucose Collection Time: 06/10/22 12:30 AM   Result Value Ref Range    POC Glucose 254 (H) 65 - 100 mg/dL    Performed by: Janelle    POCT Glucose    Collection Time: 06/10/22  6:11 AM   Result Value Ref Range    POC Glucose 198 (H) 65 - 100 mg/dL    Performed by: Janelle    CBC with Auto Differential    Collection Time: 06/10/22 10:10 AM   Result Value Ref Range    WBC 21.3 (H) 4.3 - 11.1 K/uL    RBC 3.65 (L) 4.05 - 5.2 M/uL    Hemoglobin 9.1 (L) 11.7 - 15.4 g/dL    Hematocrit 27.7 (L) 35.8 - 46.3 %    MCV 75.9 (L) 79.6 - 97.8 FL    MCH 24.9 (L) 26.1 - 32.9 PG    MCHC 32.9 31.4 - 35.0 g/dL    RDW 16.4 (H) 11.9 - 14.6 %    Platelets 238 (H) 612 - 450 K/uL    MPV 9.7 9.4 - 12.3 FL    nRBC 0.00 0.0 - 0.2 K/uL    Differential Type AUTOMATED      Seg Neutrophils 91 (H) 43 - 78 %    Lymphocytes 2 (L) 13 - 44 %    Monocytes 6 4.0 - 12.0 %    Eosinophils % 0 (L) 0.5 - 7.8 %    Basophils 0 0.0 - 2.0 %    Immature Granulocytes 1 0.0 - 5.0 %    Segs Absolute 19.3 (H) 1.7 - 8.2 K/UL    Absolute Lymph # 0.5 0.5 - 4.6 K/UL    Absolute Mono # 1.3 0.1 - 1.3 K/UL    Absolute Eos # 0.0 0.0 - 0.8 K/UL    Basophils Absolute 0.0 0.0 - 0.2 K/UL    Absolute Immature Granulocyte 0.2 0.0 - 0.5 K/UL   POCT Glucose    Collection Time: 06/10/22 11:23 AM   Result Value Ref Range    POC Glucose 282 (H) 65 - 100 mg/dL    Performed by: Shruti        Other Studies:  XR CHEST 1 VIEW   Final Result   Unchanged bibasilar lung atelectasis or infiltrates and small   pleural effusions. XR CHEST 1 VIEW   Final Result   Unchanged bibasilar lung atelectasis or infiltrates and small   pleural effusions. XR CHEST PORTABLE   Final Result   New airspace opacity at the right lung base. This could represent   developing pneumonia. XR CHEST 1 VIEW   Final Result   Unchanged left lung base atelectasis or contusion and small pleural   effusion. XR CHEST 1 VIEW   Final Result   1. Resolved left pneumothorax.    2. Unchanged left lung base atelectasis or contusion and small pleural   effusions. XR CHEST 1 VIEW   Final Result   1. Decreased tiny left apical pneumothorax. 2. Unchanged left lung base atelectasis or contusion. 3. Small bilateral pleural effusions, unchanged on the left and new on the   right. XR CHEST 1 VIEW   Final Result   1. Slightly enlarged small left apical pneumothorax, in this patient with   multiple left rib fractures. 2. Unchanged left lung base atelectasis or contusion and small left pleural   effusion.       XR CHEST 1 VIEW   Final Result          Current Meds:  Current Facility-Administered Medications   Medication Dose Route Frequency    insulin lispro (HUMALOG) injection vial 0-8 Units  0-8 Units SubCUTAneous 4x Daily AC & HS    cefepime (MAXIPIME) 1000 mg IVPB minibag in NS  1,000 mg IntraVENous Q12H    predniSONE (DELTASONE) tablet 10 mg  10 mg Oral Every Other Day    lidocaine 4 % external patch 2 patch  2 patch TransDERmal Daily    0.9 % sodium chloride infusion   IntraVENous PRN    [Held by provider] 0.9 % sodium chloride infusion   IntraVENous Continuous    azithromycin (ZITHROMAX) 500 mg in sodium chloride 0.9 % 250 mL IVPB ([id number])  500 mg IntraVENous Q24H    naloxone (NARCAN) injection 0.4 mg  0.4 mg IntraVENous PRN    oxyCODONE (ROXICODONE) immediate release tablet 5 mg  5 mg Oral Q8H PRN    vancomycin (VANCOCIN) 750 mg in sodium chloride 0.9 % 250 mL IVPB ([id number])  750 mg IntraVENous Q24H    amLODIPine (NORVASC) tablet 5 mg  5 mg Oral Daily    docusate sodium (COLACE) capsule 100 mg  100 mg Oral Daily    ferrous sulfate (IRON 325) tablet 325 mg  325 mg Oral Daily with breakfast    insulin glargine (LANTUS) injection vial 5 Units  5 Units SubCUTAneous Nightly    metoprolol succinate (TOPROL XL) extended release tablet 50 mg  50 mg Oral Daily    pantoprazole (PROTONIX) tablet 40 mg  40 mg Oral QAM AC    0.9 % sodium chloride infusion   IntraVENous PRN    acetaminophen (TYLENOL) tablet 650 mg  650 mg Oral Q6H PRN    Or    acetaminophen (TYLENOL) suppository 650 mg  650 mg Rectal Q6H PRN    ondansetron (ZOFRAN-ODT) disintegrating tablet 4 mg  4 mg Oral Q8H PRN    Or    ondansetron (ZOFRAN) injection 4 mg  4 mg IntraVENous Q6H PRN    polyethylene glycol (GLYCOLAX) packet 17 g  17 g Oral Daily PRN    albuterol sulfate  (90 Base) MCG/ACT inhaler 2 puff  2 puff Inhalation Q4H PRN    [Held by provider] Vitamin D (CHOLECALCIFEROL) tablet 5,000 Units  5,000 Units Oral Daily    glucose chewable tablet 16 g  4 tablet Oral PRN    dextrose bolus 10% 125 mL  125 mL IntraVENous PRN    Or    dextrose bolus 10% 250 mL  250 mL IntraVENous PRN    glucagon injection 1 mg  1 mg IntraMUSCular PRN    dextrose 5 % solution  100 mL/hr IntraVENous PRN       Signed:  PETRONA Pennington    Part of this note may have been written by using a voice dictation software. The note has been proof read but may still contain some grammatical/other typographical errors.

## 2022-06-10 NOTE — PROGRESS NOTES
[facility] CARDIOLOGY PROGRESS NOTE           6/10/2022 11:38 AM    Admit Date: 6/3/2022      Subjective:   Still tachycardic and complaining of  chest wall pain especially with coughing and deep breathing. Confused and wearing mittens      ROS:  Cardiovascular:  As noted above    Objective:      Vitals:    06/10/22 0413 06/10/22 0517 06/10/22 0720 06/10/22 1101   BP:  (!) 148/83 (!) 145/75 (!) 163/75   Pulse: (!) 106 (!) 116 (!) 111 (!) 119   Resp:  20 20 20   Temp:  97.5 °F (36.4 °C) 97.5 °F (36.4 °C) 97.6 °F (36.4 °C)   TempSrc:  Oral Axillary Axillary   SpO2:  96% 96% 96%   Weight:       Height:           Physical Exam:  General-No Acute Distress  Neck- supple, no JVD  CV- regular rhythm, tachycardic no MRG  Lung-diminished breath sounds in both bases. Appears to be splinting chest wall and not taking big deep breaths. Abd- soft, nontender, nondistended  Ext- no edema bilaterally, has mittens on both hands  Skin- warm and dry    Data Review:     Blood work today showed a sodium of 134, potassium 4.1, chloride 103, creatinine 0.9,  Magnesium was 1.7 at admission and 2.3 on 6/8/2022, TSH was 1.1, proBNP at admission was 1374, white coat was 21,000, hemoglobin 9.1-was 6.6 at midnight today, platelets 681  Assessment/Plan:       Principal Problem:    Pneumothorax, traumatic  -Treatment per primary    Active Problems: Aortic valve stenosis  -Moderate per last echo-monitor-mean gradient was 21 mmHg from echo-December 2021      Aspiration pneumonia ([address]  -Per primary    Tachycardia  -Sinus tachycardia with PACs, on IV metoprolol 2.5 mg every 6 hours. Likely from some amount of underlying inappropriate sinus tachycardia with pain, pneumothorax etc.      Hypertension  -Usually on amlodipine at home-held here in the hospital with borderline low blood pressures.   Pressures trending upwards.  -Restart metoprolol succinate 50 mg by mouth daily      GI bleed    Acute blood loss anemia (ABLA)  -Aspirin on hold. -On Protonix-management per primary  -Transfused with lowest hemoglobin at 6.6-improved to 9.1  -Iron deficiency also noted with serum iron at 10 and TIBC at 279 -low serum iron saturation      Hypomagnesemia  -Has been replaced      Hyponatremia  -Being monitored      Leukocytosis    Type 2 diabetes mellitus ([facility]   -Per primary    Restarted metoprolol succinate 50 mg once daily. Also added low-dose diltiazem 30 mg every 8 hourly as heart rates at times still go up to close to 140 bpm.  -Continue to monitor electrolytes and maintain potassium greater than 4.0 magnesium greater than 2.0.        Pam Gardner MD  6/10/2022 11:38 AM

## 2022-06-11 LAB
ANION GAP SERPL CALC-SCNC: 8 MMOL/L (ref 7–16)
BUN SERPL-MCNC: 43 MG/DL (ref 8–23)
CALCIUM SERPL-MCNC: 8.9 MG/DL (ref 8.3–10.4)
CHLORIDE SERPL-SCNC: 106 MMOL/L (ref 98–107)
CO2 SERPL-SCNC: 24 MMOL/L (ref 21–32)
CREAT SERPL-MCNC: 0.9 MG/DL (ref 0.6–1)
ERYTHROCYTE [DISTWIDTH] IN BLOOD BY AUTOMATED COUNT: 16.6 % (ref 11.9–14.6)
GLUCOSE BLD STRIP.AUTO-MCNC: 178 MG/DL (ref 65–100)
GLUCOSE BLD STRIP.AUTO-MCNC: 181 MG/DL (ref 65–100)
GLUCOSE BLD STRIP.AUTO-MCNC: 212 MG/DL (ref 65–100)
GLUCOSE BLD STRIP.AUTO-MCNC: 375 MG/DL (ref 65–100)
GLUCOSE SERPL-MCNC: 171 MG/DL (ref 65–100)
HCT VFR BLD AUTO: 25.2 % (ref 35.8–46.3)
HGB BLD-MCNC: 8.3 G/DL (ref 11.7–15.4)
MCH RBC QN AUTO: 25 PG (ref 26.1–32.9)
MCHC RBC AUTO-ENTMCNC: 32.9 G/DL (ref 31.4–35)
MCV RBC AUTO: 75.9 FL (ref 79.6–97.8)
MM INDURATION, POC: 0 MM (ref 0–5)
NRBC # BLD: 0 K/UL (ref 0–0.2)
PLATELET # BLD AUTO: 428 K/UL (ref 150–450)
PMV BLD AUTO: 9.6 FL (ref 9.4–12.3)
POTASSIUM SERPL-SCNC: 3.7 MMOL/L (ref 3.5–5.1)
PPD, POC: NEGATIVE
PROCALCITONIN SERPL-MCNC: 5.79 NG/ML (ref 0–0.49)
RBC # BLD AUTO: 3.32 M/UL (ref 4.05–5.2)
SERVICE CMNT-IMP: ABNORMAL
SODIUM SERPL-SCNC: 138 MMOL/L (ref 136–145)
WBC # BLD AUTO: 21 K/UL (ref 4.3–11.1)

## 2022-06-11 PROCEDURE — 6370000000 HC RX 637 (ALT 250 FOR IP): Performed by: STUDENT IN AN ORGANIZED HEALTH CARE EDUCATION/TRAINING PROGRAM

## 2022-06-11 PROCEDURE — 6370000000 HC RX 637 (ALT 250 FOR IP): Performed by: INTERNAL MEDICINE

## 2022-06-11 PROCEDURE — 1100000000 HC RM PRIVATE

## 2022-06-11 PROCEDURE — 82962 GLUCOSE BLOOD TEST: CPT

## 2022-06-11 PROCEDURE — 2580000003 HC RX 258: Performed by: STUDENT IN AN ORGANIZED HEALTH CARE EDUCATION/TRAINING PROGRAM

## 2022-06-11 PROCEDURE — 84145 PROCALCITONIN (PCT): CPT

## 2022-06-11 PROCEDURE — 6370000000 HC RX 637 (ALT 250 FOR IP): Performed by: PHYSICIAN ASSISTANT

## 2022-06-11 PROCEDURE — 36415 COLL VENOUS BLD VENIPUNCTURE: CPT

## 2022-06-11 PROCEDURE — 80048 BASIC METABOLIC PNL TOTAL CA: CPT

## 2022-06-11 PROCEDURE — 6360000002 HC RX W HCPCS: Performed by: STUDENT IN AN ORGANIZED HEALTH CARE EDUCATION/TRAINING PROGRAM

## 2022-06-11 PROCEDURE — 99232 SBSQ HOSP IP/OBS MODERATE 35: CPT | Performed by: INTERNAL MEDICINE

## 2022-06-11 PROCEDURE — 85027 COMPLETE CBC AUTOMATED: CPT

## 2022-06-11 RX ORDER — SODIUM PHOSPHATE, DIBASIC AND SODIUM PHOSPHATE, MONOBASIC 7; 19 G/133ML; G/133ML
1 ENEMA RECTAL
Status: DISPENSED | OUTPATIENT
Start: 2022-06-11 | End: 2022-06-11

## 2022-06-11 RX ORDER — INSULIN GLARGINE 100 [IU]/ML
7 INJECTION, SOLUTION SUBCUTANEOUS NIGHTLY
Status: DISCONTINUED | OUTPATIENT
Start: 2022-06-11 | End: 2022-06-13 | Stop reason: HOSPADM

## 2022-06-11 RX ORDER — POLYETHYLENE GLYCOL 3350 17 G/17G
17 POWDER, FOR SOLUTION ORAL DAILY
Status: DISCONTINUED | OUTPATIENT
Start: 2022-06-11 | End: 2022-06-13 | Stop reason: HOSPADM

## 2022-06-11 RX ORDER — METOPROLOL SUCCINATE 100 MG/1
100 TABLET, EXTENDED RELEASE ORAL DAILY
Status: DISCONTINUED | OUTPATIENT
Start: 2022-06-12 | End: 2022-06-13 | Stop reason: HOSPADM

## 2022-06-11 RX ADMIN — CEFEPIME HYDROCHLORIDE 1000 MG: 1 INJECTION, POWDER, FOR SOLUTION INTRAMUSCULAR; INTRAVENOUS at 22:21

## 2022-06-11 RX ADMIN — METOPROLOL SUCCINATE 50 MG: 50 TABLET, EXTENDED RELEASE ORAL at 09:19

## 2022-06-11 RX ADMIN — DILTIAZEM HYDROCHLORIDE 30 MG: 30 TABLET, FILM COATED ORAL at 06:16

## 2022-06-11 RX ADMIN — FERROUS SULFATE TAB 325 MG (65 MG ELEMENTAL FE) 325 MG: 325 (65 FE) TAB at 09:19

## 2022-06-11 RX ADMIN — INSULIN GLARGINE 7 UNITS: 100 INJECTION, SOLUTION SUBCUTANEOUS at 21:10

## 2022-06-11 RX ADMIN — CEFEPIME HYDROCHLORIDE 1000 MG: 1 INJECTION, POWDER, FOR SOLUTION INTRAMUSCULAR; INTRAVENOUS at 11:00

## 2022-06-11 RX ADMIN — AMLODIPINE BESYLATE 5 MG: 5 TABLET ORAL at 09:18

## 2022-06-11 RX ADMIN — DOCUSATE SODIUM 100 MG: 100 CAPSULE, LIQUID FILLED ORAL at 21:10

## 2022-06-11 RX ADMIN — INSULIN LISPRO 8 UNITS: 100 INJECTION, SOLUTION INTRAVENOUS; SUBCUTANEOUS at 21:11

## 2022-06-11 RX ADMIN — DOCUSATE SODIUM 100 MG: 100 CAPSULE, LIQUID FILLED ORAL at 09:18

## 2022-06-11 RX ADMIN — PANTOPRAZOLE SODIUM 40 MG: 40 TABLET, DELAYED RELEASE ORAL at 06:16

## 2022-06-11 RX ADMIN — NYSTATIN 500000 UNITS: 100000 SUSPENSION ORAL at 21:10

## 2022-06-11 RX ADMIN — DILTIAZEM HYDROCHLORIDE 30 MG: 30 TABLET, FILM COATED ORAL at 13:46

## 2022-06-11 RX ADMIN — NYSTATIN 500000 UNITS: 100000 SUSPENSION ORAL at 16:34

## 2022-06-11 RX ADMIN — POLYETHYLENE GLYCOL 3350 17 G: 17 POWDER, FOR SOLUTION ORAL at 16:34

## 2022-06-11 ASSESSMENT — PAIN SCALES - GENERAL: PAINLEVEL_OUTOF10: 0

## 2022-06-11 NOTE — PROGRESS NOTES
Patient stable with c/o belly pain. Patient did eat a popsicle, some moose, some of her fruit, and drank her ensure along with an ensure clear with miralax. Patient looks and appears better than this AM.  Dressing changed to arm and left to air out. Patient has not had a BM today but has had belly cramping.   Report to be given to oncoming RN 7p-7a

## 2022-06-11 NOTE — PROGRESS NOTES
VANCO DAILY FOLLOW UP NOTE  [facility] Pharmacokinetic Monitoring Service - Vancomycin    Consulting Provider: Dr. Ivonne Dueñas   Indication: HAP  Target Concentration: Goal AUC/EMMA 400-600 mg*hr/L  Day of Therapy: 4 of 7  Additional Antimicrobials: azithromycin, cefepime    Pertinent Laboratory Values: Wt Readings from Last 1 Encounters:   06/03/22 104 lb (47.2 kg)     Temp Readings from Last 1 Encounters:   06/11/22 98.1 °F (36.7 °C) (Oral)     Recent Labs     06/09/22  0330 06/09/22  0330 06/10/22  0006 06/10/22  1010 06/11/22  0311   BUN 36*  --  40*  --  43*   CREATININE 1.10*  --  0.90  --  0.90   WBC 24.0*   < > 21.1* 21.3* 21.0*   PROCAL 6.36*  --   --   --  5.79*    < > = values in this interval not displayed. Estimated Creatinine Clearance: 32 mL/min (based on SCr of 0.9 mg/dL). Lab Results   Component Value Date    VANCORANDOM 19.2 06/10/2022       MRSA Nasal Swab: was ordered by pharmacy, awaiting results. Assessment:  Date/Time Dose Concentration AUC   6/10 0006 750 mg q24h 19.2 464   Note: Serum concentrations collected for AUC dosing may appear elevated if collected in close proximity to the dose administered, this is not necessarily an indication of toxicity    Plan:   Will continue the current regimen of 750 mg every 24 hours for now  Repeat vancomycin concentrations will be ordered as clinically appropriate   Pharmacy will continue to monitor patient and adjust therapy as indicated    Thank you for the consult,  MEGAN Romano [facility]

## 2022-06-11 NOTE — PROGRESS NOTES
Patient resting in bed with family at bedside. Patient resting comfortable with no s/s of pain or discomfort. Call light within reach and patient instructed to call if assistance is needed.

## 2022-06-11 NOTE — PROGRESS NOTES
Hospitalist Progress Note   Admit Date:  6/3/2022  4:48 PM   Name:  Pavel Acosta   Age:  80 y.o. Sex:  female  :  1933   MRN:  [id number]   Room:  3/    Presenting Complaint: No chief complaint on file. Reason(s) for Admission: Traumatic pneumothorax, initial encounter [S27. Andrew Course & Interval History: Pavel Acosta is a 80 y.o. female with medical history of HTN, DM2 who presented to the [facility] ED after a fall. Patient reports becoming dizzy in her bathroom and falling. She denies any LOC or head injury. Patient has had L sided back pain since then. Upon ER evaluation, CXR shows a \"small left apical pneumothorax\" and \"very small left pleural effusion. \"  Additionally, patient's hgb is 7.0. Hemoccult was obtained which was positive. Hospitalist asked to admit. Subsequent cxray revealed worsening / enlarging PTX; pulmonary team requested for pt to be transferred to [facility] for further mgmt. Subjective/24hr Events (22): \"I feel bad. \"  Mildly demented 88y. o. WF sitting up in bed c/o generalized malaise.     Denies CP, dyspnea      Assessment & Plan:     Principal Problem:    Pneumothorax, traumatic  - resolved; no PTX seen on last cxray 6/10/2022    Active Problems:    Aspiration pneumonia ([address]  - aspiration precautions; cleared by speech for oral diet  - de-escalate abx regimen as pt remains afebrile with no supplemental o2 needs and improved procalcitonin levels      Acute hypoxic respiratory failure (resolved)  - required 15L NRB -, suspected secondary to aspiration and some degree of volume overload  - stable on RA      Leukocytosis  - attributed to aspiration PNA but exacerbated by ongoing steroid tx  - pt remains afebrile; procal levels improving / resolving      Anemia / hem + stools  - No active bleed per GI 6/3/2022, no need for EGD  - repeat transfusion 6/10/2022; hgb 6.6 ~> 9.1 ~> 8.3  - monitor; if worsens or requires repeat transfusion will 105 -- -- --   06/10/22 1408 -- (!) 121 -- (!) 149/74 98 %   06/10/22 1101 97.6 °F (36.4 °C) (!) 119 20 (!) 163/75 96 %       Oxygen Therapy  SpO2: 94 %  O2 Device: None (Room air)  Skin Assessment: Clean, dry, & intact  O2 Flow Rate (L/min): 0 L/min    Estimated body mass index is 17.85 kg/m² as calculated from the following:    Height as of this encounter: 5' 4\" (1.626 m). Weight as of this encounter: 104 lb (47.2 kg). Intake/Output Summary (Last 24 hours) at 6/11/2022 1041  Last data filed at 6/10/2022 1833  Gross per 24 hour   Intake 133.75 ml   Output 1300 ml   Net -1166.25 ml         Physical Exam:     Blood pressure 134/70, pulse 99, temperature 98.1 °F (36.7 °C), temperature source Oral, resp. rate 18, height 5' 4\" (1.626 m), weight 104 lb (47.2 kg), SpO2 94 %. General:    Well developed, thin. Head:  Normocephalic, atraumatic  Eyes:  Sclerae appear normal.  Pupils equally round. ENT:  Nares appear normal, no drainage. Moist oral mucosa  Neck:  No restricted ROM. Trachea midline   CV:   RRR, + murmur  Lungs:   Decreased breath sounds b/l; no wheezing / accessory muscles used  Abdomen:   Hypoactive BSx4  Extremities: + mvmt x 4; no edema  Skin:     No rashes and normal coloration. Warm and dry. Neuro:  A&Ox2  Psych:  Normal mood and affect. I have reviewed ordered lab tests and independently visualized imaging below:    Recent Labs:  Recent Results (from the past 48 hour(s))   Kappa/Lambda Quantitative Free Light Chains, Serum    Collection Time: 06/09/22 10:48 AM   Result Value Ref Range    Free Kappa Light Chains 19.9 (H) 3.3 - 19.4 mg/L    Free Lambda Light Chains 16.0 5.7 - 26.3 mg/L    K/L RATIO 1.24 0.26 - 1.65     POCT Glucose    Collection Time: 06/09/22 10:55 AM   Result Value Ref Range    POC Glucose 328 (H) 65 - 100 mg/dL    Performed by:  Devi    EKG 12 Lead    Collection Time: 06/09/22  1:08 PM   Result Value Ref Range    Ventricular Rate 157 BPM    Atrial Rate 157 BPM    P-R Interval 144 ms    QRS Duration 58 ms    Q-T Interval 262 ms    QTc Calculation (Bazett) 423 ms    P Axis 45 degrees    R Axis 9 degrees    T Axis 14 degrees    Diagnosis Sinus tachycardia    POCT Glucose    Collection Time: 06/09/22  4:35 PM   Result Value Ref Range    POC Glucose 196 (H) 65 - 100 mg/dL    Performed by:  LAURA Casillas 38    Culture, Blood 1    Collection Time: 06/09/22  8:57 PM    Specimen: Blood   Result Value Ref Range    Special Requests RIGHT  FOREARM        Culture NO GROWTH 2 DAYS     Hemoglobin and Hematocrit    Collection Time: 06/09/22  8:57 PM   Result Value Ref Range    Hemoglobin 6.7 (LL) 11.7 - 15.4 g/dL    Hematocrit 20.7 (L) 35.8 - 46.3 %   POCT Glucose    Collection Time: 06/09/22  9:51 PM   Result Value Ref Range    POC Glucose 252 (H) 65 - 100 mg/dL    Performed by: Janelle    PREPARE RBC (CROSSMATCH), 1 Units    Collection Time: 06/09/22 11:00 PM   Result Value Ref Range    History Check Historical check performed    TYPE AND SCREEN    Collection Time: 06/09/22 11:53 PM   Result Value Ref Range    Crossmatch expiration date 06/12/2022,2359     ABO/Rh O POSITIVE     Antibody Screen NEG     Unit Number [id number]     Product Code Blood Bank  LR,2     Unit Divison 00     Dispense Status Blood Bank ISSUED     Crossmatch Result Compatible    Culture, Blood 1    Collection Time: 06/10/22 12:06 AM    Specimen: Blood   Result Value Ref Range    Special Requests LEFT  ARM        Culture NO GROWTH 1 DAY     Vancomycin Level, Random    Collection Time: 06/10/22 12:06 AM   Result Value Ref Range    Vancomycin Rm 19.2 UG/ML   Basic Metabolic Panel    Collection Time: 06/10/22 12:06 AM   Result Value Ref Range    Sodium 134 (L) 136 - 145 mmol/L    Potassium 4.1 3.5 - 5.1 mmol/L    Chloride 103 98 - 107 mmol/L    CO2 19 (L) 21 - 32 mmol/L    Anion Gap 12 7 - 16 mmol/L    Glucose 234 (H) 65 - 100 mg/dL    BUN 40 (H) 8 - 23 MG/DL    CREATININE 0.90 0.6 - 1.0 MG/DL    GFR African American >60 >60 ml/min/1.73m2    GFR Non- >60 >60 ml/min/1.73m2    Calcium 9.0 8.3 - 10.4 MG/DL   CBC    Collection Time: 06/10/22 12:06 AM   Result Value Ref Range    WBC 21.1 (H) 4.3 - 11.1 K/uL    RBC 2.65 (L) 4.05 - 5.2 M/uL    Hemoglobin 6.6 (LL) 11.7 - 15.4 g/dL    Hematocrit 20.8 (L) 35.8 - 46.3 %    MCV 78.5 (L) 79.6 - 97.8 FL    MCH 24.9 (L) 26.1 - 32.9 PG    MCHC 31.7 31.4 - 35.0 g/dL    RDW 16.4 (H) 11.9 - 14.6 %    Platelets 674 689 - 136 K/uL    MPV 10.1 9.4 - 12.3 FL    nRBC 0.02 0.0 - 0.2 K/uL   POCT Glucose    Collection Time: 06/10/22 12:30 AM   Result Value Ref Range    POC Glucose 254 (H) 65 - 100 mg/dL    Performed by: Janelle    POCT Glucose    Collection Time: 06/10/22  6:11 AM   Result Value Ref Range    POC Glucose 198 (H) 65 - 100 mg/dL    Performed by: Janelle    CBC with Auto Differential    Collection Time: 06/10/22 10:10 AM   Result Value Ref Range    WBC 21.3 (H) 4.3 - 11.1 K/uL    RBC 3.65 (L) 4.05 - 5.2 M/uL    Hemoglobin 9.1 (L) 11.7 - 15.4 g/dL    Hematocrit 27.7 (L) 35.8 - 46.3 %    MCV 75.9 (L) 79.6 - 97.8 FL    MCH 24.9 (L) 26.1 - 32.9 PG    MCHC 32.9 31.4 - 35.0 g/dL    RDW 16.4 (H) 11.9 - 14.6 %    Platelets 007 (H) 997 - 450 K/uL    MPV 9.7 9.4 - 12.3 FL    nRBC 0.00 0.0 - 0.2 K/uL    Differential Type AUTOMATED      Seg Neutrophils 91 (H) 43 - 78 %    Lymphocytes 2 (L) 13 - 44 %    Monocytes 6 4.0 - 12.0 %    Eosinophils % 0 (L) 0.5 - 7.8 %    Basophils 0 0.0 - 2.0 %    Immature Granulocytes 1 0.0 - 5.0 %    Segs Absolute 19.3 (H) 1.7 - 8.2 K/UL    Absolute Lymph # 0.5 0.5 - 4.6 K/UL    Absolute Mono # 1.3 0.1 - 1.3 K/UL    Absolute Eos # 0.0 0.0 - 0.8 K/UL    Basophils Absolute 0.0 0.0 - 0.2 K/UL    Absolute Immature Granulocyte 0.2 0.0 - 0.5 K/UL   POCT Glucose    Collection Time: 06/10/22 11:23 AM   Result Value Ref Range    POC Glucose 282 (H) 65 - 100 mg/dL    Performed by: Shruti    EKG 12 Lead    Collection Time: 06/10/22  2:15 PM   Result Value Ref Range    Ventricular Rate 148 BPM    Atrial Rate 148 BPM    P-R Interval 134 ms    QRS Duration 62 ms    Q-T Interval 266 ms    QTc Calculation (Bazett) 417 ms    P Axis 45 degrees    R Axis 12 degrees    T Axis 25 degrees    Diagnosis Sinus tachycardia    COVID-19, Rapid    Collection Time: 06/10/22  2:29 PM    Specimen: Nasopharyngeal   Result Value Ref Range    Source NASAL      SARS-CoV-2, Rapid Not detected NOTD     POCT Glucose    Collection Time: 06/10/22  4:19 PM   Result Value Ref Range    POC Glucose 318 (H) 65 - 100 mg/dL    Performed by: Dominique    POCT Glucose    Collection Time: 06/10/22  8:35 PM   Result Value Ref Range    POC Glucose 278 (H) 65 - 100 mg/dL    Performed by: Stephany    Basic Metabolic Panel    Collection Time: 06/11/22  3:11 AM   Result Value Ref Range    Sodium 138 136 - 145 mmol/L    Potassium 3.7 3.5 - 5.1 mmol/L    Chloride 106 98 - 107 mmol/L    CO2 24 21 - 32 mmol/L    Anion Gap 8 7 - 16 mmol/L    Glucose 171 (H) 65 - 100 mg/dL    BUN 43 (H) 8 - 23 MG/DL    CREATININE 0.90 0.6 - 1.0 MG/DL    GFR African American >60 >60 ml/min/1.73m2    GFR Non- >60 >60 ml/min/1.73m2    Calcium 8.9 8.3 - 10.4 MG/DL   CBC    Collection Time: 06/11/22  3:11 AM   Result Value Ref Range    WBC 21.0 (H) 4.3 - 11.1 K/uL    RBC 3.32 (L) 4.05 - 5.2 M/uL    Hemoglobin 8.3 (L) 11.7 - 15.4 g/dL    Hematocrit 25.2 (L) 35.8 - 46.3 %    MCV 75.9 (L) 79.6 - 97.8 FL    MCH 25.0 (L) 26.1 - 32.9 PG    MCHC 32.9 31.4 - 35.0 g/dL    RDW 16.6 (H) 11.9 - 14.6 %    Platelets 329 196 - 640 K/uL    MPV 9.6 9.4 - 12.3 FL    nRBC 0.00 0.0 - 0.2 K/uL   Procalcitonin    Collection Time: 06/11/22  3:11 AM   Result Value Ref Range    Procalcitonin 5.79 (H) 0.00 - 0.49 ng/mL   POCT Glucose    Collection Time: 06/11/22  7:46 AM   Result Value Ref Range    POC Glucose 178 (H) 65 - 100 mg/dL    Performed by:  Ed        Other Studies:  XR ABDOMEN (KUB) (SINGLE AP VIEW)   Final Result   No acute abdominal or pelvic abnormality. Constipation could be   considered. XR CHEST 1 VIEW   Final Result   Unchanged bibasilar lung atelectasis or infiltrates and small   pleural effusions. XR CHEST 1 VIEW   Final Result   Unchanged bibasilar lung atelectasis or infiltrates and small   pleural effusions. XR CHEST PORTABLE   Final Result   New airspace opacity at the right lung base. This could represent   developing pneumonia. XR CHEST 1 VIEW   Final Result   Unchanged left lung base atelectasis or contusion and small pleural   effusion. XR CHEST 1 VIEW   Final Result   1. Resolved left pneumothorax. 2. Unchanged left lung base atelectasis or contusion and small pleural   effusions. XR CHEST 1 VIEW   Final Result   1. Decreased tiny left apical pneumothorax. 2. Unchanged left lung base atelectasis or contusion. 3. Small bilateral pleural effusions, unchanged on the left and new on the   right. XR CHEST 1 VIEW   Final Result   1. Slightly enlarged small left apical pneumothorax, in this patient with   multiple left rib fractures. 2. Unchanged left lung base atelectasis or contusion and small left pleural   effusion.       XR CHEST 1 VIEW   Final Result          Current Meds:  Current Facility-Administered Medications   Medication Dose Route Frequency    insulin lispro (HUMALOG) injection vial 0-8 Units  0-8 Units SubCUTAneous 4x Daily AC & HS    docusate sodium (COLACE) capsule 100 mg  100 mg Oral BID    dilTIAZem (CARDIZEM) tablet 30 mg  30 mg Oral 3 times per day    tuberculin injection 5 Units  5 Units IntraDERmal Once    cefepime (MAXIPIME) 1000 mg IVPB minibag in NS  1,000 mg IntraVENous Q12H    predniSONE (DELTASONE) tablet 10 mg  10 mg Oral Every Other Day    lidocaine 4 % external patch 2 patch  2 patch TransDERmal Daily    0.9 % sodium chloride infusion   IntraVENous PRN    [Held by provider] 0.9 % sodium chloride infusion   IntraVENous Continuous    azithromycin (ZITHROMAX) 500 mg in sodium chloride 0.9 % 250 mL IVPB ([id number])  500 mg IntraVENous Q24H    naloxone (NARCAN) injection 0.4 mg  0.4 mg IntraVENous PRN    oxyCODONE (ROXICODONE) immediate release tablet 5 mg  5 mg Oral Q8H PRN    vancomycin (VANCOCIN) 750 mg in sodium chloride 0.9 % 250 mL IVPB ([id number])  750 mg IntraVENous Q24H    amLODIPine (NORVASC) tablet 5 mg  5 mg Oral Daily    ferrous sulfate (IRON 325) tablet 325 mg  325 mg Oral Daily with breakfast    insulin glargine (LANTUS) injection vial 5 Units  5 Units SubCUTAneous Nightly    metoprolol succinate (TOPROL XL) extended release tablet 50 mg  50 mg Oral Daily    pantoprazole (PROTONIX) tablet 40 mg  40 mg Oral QAM AC    0.9 % sodium chloride infusion   IntraVENous PRN    acetaminophen (TYLENOL) tablet 650 mg  650 mg Oral Q6H PRN    Or    acetaminophen (TYLENOL) suppository 650 mg  650 mg Rectal Q6H PRN    ondansetron (ZOFRAN-ODT) disintegrating tablet 4 mg  4 mg Oral Q8H PRN    Or    ondansetron (ZOFRAN) injection 4 mg  4 mg IntraVENous Q6H PRN    polyethylene glycol (GLYCOLAX) packet 17 g  17 g Oral Daily PRN    albuterol sulfate  (90 Base) MCG/ACT inhaler 2 puff  2 puff Inhalation Q4H PRN    [Held by provider] Vitamin D (CHOLECALCIFEROL) tablet 5,000 Units  5,000 Units Oral Daily    glucose chewable tablet 16 g  4 tablet Oral PRN    dextrose bolus 10% 125 mL  125 mL IntraVENous PRN    Or    dextrose bolus 10% 250 mL  250 mL IntraVENous PRN    glucagon injection 1 mg  1 mg IntraMUSCular PRN    dextrose 5 % solution  100 mL/hr IntraVENous PRN       Signed:  PETRONA Pennington    Part of this note may have been written by using a voice dictation software. The note has been proof read but may still contain some grammatical/other typographical errors.

## 2022-06-11 NOTE — PROGRESS NOTES
Comprehensive Nutrition Assessment    Type and Reason for Visit: Initial,RD Nutrition Re-Screen/LOS  LOS Day 8    Nutrition Recommendations/Plan:   Meals and Snacks:  Diet: Continue current order  Nutrition Supplement Therapy:   Medical food supplement therapy:  Initiate Glucerna Shake three times per day (this provides 220 kcal and 10 grams protein per bottle)     Malnutrition Assessment:  Malnutrition Status: At risk for malnutrition (Comment) (mild wasting, variable po)    Nutrition Assessment:  Nutrition History:      Do You Have Any Cultural, Buddhist, or Ethnic Food Preferences?: No   Nutrition Background:   PMH: GI bleed, DM2, HTN. Patient presented to [facility] after fall with rib fractures and pneumothorax. Patient was transferred to [facility]. Held off on chest tube due to rib fractures and PTX improving. Patient suffered aspiration pneumonia with possible mentation changes evening of 6/8. SLP cleared for puree with feeding assistance on 6/9. Nutrition Interval:  Patient sleeping and no family present to speak with. Spoke with PCT who reports patient refused meal for breakfast and lunch today. Per notes prior to 6/8 patient was able to feed herself and was eating better. Will add ONS    Nutrition Related Findings:   incomplete NFPE, only able to see face, mild temporal wasting Wound Type: None    Current Nutrition Therapies:  ADULT DIET;  Dysphagia - Pureed; 1:1 assistance with all meals    Current Intake:   Average Meal Intake: 1-25% Average Supplements Intake: None Ordered      Anthropometric Measures:  Height: 5' 4\" (162.6 cm)  Current Body Wt: 104 lb 0.9 oz (47.2 kg) (6/2), Weight source: Stated  BMI: 17.9     Ideal Body Weight (Kg) (Calculated): 55 kg (120 lbs), 86.7 %  Usual Body Wt:  (102-106 lbs per EMR), Percent weight change:         Edema: No data recorded  BMI Category Underweight (BMI less than 22) age over 72  Estimated Daily Nutrient Needs:  Energy (kcal/day): 3978-7852 (30-35) (Kcal/kg Weight used: 47.2 kg Current  Protein (g/day): 59-71 (1.25-1.5 g/kg) Weight Used: (Current) 47.2 kg  Fluid (ml/day):   (1 ml/kcal)    Nutrition Diagnosis:   · Inadequate oral intake related to swallowing difficulty as evidenced by intake 0-25% (SLP)    Nutrition Interventions:   Food and/or Nutrient Delivery: Continue Current Diet,Start Oral Nutrition Supplement     Coordination of Nutrition Care: Continue to monitor while inpatient  Plan of Care discussed with: PCT    Goals: Active Goal: Meet at least 75% of estimated needs,by next RD assessment       Nutrition Monitoring and Evaluation:      Food/Nutrient Intake Outcomes: Food and Nutrient Intake,Supplement Intake  Physical Signs/Symptoms Outcomes: Chewing or Swallowing,Weight,Nutrition Focused Physical Findings,Meal Time Behavior    Discharge Planning:     Too soon to determine    Electronically signed by Richard Laryr MS, RD, LD on 6/11/2022 at 1:02 PM.

## 2022-06-11 NOTE — PROGRESS NOTES
[facility] CARDIOLOGY PROGRESS NOTE           6/11/2022 12:40 PM    Admit Date: 6/3/2022      Subjective:     Improved heart rates. Has no complaints. On room air. Telemetry prior with sinus tachycardia    ROS:  Cardiovascular:  As noted above    Objective:      Vitals:    06/11/22 0600 06/11/22 0722 06/11/22 0919 06/11/22 1050   BP: (!) 150/72 134/70 134/70 (!) 140/74   Pulse: 95 99  90   Resp:  18  18   Temp:  98.1 °F (36.7 °C)  97.7 °F (36.5 °C)   TempSrc:  Oral  Oral   SpO2:  94%  97%   Weight:       Height:           Physical Exam:  General-No Acute Distress  Neck- supple, no JVD  CV- regular rate and rhythm no MRG  Lung- clear bilaterally  Abd- soft, nontender, nondistended  Ext- no edema bilaterally. Skin- warm and dry    Data Review:   Recent Labs     06/10/22  0006 06/10/22  0006 06/10/22  1010 06/11/22  0311   *  --   --  138   K 4.1  --   --  3.7   BUN 40*  --   --  43*   WBC 21.1*   < > 21.3* 21.0*   HGB 6.6*   < > 9.1* 8.3*   HCT 20.8*   < > 27.7* 25.2*      < > 451* 428    < > = values in this interval not displayed. Assessment/Plan:     Principal Problem:    Pneumothorax, traumatic  -Not noted on chest x-ray from 6/10/2022    Active Problems:    Aspiration pneumonia ([address]  -Per primary team      GI bleed    Acute blood loss anemia (ABLA)  -Underwent transfusion. Management per primary team.  Hold off aspirin. Hb at 8.3      Hypomagnesemia    Hyponatremia  -Resolved      Acute urinary retention      Tachycardia  -Sinus tachycardia in the setting of anemia/pain/pneumothorax. Compensatory. Would expect rates to improve as acute underlying issues improve. -Appears started on short acting Cardizem. Will discontinue. Increase Toprol-XL. Leukocytosis      Aortic valve stenosis  -Moderate per last echocardiogram from 12/2021. Preserved EF.       Type 2 diabetes mellitus ([address]      Hypertension  -Controlled         Susan Porras MD  6/11/2022 12:40 PM

## 2022-06-12 LAB
ABO + RH BLD: NORMAL
ALBUMIN SERPL-MCNC: 2.2 G/DL (ref 3.2–4.6)
ALBUMIN/GLOB SERPL: 0.6 {RATIO} (ref 1.2–3.5)
ALP SERPL-CCNC: 96 U/L (ref 50–136)
ALT SERPL-CCNC: 31 U/L (ref 12–65)
ANION GAP SERPL CALC-SCNC: 6 MMOL/L (ref 7–16)
AST SERPL-CCNC: 24 U/L (ref 15–37)
BILIRUB DIRECT SERPL-MCNC: 0.2 MG/DL
BILIRUB SERPL-MCNC: 0.5 MG/DL (ref 0.2–1.1)
BLD PROD TYP BPU: NORMAL
BLOOD BANK DISPENSE STATUS: NORMAL
BLOOD GROUP ANTIBODIES SERPL: NORMAL
BPU ID: NORMAL
BUN SERPL-MCNC: 35 MG/DL (ref 8–23)
CALCIUM SERPL-MCNC: 9.1 MG/DL (ref 8.3–10.4)
CHLORIDE SERPL-SCNC: 107 MMOL/L (ref 98–107)
CO2 SERPL-SCNC: 26 MMOL/L (ref 21–32)
CREAT SERPL-MCNC: 0.8 MG/DL (ref 0.6–1)
CROSSMATCH RESULT: NORMAL
ERYTHROCYTE [DISTWIDTH] IN BLOOD BY AUTOMATED COUNT: 16.6 % (ref 11.9–14.6)
GLOBULIN SER CALC-MCNC: 3.4 G/DL (ref 2.3–3.5)
GLUCOSE BLD STRIP.AUTO-MCNC: 132 MG/DL (ref 65–100)
GLUCOSE BLD STRIP.AUTO-MCNC: 259 MG/DL (ref 65–100)
GLUCOSE BLD STRIP.AUTO-MCNC: 291 MG/DL (ref 65–100)
GLUCOSE BLD STRIP.AUTO-MCNC: 398 MG/DL (ref 65–100)
GLUCOSE SERPL-MCNC: 119 MG/DL (ref 65–100)
HCT VFR BLD AUTO: 30.5 % (ref 35.8–46.3)
HGB BLD-MCNC: 9.9 G/DL (ref 11.7–15.4)
LACTATE SERPL-SCNC: 1.2 MMOL/L (ref 0.4–2)
LDH SERPL L TO P-CCNC: 425 U/L (ref 110–210)
MAGNESIUM SERPL-MCNC: 1.9 MG/DL (ref 1.8–2.4)
MCH RBC QN AUTO: 24.8 PG (ref 26.1–32.9)
MCHC RBC AUTO-ENTMCNC: 32.5 G/DL (ref 31.4–35)
MCV RBC AUTO: 76.4 FL (ref 79.6–97.8)
MM INDURATION, POC: 0 MM (ref 0–5)
NRBC # BLD: 0 K/UL (ref 0–0.2)
PLATELET # BLD AUTO: 479 K/UL (ref 150–450)
PMV BLD AUTO: 9.6 FL (ref 9.4–12.3)
POTASSIUM SERPL-SCNC: 3.3 MMOL/L (ref 3.5–5.1)
PPD, POC: NEGATIVE
PROT SERPL-MCNC: 5.6 G/DL (ref 6.3–8.2)
RBC # BLD AUTO: 3.99 M/UL (ref 4.05–5.2)
SERVICE CMNT-IMP: ABNORMAL
SODIUM SERPL-SCNC: 139 MMOL/L (ref 136–145)
SPECIMEN EXP DATE BLD: NORMAL
UNIT DIVISION: 0
WBC # BLD AUTO: 20.3 K/UL (ref 4.3–11.1)

## 2022-06-12 PROCEDURE — 6370000000 HC RX 637 (ALT 250 FOR IP): Performed by: PHYSICIAN ASSISTANT

## 2022-06-12 PROCEDURE — 80076 HEPATIC FUNCTION PANEL: CPT

## 2022-06-12 PROCEDURE — 2580000003 HC RX 258: Performed by: STUDENT IN AN ORGANIZED HEALTH CARE EDUCATION/TRAINING PROGRAM

## 2022-06-12 PROCEDURE — 80048 BASIC METABOLIC PNL TOTAL CA: CPT

## 2022-06-12 PROCEDURE — 6370000000 HC RX 637 (ALT 250 FOR IP): Performed by: INTERNAL MEDICINE

## 2022-06-12 PROCEDURE — 85027 COMPLETE CBC AUTOMATED: CPT

## 2022-06-12 PROCEDURE — 6360000002 HC RX W HCPCS: Performed by: STUDENT IN AN ORGANIZED HEALTH CARE EDUCATION/TRAINING PROGRAM

## 2022-06-12 PROCEDURE — 82962 GLUCOSE BLOOD TEST: CPT

## 2022-06-12 PROCEDURE — 6360000002 HC RX W HCPCS: Performed by: PHYSICIAN ASSISTANT

## 2022-06-12 PROCEDURE — 83735 ASSAY OF MAGNESIUM: CPT

## 2022-06-12 PROCEDURE — 83615 LACTATE (LD) (LDH) ENZYME: CPT

## 2022-06-12 PROCEDURE — 99232 SBSQ HOSP IP/OBS MODERATE 35: CPT | Performed by: INTERNAL MEDICINE

## 2022-06-12 PROCEDURE — 6370000000 HC RX 637 (ALT 250 FOR IP): Performed by: STUDENT IN AN ORGANIZED HEALTH CARE EDUCATION/TRAINING PROGRAM

## 2022-06-12 PROCEDURE — 1100000000 HC RM PRIVATE

## 2022-06-12 PROCEDURE — 83605 ASSAY OF LACTIC ACID: CPT

## 2022-06-12 PROCEDURE — 36415 COLL VENOUS BLD VENIPUNCTURE: CPT

## 2022-06-12 RX ORDER — SODIUM PHOSPHATE, DIBASIC AND SODIUM PHOSPHATE, MONOBASIC 7; 19 G/133ML; G/133ML
1 ENEMA RECTAL
Status: DISPENSED | OUTPATIENT
Start: 2022-06-12 | End: 2022-06-12

## 2022-06-12 RX ORDER — AMLODIPINE BESYLATE 5 MG/1
5 TABLET ORAL ONCE
Status: COMPLETED | OUTPATIENT
Start: 2022-06-12 | End: 2022-06-12

## 2022-06-12 RX ORDER — POTASSIUM CHLORIDE 20 MEQ/1
40 TABLET, EXTENDED RELEASE ORAL ONCE
Status: COMPLETED | OUTPATIENT
Start: 2022-06-12 | End: 2022-06-12

## 2022-06-12 RX ORDER — POTASSIUM CHLORIDE 7.45 MG/ML
10 INJECTION INTRAVENOUS ONCE
Status: COMPLETED | OUTPATIENT
Start: 2022-06-12 | End: 2022-06-12

## 2022-06-12 RX ORDER — AMLODIPINE BESYLATE 10 MG/1
10 TABLET ORAL DAILY
Status: DISCONTINUED | OUTPATIENT
Start: 2022-06-13 | End: 2022-06-13 | Stop reason: HOSPADM

## 2022-06-12 RX ADMIN — AMLODIPINE BESYLATE 5 MG: 5 TABLET ORAL at 11:30

## 2022-06-12 RX ADMIN — CEFEPIME HYDROCHLORIDE 1000 MG: 1 INJECTION, POWDER, FOR SOLUTION INTRAMUSCULAR; INTRAVENOUS at 11:00

## 2022-06-12 RX ADMIN — PHENOL 1 SPRAY: 1.5 LIQUID ORAL at 09:24

## 2022-06-12 RX ADMIN — NYSTATIN 500000 UNITS: 100000 SUSPENSION ORAL at 21:35

## 2022-06-12 RX ADMIN — POLYETHYLENE GLYCOL 3350 17 G: 17 POWDER, FOR SOLUTION ORAL at 09:25

## 2022-06-12 RX ADMIN — INSULIN LISPRO 4 UNITS: 100 INJECTION, SOLUTION INTRAVENOUS; SUBCUTANEOUS at 17:36

## 2022-06-12 RX ADMIN — DOCUSATE SODIUM 100 MG: 100 CAPSULE, LIQUID FILLED ORAL at 09:24

## 2022-06-12 RX ADMIN — POTASSIUM CHLORIDE 10 MEQ: 7.46 INJECTION, SOLUTION INTRAVENOUS at 09:30

## 2022-06-12 RX ADMIN — INSULIN LISPRO 8 UNITS: 100 INJECTION, SOLUTION INTRAVENOUS; SUBCUTANEOUS at 12:02

## 2022-06-12 RX ADMIN — CEFEPIME HYDROCHLORIDE 1000 MG: 1 INJECTION, POWDER, FOR SOLUTION INTRAMUSCULAR; INTRAVENOUS at 22:35

## 2022-06-12 RX ADMIN — NYSTATIN 500000 UNITS: 100000 SUSPENSION ORAL at 11:57

## 2022-06-12 RX ADMIN — NYSTATIN 500000 UNITS: 100000 SUSPENSION ORAL at 16:23

## 2022-06-12 RX ADMIN — METOPROLOL SUCCINATE 100 MG: 100 TABLET, EXTENDED RELEASE ORAL at 09:24

## 2022-06-12 RX ADMIN — FERROUS SULFATE TAB 325 MG (65 MG ELEMENTAL FE) 325 MG: 325 (65 FE) TAB at 09:25

## 2022-06-12 RX ADMIN — DOCUSATE SODIUM 100 MG: 100 CAPSULE, LIQUID FILLED ORAL at 21:35

## 2022-06-12 RX ADMIN — NYSTATIN 500000 UNITS: 100000 SUSPENSION ORAL at 09:24

## 2022-06-12 RX ADMIN — PANTOPRAZOLE SODIUM 40 MG: 40 TABLET, DELAYED RELEASE ORAL at 06:21

## 2022-06-12 RX ADMIN — PREDNISONE 10 MG: 20 TABLET ORAL at 09:24

## 2022-06-12 RX ADMIN — AMLODIPINE BESYLATE 5 MG: 5 TABLET ORAL at 09:24

## 2022-06-12 RX ADMIN — INSULIN LISPRO 4 UNITS: 100 INJECTION, SOLUTION INTRAVENOUS; SUBCUTANEOUS at 22:01

## 2022-06-12 RX ADMIN — INSULIN GLARGINE 7 UNITS: 100 INJECTION, SOLUTION SUBCUTANEOUS at 22:00

## 2022-06-12 RX ADMIN — POTASSIUM CHLORIDE 40 MEQ: 20 TABLET, EXTENDED RELEASE ORAL at 09:30

## 2022-06-12 ASSESSMENT — PAIN DESCRIPTION - LOCATION: LOCATION: ABDOMEN

## 2022-06-12 ASSESSMENT — PAIN DESCRIPTION - FREQUENCY: FREQUENCY: INTERMITTENT

## 2022-06-12 ASSESSMENT — PAIN DESCRIPTION - PAIN TYPE: TYPE: ACUTE PAIN

## 2022-06-12 ASSESSMENT — PAIN SCALES - GENERAL
PAINLEVEL_OUTOF10: 6
PAINLEVEL_OUTOF10: 6

## 2022-06-12 ASSESSMENT — PAIN DESCRIPTION - ONSET: ONSET: GRADUAL

## 2022-06-12 ASSESSMENT — PAIN DESCRIPTION - DESCRIPTORS: DESCRIPTORS: CRAMPING

## 2022-06-12 NOTE — PROGRESS NOTES
Pt alert and oriented times 3. Pt has no complaints of pain or discomfort. Pt is resting in bed. Pt is on room air with even and unlabored respirations. Call light is in place.

## 2022-06-12 NOTE — PROGRESS NOTES
Patient relaxing in recliner with daughter at bedside. Patient had large BM after fleet's enema. Call light within reach and patient instructed to call if assistance is needed.

## 2022-06-12 NOTE — PROGRESS NOTES
Hospitalist Progress Note   Admit Date:  6/3/2022  4:48 PM   Name:  Nitin Cage   Age:  80 y.o. Sex:  female  :  1933   MRN:  [id number]   Room:  3/    Presenting Complaint: No chief complaint on file. Reason(s) for Admission: Traumatic pneumothorax, initial encounter [S27. [place] Course & Interval History: Nitin Cage is a 80 y.o. female with medical history of HTN, DM2 who presented to the [facility] ED after a fall. Patient reports becoming dizzy in her bathroom and falling. She denies any LOC or head injury. Patient has had L sided back pain since then. Upon ER evaluation, CXR shows a \"small left apical pneumothorax\" and \"very small left pleural effusion. \"  Additionally, patient's hgb is 7.0. Hemoccult was obtained which was positive. Hospitalist asked to admit. Subsequent cxray revealed worsening / enlarging PTX; pulmonary team requested for pt to be transferred to [facility] for further mgmt. Subjective/24hr Events (22): \"I am eating. \"  Mildly demented 88y. o. WF sitting up in bed being fed, without acute complaints other than abd pains / cramps    Admits to abd cramps, denies BM, dyspnea, cough, chest pains      Assessment & Plan:     Principal Problem:    Pneumothorax, traumatic  - resolved; last cxray 6/10/2022    Active Problems:    Aspiration pneumonia (HCC)  - aspiration precautions  - plan for total 7-day course empiric cefepime; remains afebrile with improved procal 6.36 ~> 5.79      Acute hypoxic respiratory failure (resolved)      Leukocytosis  - attributed to aspiration PNA but exacerbated by ongoing steroid tx  - pt remains afebrile; procal levels improving / resolving; WBC 21.0 ~> 20.3 this AM      Thrush / odynophagia  - nystatin s/sw  - improved oral intake this AM      Constipation / abd pain  - KUB 6/10 possible constipation, no other abnl  - cont miralax daily; prn enema  - check lactic acid / LDH / LFT for completeness      Anemia / hem + L/min    Estimated body mass index is 17.85 kg/m² as calculated from the following:    Height as of this encounter: 5' 4\" (1.626 m). Weight as of this encounter: 104 lb (47.2 kg). Intake/Output Summary (Last 24 hours) at 6/12/2022 0937  Last data filed at 6/12/2022 0903  Gross per 24 hour   Intake 0 ml   Output 1350 ml   Net -1350 ml         Physical Exam:     Blood pressure (!) 170/74, pulse (!) 116, temperature 97.5 °F (36.4 °C), temperature source Oral, resp. rate 24, height 5' 4\" (1.626 m), weight 104 lb (47.2 kg), SpO2 95 %. General:    Well developed, thin. Head:  Normocephalic, atraumatic  Eyes:  Sclerae appear normal.  Pupils equally round. ENT:  Nares appear normal, no drainage. Moist oral mucosa  Neck:  No restricted ROM. Trachea midline   CV:   RRR, + murmur  Lungs:   Decreased breath sounds b/l; no wheezing / accessory muscles used  Abdomen:   HYPERactive BSx 4 this AM; no guarding  Extremities: + mvmt x 4; 1+ edema b/l LE  Skin:     No rashes and normal coloration. Warm and dry. Neuro:  A&Ox2  Psych:  Normal mood and affect.       I have reviewed ordered lab tests and independently visualized imaging below:    Recent Labs:  Recent Results (from the past 48 hour(s))   CBC with Auto Differential    Collection Time: 06/10/22 10:10 AM   Result Value Ref Range    WBC 21.3 (H) 4.3 - 11.1 K/uL    RBC 3.65 (L) 4.05 - 5.2 M/uL    Hemoglobin 9.1 (L) 11.7 - 15.4 g/dL    Hematocrit 27.7 (L) 35.8 - 46.3 %    MCV 75.9 (L) 79.6 - 97.8 FL    MCH 24.9 (L) 26.1 - 32.9 PG    MCHC 32.9 31.4 - 35.0 g/dL    RDW 16.4 (H) 11.9 - 14.6 %    Platelets 700 (H) 039 - 450 K/uL    MPV 9.7 9.4 - 12.3 FL    nRBC 0.00 0.0 - 0.2 K/uL    Differential Type AUTOMATED      Seg Neutrophils 91 (H) 43 - 78 %    Lymphocytes 2 (L) 13 - 44 %    Monocytes 6 4.0 - 12.0 %    Eosinophils % 0 (L) 0.5 - 7.8 %    Basophils 0 0.0 - 2.0 %    Immature Granulocytes 1 0.0 - 5.0 %    Segs Absolute 19.3 (H) 1.7 - 8.2 K/UL    Absolute Lymph # 0.5 0.5 - 4.6 K/UL    Absolute Mono # 1.3 0.1 - 1.3 K/UL    Absolute Eos # 0.0 0.0 - 0.8 K/UL    Basophils Absolute 0.0 0.0 - 0.2 K/UL    Absolute Immature Granulocyte 0.2 0.0 - 0.5 K/UL   POCT Glucose    Collection Time: 06/10/22 11:23 AM   Result Value Ref Range    POC Glucose 282 (H) 65 - 100 mg/dL    Performed by: Shruti    EKG 12 Lead    Collection Time: 06/10/22  2:15 PM   Result Value Ref Range    Ventricular Rate 148 BPM    Atrial Rate 148 BPM    P-R Interval 134 ms    QRS Duration 62 ms    Q-T Interval 266 ms    QTc Calculation (Bazett) 417 ms    P Axis 45 degrees    R Axis 12 degrees    T Axis 25 degrees    Diagnosis Sinus tachycardia    COVID-19, Rapid    Collection Time: 06/10/22  2:29 PM    Specimen: Nasopharyngeal   Result Value Ref Range    Source NASAL      SARS-CoV-2, Rapid Not detected NOTD     POCT Glucose    Collection Time: 06/10/22  4:19 PM   Result Value Ref Range    POC Glucose 318 (H) 65 - 100 mg/dL    Performed by: Dominique    POCT Glucose    Collection Time: 06/10/22  8:35 PM   Result Value Ref Range    POC Glucose 278 (H) 65 - 100 mg/dL    Performed by: Stephany    Basic Metabolic Panel    Collection Time: 06/11/22  3:11 AM   Result Value Ref Range    Sodium 138 136 - 145 mmol/L    Potassium 3.7 3.5 - 5.1 mmol/L    Chloride 106 98 - 107 mmol/L    CO2 24 21 - 32 mmol/L    Anion Gap 8 7 - 16 mmol/L    Glucose 171 (H) 65 - 100 mg/dL    BUN 43 (H) 8 - 23 MG/DL    CREATININE 0.90 0.6 - 1.0 MG/DL    GFR African American >60 >60 ml/min/1.73m2    GFR Non- >60 >60 ml/min/1.73m2    Calcium 8.9 8.3 - 10.4 MG/DL   CBC    Collection Time: 06/11/22  3:11 AM   Result Value Ref Range    WBC 21.0 (H) 4.3 - 11.1 K/uL    RBC 3.32 (L) 4.05 - 5.2 M/uL    Hemoglobin 8.3 (L) 11.7 - 15.4 g/dL    Hematocrit 25.2 (L) 35.8 - 46.3 %    MCV 75.9 (L) 79.6 - 97.8 FL    MCH 25.0 (L) 26.1 - 32.9 PG    MCHC 32.9 31.4 - 35.0 g/dL    RDW 16.6 (H) 11.9 - 14.6 %    Platelets 389 728 - 575 K/uL    MPV 9.6 9.4 - 12.3 FL    nRBC 0.00 0.0 - 0.2 K/uL   Procalcitonin    Collection Time: 06/11/22  3:11 AM   Result Value Ref Range    Procalcitonin 5.79 (H) 0.00 - 0.49 ng/mL   POCT Glucose    Collection Time: 06/11/22  7:46 AM   Result Value Ref Range    POC Glucose 178 (H) 65 - 100 mg/dL    Performed by: JadeMelintaADRIANNA    POCT Glucose    Collection Time: 06/11/22 11:37 AM   Result Value Ref Range    POC Glucose 181 (H) 65 - 100 mg/dL    Performed by: JadeMelintaADRIANNA    POCT Glucose    Collection Time: 06/11/22  4:52 PM   Result Value Ref Range    POC Glucose 212 (H) 65 - 100 mg/dL    Performed by:  Ed    PLEASE READ & DOCUMENT PPD TEST IN 24 HRS    Collection Time: 06/11/22  6:41 PM   Result Value Ref Range    PPD, (POC) Negative Negative    mm Induration 0 0 - 5 mm   POCT Glucose    Collection Time: 06/11/22  8:36 PM   Result Value Ref Range    POC Glucose 375 (H) 65 - 100 mg/dL    Performed by: Roosevelt    Basic Metabolic Panel    Collection Time: 06/12/22  3:13 AM   Result Value Ref Range    Sodium 139 136 - 145 mmol/L    Potassium 3.3 (L) 3.5 - 5.1 mmol/L    Chloride 107 98 - 107 mmol/L    CO2 26 21 - 32 mmol/L    Anion Gap 6 (L) 7 - 16 mmol/L    Glucose 119 (H) 65 - 100 mg/dL    BUN 35 (H) 8 - 23 MG/DL    CREATININE 0.80 0.6 - 1.0 MG/DL    GFR African American >60 >60 ml/min/1.73m2    GFR Non- >60 >60 ml/min/1.73m2    Calcium 9.1 8.3 - 10.4 MG/DL   CBC    Collection Time: 06/12/22  3:13 AM   Result Value Ref Range    WBC 20.3 (H) 4.3 - 11.1 K/uL    RBC 3.99 (L) 4.05 - 5.2 M/uL    Hemoglobin 9.9 (L) 11.7 - 15.4 g/dL    Hematocrit 30.5 (L) 35.8 - 46.3 %    MCV 76.4 (L) 79.6 - 97.8 FL    MCH 24.8 (L) 26.1 - 32.9 PG    MCHC 32.5 31.4 - 35.0 g/dL    RDW 16.6 (H) 11.9 - 14.6 %    Platelets 365 (H) 465 - 450 K/uL    MPV 9.6 9.4 - 12.3 FL    nRBC 0.00 0.0 - 0.2 K/uL   POCT Glucose    Collection Time: 06/12/22  7:30 AM   Result Value Ref Range    POC Glucose 132 (H) 65 - 100 mg/dL Performed by: Juan        Other Studies:  XR ABDOMEN (KUB) (SINGLE AP VIEW)   Final Result   No acute abdominal or pelvic abnormality. Constipation could be   considered. XR CHEST 1 VIEW   Final Result   Unchanged bibasilar lung atelectasis or infiltrates and small   pleural effusions. XR CHEST 1 VIEW   Final Result   Unchanged bibasilar lung atelectasis or infiltrates and small   pleural effusions. XR CHEST PORTABLE   Final Result   New airspace opacity at the right lung base. This could represent   developing pneumonia. XR CHEST 1 VIEW   Final Result   Unchanged left lung base atelectasis or contusion and small pleural   effusion. XR CHEST 1 VIEW   Final Result   1. Resolved left pneumothorax. 2. Unchanged left lung base atelectasis or contusion and small pleural   effusions. XR CHEST 1 VIEW   Final Result   1. Decreased tiny left apical pneumothorax. 2. Unchanged left lung base atelectasis or contusion. 3. Small bilateral pleural effusions, unchanged on the left and new on the   right. XR CHEST 1 VIEW   Final Result   1. Slightly enlarged small left apical pneumothorax, in this patient with   multiple left rib fractures. 2. Unchanged left lung base atelectasis or contusion and small left pleural   effusion.       XR CHEST 1 VIEW   Final Result          Current Meds:  Current Facility-Administered Medications   Medication Dose Route Frequency    [START ON 6/13/2022] amLODIPine (NORVASC) tablet 10 mg  10 mg Oral Daily    amLODIPine (NORVASC) tablet 5 mg  5 mg Oral Once    insulin glargine (LANTUS) injection vial 7 Units  7 Units SubCUTAneous Nightly    metoprolol succinate (TOPROL XL) extended release tablet 100 mg  100 mg Oral Daily    polyethylene glycol (GLYCOLAX) packet 17 g  17 g Oral Daily    nystatin (MYCOSTATIN) 009622 UNIT/ML suspension 500,000 Units  5 mL Oral 4x Daily    phenol 1.4 % mouth spray 1 spray  1 spray Mouth/Throat Q2H PRN    insulin lispro (HUMALOG) injection vial 0-8 Units  0-8 Units SubCUTAneous 4x Daily AC & HS    docusate sodium (COLACE) capsule 100 mg  100 mg Oral BID    cefepime (MAXIPIME) 1000 mg IVPB minibag in NS  1,000 mg IntraVENous Q12H    predniSONE (DELTASONE) tablet 10 mg  10 mg Oral Every Other Day    lidocaine 4 % external patch 2 patch  2 patch TransDERmal Daily    0.9 % sodium chloride infusion   IntraVENous PRN    [Held by provider] 0.9 % sodium chloride infusion   IntraVENous Continuous    naloxone (NARCAN) injection 0.4 mg  0.4 mg IntraVENous PRN    oxyCODONE (ROXICODONE) immediate release tablet 5 mg  5 mg Oral Q8H PRN    ferrous sulfate (IRON 325) tablet 325 mg  325 mg Oral Daily with breakfast    pantoprazole (PROTONIX) tablet 40 mg  40 mg Oral QAM AC    0.9 % sodium chloride infusion   IntraVENous PRN    acetaminophen (TYLENOL) tablet 650 mg  650 mg Oral Q6H PRN    Or    acetaminophen (TYLENOL) suppository 650 mg  650 mg Rectal Q6H PRN    ondansetron (ZOFRAN-ODT) disintegrating tablet 4 mg  4 mg Oral Q8H PRN    Or    ondansetron (ZOFRAN) injection 4 mg  4 mg IntraVENous Q6H PRN    polyethylene glycol (GLYCOLAX) packet 17 g  17 g Oral Daily PRN    albuterol sulfate  (90 Base) MCG/ACT inhaler 2 puff  2 puff Inhalation Q4H PRN    [Held by provider] Vitamin D (CHOLECALCIFEROL) tablet 5,000 Units  5,000 Units Oral Daily    glucose chewable tablet 16 g  4 tablet Oral PRN    dextrose bolus 10% 125 mL  125 mL IntraVENous PRN    Or    dextrose bolus 10% 250 mL  250 mL IntraVENous PRN    glucagon injection 1 mg  1 mg IntraMUSCular PRN    dextrose 5 % solution  100 mL/hr IntraVENous PRN       Signed:  PETRONA Pennington    Part of this note may have been written by using a voice dictation software. The note has been proof read but may still contain some grammatical/other typographical errors.

## 2022-06-12 NOTE — PROGRESS NOTES
Pt alert and oriented times 3. Pt has no complaints of pain. Pt states she has some stomach discomfort. Pt is on room air with even and unlabored respirations. Call light is in place will continue to monitor.

## 2022-06-12 NOTE — PROGRESS NOTES
Patient resting in bed with no complaints at this time. Patient is alert and orientated with no distress noted. IV intact and patent with no s/s of infection noted. Respirations even and unlabored with heart rate regular. Patient unable to ambulate independently without assistance; needs x1 with walker. Patient has some belly cramping r/t constipation; medication to be given. Bed in low locked position with call light within reach. Patient instructed to call if assistance is needed. Will continue to monitor.

## 2022-06-12 NOTE — PROGRESS NOTES
Patient resting in bed with daughter at bedside. Patient feels much better and denies any more pain. Patient ate soup for dinner and drank her ensure. Patient doing lots better today after having BM. Call light within reach and patient instructed to call if assistance is needed.   Report to be given to oncoming RN 7p-7a

## 2022-06-13 VITALS
WEIGHT: 104 LBS | HEIGHT: 64 IN | RESPIRATION RATE: 18 BRPM | TEMPERATURE: 97.3 F | DIASTOLIC BLOOD PRESSURE: 58 MMHG | OXYGEN SATURATION: 98 % | SYSTOLIC BLOOD PRESSURE: 150 MMHG | BODY MASS INDEX: 17.75 KG/M2 | HEART RATE: 104 BPM

## 2022-06-13 LAB
ANION GAP SERPL CALC-SCNC: 8 MMOL/L (ref 7–16)
BUN SERPL-MCNC: 29 MG/DL (ref 8–23)
CALCIUM SERPL-MCNC: 8.9 MG/DL (ref 8.3–10.4)
CHLORIDE SERPL-SCNC: 107 MMOL/L (ref 98–107)
CO2 SERPL-SCNC: 25 MMOL/L (ref 21–32)
CREAT SERPL-MCNC: 0.7 MG/DL (ref 0.6–1)
ERYTHROCYTE [DISTWIDTH] IN BLOOD BY AUTOMATED COUNT: 16.8 % (ref 11.9–14.6)
GLUCOSE BLD STRIP.AUTO-MCNC: 176 MG/DL (ref 65–100)
GLUCOSE BLD STRIP.AUTO-MCNC: 263 MG/DL (ref 65–100)
GLUCOSE SERPL-MCNC: 186 MG/DL (ref 65–100)
HCT VFR BLD AUTO: 27.8 % (ref 35.8–46.3)
HGB BLD-MCNC: 8.8 G/DL (ref 11.7–15.4)
MCH RBC QN AUTO: 24.9 PG (ref 26.1–32.9)
MCHC RBC AUTO-ENTMCNC: 31.7 G/DL (ref 31.4–35)
MCV RBC AUTO: 78.5 FL (ref 79.6–97.8)
NRBC # BLD: 0 K/UL (ref 0–0.2)
PLATELET # BLD AUTO: 422 K/UL (ref 150–450)
PMV BLD AUTO: 10.3 FL (ref 9.4–12.3)
POTASSIUM SERPL-SCNC: 4.4 MMOL/L (ref 3.5–5.1)
RBC # BLD AUTO: 3.54 M/UL (ref 4.05–5.2)
SERVICE CMNT-IMP: ABNORMAL
SERVICE CMNT-IMP: ABNORMAL
SODIUM SERPL-SCNC: 140 MMOL/L (ref 136–145)
WBC # BLD AUTO: 18.2 K/UL (ref 4.3–11.1)

## 2022-06-13 PROCEDURE — 6370000000 HC RX 637 (ALT 250 FOR IP): Performed by: INTERNAL MEDICINE

## 2022-06-13 PROCEDURE — 94640 AIRWAY INHALATION TREATMENT: CPT

## 2022-06-13 PROCEDURE — 85027 COMPLETE CBC AUTOMATED: CPT

## 2022-06-13 PROCEDURE — 6360000002 HC RX W HCPCS: Performed by: PHYSICIAN ASSISTANT

## 2022-06-13 PROCEDURE — 2500000003 HC RX 250 WO HCPCS: Performed by: HOSPITALIST

## 2022-06-13 PROCEDURE — 2580000003 HC RX 258: Performed by: PHYSICIAN ASSISTANT

## 2022-06-13 PROCEDURE — 36415 COLL VENOUS BLD VENIPUNCTURE: CPT

## 2022-06-13 PROCEDURE — 6370000000 HC RX 637 (ALT 250 FOR IP): Performed by: PHYSICIAN ASSISTANT

## 2022-06-13 PROCEDURE — 82962 GLUCOSE BLOOD TEST: CPT

## 2022-06-13 PROCEDURE — 92526 ORAL FUNCTION THERAPY: CPT

## 2022-06-13 PROCEDURE — 6370000000 HC RX 637 (ALT 250 FOR IP): Performed by: STUDENT IN AN ORGANIZED HEALTH CARE EDUCATION/TRAINING PROGRAM

## 2022-06-13 PROCEDURE — 80048 BASIC METABOLIC PNL TOTAL CA: CPT

## 2022-06-13 PROCEDURE — 94760 N-INVAS EAR/PLS OXIMETRY 1: CPT

## 2022-06-13 RX ORDER — LEVOFLOXACIN 250 MG/1
250 TABLET ORAL DAILY
Status: DISCONTINUED | OUTPATIENT
Start: 2022-06-13 | End: 2022-06-13 | Stop reason: HOSPADM

## 2022-06-13 RX ORDER — FERROUS SULFATE 325(65) MG
325 TABLET ORAL
Qty: 30 TABLET | Refills: 0 | Status: SHIPPED | OUTPATIENT
Start: 2022-06-14 | End: 2022-07-14

## 2022-06-13 RX ORDER — METOPROLOL SUCCINATE 100 MG/1
100 TABLET, EXTENDED RELEASE ORAL DAILY
Qty: 30 TABLET | Refills: 0 | Status: SHIPPED | OUTPATIENT
Start: 2022-06-14 | End: 2022-07-14

## 2022-06-13 RX ORDER — AMLODIPINE BESYLATE 10 MG/1
10 TABLET ORAL DAILY
Qty: 30 TABLET | Refills: 0 | Status: SHIPPED | OUTPATIENT
Start: 2022-06-14 | End: 2022-07-14

## 2022-06-13 RX ORDER — LABETALOL HYDROCHLORIDE 5 MG/ML
10 INJECTION, SOLUTION INTRAVENOUS EVERY 6 HOURS PRN
Status: DISCONTINUED | OUTPATIENT
Start: 2022-06-13 | End: 2022-06-13 | Stop reason: HOSPADM

## 2022-06-13 RX ORDER — LEVOFLOXACIN 250 MG/1
250 TABLET ORAL DAILY
Qty: 5 TABLET | Refills: 0 | Status: SHIPPED | OUTPATIENT
Start: 2022-06-13 | End: 2022-06-18

## 2022-06-13 RX ADMIN — FERROUS SULFATE TAB 325 MG (65 MG ELEMENTAL FE) 325 MG: 325 (65 FE) TAB at 08:50

## 2022-06-13 RX ADMIN — INSULIN LISPRO 4 UNITS: 100 INJECTION, SOLUTION INTRAVENOUS; SUBCUTANEOUS at 11:22

## 2022-06-13 RX ADMIN — LABETALOL HYDROCHLORIDE 10 MG: 5 INJECTION INTRAVENOUS at 04:50

## 2022-06-13 RX ADMIN — CEFEPIME HYDROCHLORIDE 2000 MG: 2 INJECTION, POWDER, FOR SOLUTION INTRAVENOUS at 11:24

## 2022-06-13 RX ADMIN — NYSTATIN 500000 UNITS: 100000 SUSPENSION ORAL at 12:35

## 2022-06-13 RX ADMIN — PANTOPRAZOLE SODIUM 40 MG: 40 TABLET, DELAYED RELEASE ORAL at 06:11

## 2022-06-13 RX ADMIN — AMLODIPINE BESYLATE 10 MG: 10 TABLET ORAL at 08:50

## 2022-06-13 RX ADMIN — LEVOFLOXACIN 250 MG: 250 TABLET, FILM COATED ORAL at 12:35

## 2022-06-13 RX ADMIN — DOCUSATE SODIUM 100 MG: 100 CAPSULE, LIQUID FILLED ORAL at 08:51

## 2022-06-13 RX ADMIN — ALBUTEROL SULFATE 2 PUFF: 90 AEROSOL, METERED RESPIRATORY (INHALATION) at 13:05

## 2022-06-13 RX ADMIN — NYSTATIN 500000 UNITS: 100000 SUSPENSION ORAL at 08:51

## 2022-06-13 RX ADMIN — METOPROLOL SUCCINATE 100 MG: 100 TABLET, EXTENDED RELEASE ORAL at 08:51

## 2022-06-13 RX ADMIN — POLYETHYLENE GLYCOL 3350 17 G: 17 POWDER, FOR SOLUTION ORAL at 08:50

## 2022-06-13 ASSESSMENT — PAIN SCALES - WONG BAKER
WONGBAKER_NUMERICALRESPONSE: 0
WONGBAKER_NUMERICALRESPONSE: 0

## 2022-06-13 NOTE — PROGRESS NOTES
TRANSFER - OUT REPORT:    Verbal report given to Freeman Fall on Lanre Brewer  being transferred to [address] [place] [address] for routine progression of patient care       Report consisted of patient's Situation, Background, Assessment and   Recommendations(SBAR). Information from the following report(s) Nurse Handoff Report, Adult Overview, Intake/Output, MAR, Recent Results and Cardiac Rhythm SR/Sinus Tachy was reviewed with the receiving nurse. Opportunity for questions and clarification was provided.       Patient transported with:  [employer]

## 2022-06-13 NOTE — FLOWSHEET NOTE
Patient on room air. René noted at bedside Safety measures noted. Will continue to monitor per policy.      06/13/22 0706   Handoff   Communication Given Shift Handoff   Handoff Received From PAM Horn   Handoff Communication Face to Face

## 2022-06-13 NOTE — PROGRESS NOTES
Pt is alert and oriented times 3. Pt has no complaints of pain or discomfort. Pt is on room air with even and unlabored respirations. Pt chester is draining and clamped to bed free of loops. Call light is in place.

## 2022-06-13 NOTE — PROGRESS NOTES
Patient discharging via stretcher with [employer] for transport to [address] [place] [address]. Patient in stable condition on room air. Mask in place. No further acute needs noted.

## 2022-06-13 NOTE — DISCHARGE SUMMARY
Hospitalist Discharge Summary   Admit Date:  6/3/2022  4:48 PM   DC Note date: 2022  Name:  Mitchell Park   Age:  80 y.o. Sex:  female  :  1933   MRN:  [id number]   Room:  [facility]/  PCP:  PRINCE Hernandez CNP    Presenting Complaint: No chief complaint on file. Initial Admission Diagnosis: Traumatic pneumothorax, initial encounter [S27. 0XXA]     Problem List for this Hospitalization (present on admission):    Principal Problem:    Pneumothorax, traumatic  Active Problems:    Aspiration pneumonia ([facility]    GI bleed    Traumatic pneumothorax    Acute blood loss anemia (ABLA)    Hypomagnesemia    Hyponatremia    Acute urinary retention    Tachycardia    Leukocytosis    Aortic valve stenosis    Type 2 diabetes mellitus ([facility] [address]    Hypertension  Resolved Problems:    * No resolved hospital problems. *    Did Patient have Sepsis (YES OR NO): No    Hospital Course: Prashant Mcgowan a 80 y. o. female with medical history of HTN, DM2 who presented to the [place] ED after a fall.  Patient reports becoming dizzy in her bathroom and falling.  She denies any LOC or head injury. Lizz Mistry has had L sided back pain since then.  Upon ER evaluation, CXR shows a \"small left apical pneumothorax\" and \"very small left pleural effusion. \"  Additionally, patient's hgb is 7.0.  Hemoccult was obtained which was positive.  Hospitalist asked to admit.  Subsequent cxray revealed worsening / enlarging PTX; pulmonary team requested for pt to be transferred to [facility] for further mgmt. Pt was seen in consultation by Katharina Martinez, nephrology and cardiology during this hospitalization. Pertaining to her anemia and hemoccult + stool, GI recommending outpatient evaluation as she had no overt bleeding and documented by GI that she was not interested in endoscopy at time of evaluation. She did require blood transfusion 6/10/2022 as hgb dropped down to 6.6.   Post transfusion pt's hgb remained stable with hgb range between 8.3 - 9.9.  As no overt bleeding noted and anemia attributed to STAR, pt to resume low-dose ASA on discharge with plans to f/u with GI outpatient and continue iron oral supplementation. Pt's pneumothorax was managed conservatively by pulmonary team.  She had serial cxrays which revealed resolution of PTX by 6/7/2022; pt stabilized on room air and as chest pain from rib fracture improved, pulmonary team signed off. Ms. Robert Mariano did have an acute hypoxic episode on 6/9/2022 and required 15L NRB; cxray revealed a RLL infiltrate and it was suspected she was overmedicated with pain medications and possibly aspirated. Pt was started on empiric cefepime / vanco / zithromax which was de-escalated as pt clinically improved. She was seen in consult by speech who ultimately cleared her for oral diet with adjustments and aspiration precautions. Today on discharge pt will continue oral levaquin 250mg daily x 5 more days. Her leukocytosis is improving 21.3 ~> 21.0 ~> 20.3 ~> 18.2 this AM.  Pt has remained stable on RA. Pt became hyponatremic with Na 135 ~> 129 on 6/8/2022 and mild hyperkalemia of 5.2. She was seen in consult by nephrology. Her electrolytes normalized and nephrology signed off, recommending outpatient f/u with [address] [place] [address] Nephrology 3 weeks post dc with labs prior to appt. Today pt's Na is 140, K+ 4.4, creatinine 0.70. As well on 6/9/2022 pt developed ST @157bpm.  Pt was started on IV metoprolol and low-dose oral Cardizem. As HR became better controlled pt was transitioned to oral metoprolol with appropriate dose adjustment per cardiology. She also has known moderate AVS with mean gradient of 21mmhg. Cardiology felt ST was compensatory in nature with underlying anemia / ptx / rib pain. As pt stabilized, cardiology signed off on 6/12/2022. Ms. Robert Mariano has underlying anti-phospholipid antibody syndrome and is chronically on prednisone 10mg daily.   She will need to f/u with rheumatology outpatient for chewable tablet Take 81 mg by mouth daily      Dulaglutide (TRULICITY) 4.63 SC/5.0DY SOPN Inject 0.75 mg into the skin every 7 days      predniSONE (DELTASONE) 10 MG tablet Take 10 mg by mouth every other day         STOP taking these medications       acetaminophen (TYLENOL) 325 MG tablet Comments:   Reason for Stopping:         vitamin D3 (CHOLECALCIFEROL) 125 MCG (5000 UT) TABS tablet Comments:   Reason for Stopping:               Procedures done this admission:  * No surgery found *    Consults this admission:  IP CONSULT TO PULMONOLOGY  IP CONSULT TO RESPIRATORY CARE  IP WOUND CARE NURSE CONSULT TO EVAL  IP CONSULT TO RESPIRATORY CARE  IP CONSULT TO NEPHROLOGY  IP CONSULT TO PHARMACY  IP CONSULT TO CARDIOLOGY  FOLLOW UP VISIT    Echocardiogram results:  12/02/21    TRANSTHORACIC ECHOCARDIOGRAM (TTE) COMPLETE (CONTRAST/BUBBLE/3D PRN) 12/03/2021  7:46 AM (Final)    Narrative  This is a summary report. The complete report is available in the patient's medical record. If you cannot access the medical record, please contact the sending organization for a detailed fax or copy. · LV: Estimated LVEF is >70%. Normal cavity size. Mild concentric hypertrophy. Hyperdynamic systolic function. Left ventricular diastolic dysfunction. · AV: Aortic valve leaflet calcification present. Aortic valve mean gradient is 21 mmHg. Aortic valve area is 1.6 cm2. Moderate aortic valve stenosis is present. · TV: Mild tricuspid valve regurgitation is present. Right Ventricular Arterial Pressure (RVSP) is 29 mmHg. · LA: Mildly dilated left atrium. · RA: Mildly dilated right atrium. · MV: Mitral valve non-specific thickening. Mild mitral annular calcification. Mild mitral valve regurgitation is present. · PV: Mild pulmonic valve regurgitation is present. Signed by: Shaq Costa MD on 12/3/2021  7:46 AM      Diagnostic Imaging/Tests:   XR CHEST (2 VW)    Result Date: 6/3/2022  Chest X-ray COMPARISON: Chest x-ray 11/19/2011. INDICATION: Follow-up pneumothorax. AP and lateral views of the chest were obtained. Lungs are hypoaerated but otherwise clear. Subcutaneous emphysema noted along the left lateral chest wall. Small left apical pneumothorax is present with multiple new rib fracture deformities involving the lateral left third, fourth, fifth and sixth ribs. No evidence of right pneumothorax. The heart size is normal in size. No pleural effusions. XR CHEST 1 VIEW    Result Date: 6/4/2022  History: Traumatic pneumothorax Exam: portable chest Comparison: 6/3/2022 Findings: Multiple left-sided rib fractures again seen with small left apical pneumothorax. There is a small left pleural effusion. The right lung is clear. No change in the appearance of the mediastinal contour or osseous structures. IMPRESSIONs: Multiple displaced left-sided rib fractures with stable left apical pneumothorax.          Labs: Results:       BMP, Mg, Phos Recent Labs     06/11/22 0311 06/12/22 0313 06/12/22  0853 06/13/22  0325    139  --  140   K 3.7 3.3*  --  4.4    107  --  107   CO2 24 26  --  25   BUN 43* 35*  --  29*   MG  --   --  1.9  --       CBC Recent Labs     06/11/22 0311 06/12/22  0313 06/13/22  0325   WBC 21.0* 20.3* 18.2*   RBC 3.32* 3.99* 3.54*   HGB 8.3* 9.9* 8.8*   HCT 25.2* 30.5* 27.8*    479* 422      LFT Recent Labs     06/12/22  0853   ALT 31   GLOB 3.4      Cardiac Testing No results found for: BNP, CPK, RCK1, CKMB   Coagulation Tests Lab Results   Component Value Date    INR 1.2 06/02/2022      A1c No results found for: HBA1C   Lipid Panel No results found for: CHOL, CHOLPOCT, CHOLX, CHLST, CHOLV, 171526, HDL, HDLC, LDL, LDLC, 059926, VLDLC, VLDL, TGLX, TRIGL   Thyroid Panel No results found for: TSH, T4, FT4     Most Recent UA Lab Results   Component Value Date    MUCUS 0 06/02/2022    UCOM RESULTS VERIFIED MANUALLY 06/02/2022          All Labs from Last 24 Hrs:  Recent Results (from the past 24 hour(s)) POCT Glucose    Collection Time: 06/12/22 11:54 AM   Result Value Ref Range    POC Glucose 398 (H) 65 - 100 mg/dL    Performed by: Juan    POCT Glucose    Collection Time: 06/12/22  4:30 PM   Result Value Ref Range    POC Glucose 259 (H) 65 - 100 mg/dL    Performed by: Juan    PLEASE READ & DOCUMENT PPD TEST IN 48 HRS    Collection Time: 06/12/22  4:56 PM   Result Value Ref Range    PPD, (POC) Negative Negative    mm Induration 0 0 - 5 mm   POCT Glucose    Collection Time: 06/12/22  9:36 PM   Result Value Ref Range    POC Glucose 291 (H) 65 - 100 mg/dL    Performed by: Roosevelt    Basic Metabolic Panel    Collection Time: 06/13/22  3:25 AM   Result Value Ref Range    Sodium 140 136 - 145 mmol/L    Potassium 4.4 3.5 - 5.1 mmol/L    Chloride 107 98 - 107 mmol/L    CO2 25 21 - 32 mmol/L    Anion Gap 8 7 - 16 mmol/L    Glucose 186 (H) 65 - 100 mg/dL    BUN 29 (H) 8 - 23 MG/DL    CREATININE 0.70 0.6 - 1.0 MG/DL    GFR African American >60 >60 ml/min/1.73m2    GFR Non- >60 >60 ml/min/1.73m2    Calcium 8.9 8.3 - 10.4 MG/DL   CBC    Collection Time: 06/13/22  3:25 AM   Result Value Ref Range    WBC 18.2 (H) 4.3 - 11.1 K/uL    RBC 3.54 (L) 4.05 - 5.2 M/uL    Hemoglobin 8.8 (L) 11.7 - 15.4 g/dL    Hematocrit 27.8 (L) 35.8 - 46.3 %    MCV 78.5 (L) 79.6 - 97.8 FL    MCH 24.9 (L) 26.1 - 32.9 PG    MCHC 31.7 31.4 - 35.0 g/dL    RDW 16.8 (H) 11.9 - 14.6 %    Platelets 956 083 - 277 K/uL    MPV 10.3 9.4 - 12.3 FL    nRBC 0.00 0.0 - 0.2 K/uL   POCT Glucose    Collection Time: 06/13/22  7:59 AM   Result Value Ref Range    POC Glucose 176 (H) 65 - 100 mg/dL    Performed by: Juan    POCT Glucose    Collection Time: 06/13/22 11:02 AM   Result Value Ref Range    POC Glucose 263 (H) 65 - 100 mg/dL    Performed by: Diane        Allergies   Allergen Reactions    Morphine And Related Nausea And Vomiting    Oxycodone Nausea And Vomiting    Penicillins Rash    Lidocaine Other (See Comments)     \"Paralysis\"    Statins Other (See Comments)     Severe leg pain    Hydrocodone-Acetaminophen Nausea And Vomiting    Meperidine Nausea And Vomiting     Immunization History   Administered Date(s) Administered    PPD Test 06/10/2022    Pneumococcal Vaccine 11/19/2011       Recent Vital Data:  Patient Vitals for the past 24 hrs:   Temp Pulse Resp BP SpO2   06/13/22 1105 97.3 °F (36.3 °C) 88 20 (!) 150/58 96 %   06/13/22 0851 -- 85 -- (!) 143/76 --   06/13/22 0850 -- -- -- (!) 143/76 --   06/13/22 0757 97.5 °F (36.4 °C) 85 21 (!) 143/76 95 %   06/13/22 0730 -- 97 -- -- 99 %   06/13/22 0545 -- -- -- (!) 151/67 --   06/13/22 0332 97.9 °F (36.6 °C) 92 20 (!) 180/77 99 %   06/12/22 2302 97.2 °F (36.2 °C) 97 18 (!) 150/78 99 %   06/12/22 2044 97.7 °F (36.5 °C) 98 20 (!) 158/66 99 %   06/12/22 1555 98.1 °F (36.7 °C) (!) 103 22 (!) 159/71 99 %   06/12/22 1153 97.7 °F (36.5 °C) (!) 102 22 (!) 155/66 97 %       Oxygen Therapy  SpO2: 96 %  O2 Device: None (Room air)  Skin Assessment: Clean, dry, & intact  O2 Flow Rate (L/min): 0 L/min    Estimated body mass index is 17.85 kg/m² as calculated from the following:    Height as of this encounter: 5' 4\" (1.626 m). Weight as of this encounter: 104 lb (47.2 kg). Intake/Output Summary (Last 24 hours) at 6/13/2022 1136  Last data filed at 6/13/2022 1009  Gross per 24 hour   Intake 120 ml   Output 1150 ml   Net -1030 ml         Physical Exam:  General:          Well developed, thin. Head:               Normocephalic, atraumatic  Eyes:               Sclerae appear normal.  Pupils equally round. ENT:                Nares appear normal, no drainage. Moist oral mucosa  Neck:               No restricted ROM. Trachea midline   CV:                  RRR, + murmur  Lungs:             Decreased breath sounds b/l; no wheezing / accessory muscles used  Abdomen:         +BSx 4; no guarding, non tender  Extremities:     + mvmt x 4; 1+ edema b/l LE  Skin:                No rashes and normal coloration. Warm and dry. Neuro:             A&Ox2  Psych:             Normal mood and affect. Signed:  Venecia Pennington    Part of this note may have been written by using a voice dictation software. The note has been proof read but may still contain some grammatical/other typographical errors.

## 2022-06-13 NOTE — PROGRESS NOTES
Pt alert and oriented times 3. Pt has no complaints of pain at this time. Pt BP elevated 180/77. MD made aware via perfect serve.

## 2022-06-13 NOTE — CARE COORDINATION
MSN, CM:  patient to be discharged to [address] [place] [address] for rehab services. Patient and family agree with this discharge plan. Patient has met all CM milestones for this admission. [facility] to transport patient to facility. 06/13/22 1312   Services At/After Discharge   Transition of Care Consult (CM Consult) SNF   Partner SNF No   Reason Why Partner SNF Not Chosen Location   Services At/After Discharge [facility] (SNF)   Mode of Transport at Discharge [facility]  ([address])   [facility] Transport Time of Discharge 1430   Confirm Follow Up Transport Family   Condition of Participation: Discharge Planning   The Plan for Transition of Care is related to the following treatment goals: Short term rehab to [facility] back to baseline   The Patient and/or Patient Representative was provided with a Choice of Provider? Patient   The Patient and/Or Patient Representative agree with the Discharge Plan? Yes   Freedom of Choice list was provided with basic dialogue that supports the patient's individualized plan of care/goals, treatment preferences, and shares the quality data associated with the providers?   Yes

## 2022-06-13 NOTE — PROGRESS NOTES
Pt alert and oriented times 3. Pt has no complaints of pain or discomfort. Pt is on room air with even and unlabored respirations. Call light is in place.

## 2022-06-13 NOTE — PROGRESS NOTES
aspiration. GENERAL    History of Present Injury/Illness: Ms. Steffanie White  has a past medical history of Asthma, Diabetes ([address] GERD (gastroesophageal reflux disease), Hip fracture, right ([address] Hypertension, and MVP (mitral valve prolapse). . She also  has a past surgical history that includes other surgical history; orthopedic surgery; and Tubal ligation. Chart Reviewed: Yes  Subjective: alert and pleasant. Behavior/Cognition: Cooperative  Communication Observation: Functional  Follows Directions: Simple  Respiratory Status: Room air              Prior Dysphagia History: patient on pureed diet and thin liquids  Patient Complaint: mouth hurts/burning when swallowing. reports feels SOB (100% on room air)-notified RN of patient complaints regarding SOB  Current Diet : Pureed  Current Liquid Diet : Thin  Pain:   Patient c/o pain (reports mouth hurts when swallowing.)                                Hearing: Exceptions to [facility]    Hearing Exceptions: Hard of hearing/hearing concerns  OBJECTIVE    Patient Positioning: Upright in bed   Oral Motor   Labial: No impairment  Dentition: Partials (comment); Upper dentures  Oral Hygiene: Xerostomia (? thrush ?)  Lingual: No impairment  Dentition: Dentures top (lower partial)  Baseline Vocal Quality: Normal  Oropharyngeal Phase:     Assessment Method(s): Palpation;Observation  Vocal Quality: No Impairment  Consistency Presented: Thin;Pureed  How Presented: Self-fed/presented;Spoon;Straw  Bolus Acceptance: No impairment  Bolus Formation/Control: Impaired  Type of Impairment: Delayed;Piecemeal  Propulsion: Delayed (# of seconds)  Oral Residue: None  Initiation of Swallow: Delayed (# of seconds)  Laryngeal Elevation: Functional  Aspiration Signs/Symptoms: None  Pharyngeal Phase Characteristics: Feeling of discomfort;Painful swallow; Poor endurance              PLAN    Duration/Frequency: Continue to follow patient 3x/week for duration of hospitalization and/or until goals met Dysphagia Outcome and Severity Scale (ANI)  Dysphagia Outcome Severity Scale: Level 4: Mild moderate dysphagia- Intermittent supervision/cueing. One - two diet consistencies restricted  Interpretation of Tool: The Dysphagia Outcome and Severity Scale (ANI) is a simple, easy-to-use, 7-point scale developed to systematically rate the functional severity of dysphagia based on objective assessment and make recommendations for diet level, independence level, and type of nutrition. Normal(7), Functional(6), Mild(5), Mild-Moderate(4), Moderate(3), Moderate-Severe(2), Severe(1)    Speech Therapy Prognosis  Prognosis: Good  Prognosis Considerations: Age; Co-Morbidities; Potential    Education: Patient,RN  Patient Education: dysphagia diet, aspiration precautions  Patient Education Response: Verbalizes understanding,Demonstrated understanding    Current Medications:   No current facility-administered medications on file prior to encounter.      Current Outpatient Medications on File Prior to Encounter   Medication Sig Dispense Refill    Multiple Vitamin (MULTIVITAMIN PO) Take 1 tablet by mouth daily      acetaminophen (TYLENOL) 325 MG tablet Take 650 mg by mouth every 8 hours      albuterol sulfate  (90 Base) MCG/ACT inhaler Inhale 2 puffs into the lungs every 4 hours as needed      amLODIPine (NORVASC) 5 MG tablet Take 5 mg by mouth daily      aspirin 81 MG chewable tablet Take 81 mg by mouth daily      vitamin D3 (CHOLECALCIFEROL) 125 MCG (5000 UT) TABS tablet Take 5,000 Units by mouth daily      Dulaglutide (TRULICITY) 1.92 PZ/3.7SF SOPN Inject 0.75 mg into the skin every 7 days      predniSONE (DELTASONE) 10 MG tablet Take 10 mg by mouth every other day         PRECAUTIONS/ALLERGIES: Morphine and related, Oxycodone, Penicillins, Lidocaine, Statins, Hydrocodone-acetaminophen, and Meperidine   Safety Devices in place: Yes  Type of devices: Left in bed,Call light within reach  Restraints Initially in

## 2022-06-14 ENCOUNTER — CARE COORDINATION (OUTPATIENT)
Dept: CARE COORDINATION | Facility: CLINIC | Age: 87
End: 2022-06-14

## 2022-06-14 LAB
BACTERIA SPEC CULT: NORMAL
SERVICE CMNT-IMP: NORMAL

## 2022-06-15 LAB
BACTERIA SPEC CULT: NORMAL
SERVICE CMNT-IMP: NORMAL

## 2022-06-28 ENCOUNTER — CARE COORDINATION (OUTPATIENT)
Dept: CARE COORDINATION | Facility: CLINIC | Age: 87
End: 2022-06-28

## 2022-07-11 ENCOUNTER — CARE COORDINATION (OUTPATIENT)
Dept: CARE COORDINATION | Facility: CLINIC | Age: 87
End: 2022-07-11

## 2022-07-11 NOTE — CARE COORDINATION
[facility] Update Call    2022    Patient: Gurpreet Alvarado Patient : 1933   MRN: [id number]  Reason for Admission: pneumothorax  Discharge Date: 22 RARS: Readmission Risk Score: 17.7 ( )    CTN left VM with ADON for update on d/c dates or tentative dates set after d/c to [facility] with [facility].  CTN to await return call and will call back if not return call       Care Transitions Post Acute Facility Update    Care Transitions Interventions  Post Acute Facility Update

## 2022-07-14 ENCOUNTER — CARE COORDINATION (OUTPATIENT)
Dept: CARE COORDINATION | Facility: CLINIC | Age: 87
End: 2022-07-14

## 2022-07-15 ENCOUNTER — CARE COORDINATION (OUTPATIENT)
Dept: CARE COORDINATION | Facility: CLINIC | Age: 87
End: 2022-07-15

## 2022-07-18 ENCOUNTER — CARE COORDINATION (OUTPATIENT)
Dept: CARE COORDINATION | Facility: CLINIC | Age: 87
End: 2022-07-18

## 2023-03-17 ENCOUNTER — OFFICE VISIT (OUTPATIENT)
Dept: PRIMARY CARE CLINIC | Facility: CLINIC | Age: 88
End: 2023-03-17
Payer: MEDICARE

## 2023-03-17 VITALS
SYSTOLIC BLOOD PRESSURE: 148 MMHG | OXYGEN SATURATION: 98 % | HEIGHT: 61 IN | WEIGHT: 98.6 LBS | BODY MASS INDEX: 18.61 KG/M2 | TEMPERATURE: 97.7 F | HEART RATE: 76 BPM | DIASTOLIC BLOOD PRESSURE: 78 MMHG

## 2023-03-17 DIAGNOSIS — Z53.20 MAMMOGRAM DECLINED: ICD-10-CM

## 2023-03-17 DIAGNOSIS — R35.0 URINARY FREQUENCY: ICD-10-CM

## 2023-03-17 DIAGNOSIS — E11.69 TYPE 2 DIABETES MELLITUS WITH OTHER SPECIFIED COMPLICATION, WITHOUT LONG-TERM CURRENT USE OF INSULIN (HCC): ICD-10-CM

## 2023-03-17 DIAGNOSIS — F41.9 ANXIETY: ICD-10-CM

## 2023-03-17 DIAGNOSIS — Z00.00 MEDICARE ANNUAL WELLNESS VISIT, SUBSEQUENT: Primary | ICD-10-CM

## 2023-03-17 DIAGNOSIS — I65.23 CAROTID ATHEROSCLEROSIS, BILATERAL: ICD-10-CM

## 2023-03-17 DIAGNOSIS — K21.9 GASTROESOPHAGEAL REFLUX DISEASE WITHOUT ESOPHAGITIS: ICD-10-CM

## 2023-03-17 DIAGNOSIS — M51.37 DDD (DEGENERATIVE DISC DISEASE), LUMBOSACRAL: ICD-10-CM

## 2023-03-17 DIAGNOSIS — Z71.2 VISIT FOR REVIEW OF DEXA SCAN: ICD-10-CM

## 2023-03-17 DIAGNOSIS — Z53.20 COLONOSCOPY REFUSED: ICD-10-CM

## 2023-03-17 DIAGNOSIS — F13.20 BENZODIAZEPINE DEPENDENCE (HCC): ICD-10-CM

## 2023-03-17 DIAGNOSIS — R63.6 UNDERWEIGHT: ICD-10-CM

## 2023-03-17 DIAGNOSIS — F19.20 STEROID DEPENDENCE (HCC): ICD-10-CM

## 2023-03-17 DIAGNOSIS — I10 PRIMARY HYPERTENSION: ICD-10-CM

## 2023-03-17 DIAGNOSIS — D64.9 ANEMIA, UNSPECIFIED TYPE: ICD-10-CM

## 2023-03-17 DIAGNOSIS — N18.9 CHRONIC KIDNEY DISEASE, UNSPECIFIED CKD STAGE: ICD-10-CM

## 2023-03-17 LAB
BILIRUBIN, URINE, POC: NEGATIVE
BLOOD URINE, POC: NEGATIVE
GLUCOSE URINE, POC: NEGATIVE
KETONES, URINE, POC: NORMAL
LEUKOCYTE ESTERASE, URINE, POC: NEGATIVE
NITRITE, URINE, POC: NEGATIVE
PH, URINE, POC: 6 (ref 4.6–8)
PROTEIN,URINE, POC: NEGATIVE
SPECIFIC GRAVITY, URINE, POC: 1.01 (ref 1–1.03)
URINALYSIS CLARITY, POC: CLEAR
URINALYSIS COLOR, POC: YELLOW
UROBILINOGEN, POC: NORMAL

## 2023-03-17 PROCEDURE — 99204 OFFICE O/P NEW MOD 45 MIN: CPT | Performed by: FAMILY MEDICINE

## 2023-03-17 PROCEDURE — 81003 URINALYSIS AUTO W/O SCOPE: CPT | Performed by: FAMILY MEDICINE

## 2023-03-17 PROCEDURE — 1123F ACP DISCUSS/DSCN MKR DOCD: CPT | Performed by: FAMILY MEDICINE

## 2023-03-17 PROCEDURE — G0439 PPPS, SUBSEQ VISIT: HCPCS | Performed by: FAMILY MEDICINE

## 2023-03-17 RX ORDER — LORAZEPAM 1 MG/1
1 TABLET ORAL NIGHTLY
COMMUNITY
Start: 2016-02-13 | End: 2023-03-17 | Stop reason: SDUPTHER

## 2023-03-17 RX ORDER — METOPROLOL SUCCINATE 25 MG/1
100 TABLET, EXTENDED RELEASE ORAL DAILY
Qty: 90 TABLET | Refills: 1 | Status: SHIPPED | OUTPATIENT
Start: 2023-03-17

## 2023-03-17 RX ORDER — PREDNISONE 10 MG/1
10 TABLET ORAL DAILY
Qty: 90 TABLET | Refills: 1 | Status: SHIPPED | OUTPATIENT
Start: 2023-03-17

## 2023-03-17 RX ORDER — INSULIN ASPART 100 [IU]/ML
5 INJECTION, SOLUTION INTRAVENOUS; SUBCUTANEOUS
COMMUNITY
End: 2023-03-17

## 2023-03-17 RX ORDER — PANTOPRAZOLE SODIUM 40 MG/1
40 TABLET, DELAYED RELEASE ORAL DAILY
Qty: 90 TABLET | Refills: 1 | Status: SHIPPED | OUTPATIENT
Start: 2023-03-17

## 2023-03-17 RX ORDER — FERROUS SULFATE 325(65) MG
325 TABLET ORAL
Qty: 90 TABLET | Refills: 1 | Status: SHIPPED | OUTPATIENT
Start: 2023-03-17 | End: 2023-04-16

## 2023-03-17 RX ORDER — HYDROCHLOROTHIAZIDE 25 MG/1
25 TABLET ORAL DAILY
COMMUNITY
Start: 2022-12-30 | End: 2023-03-17 | Stop reason: SDUPTHER

## 2023-03-17 RX ORDER — PANTOPRAZOLE SODIUM 40 MG/1
40 TABLET, DELAYED RELEASE ORAL 2 TIMES DAILY
COMMUNITY
Start: 2022-10-08 | End: 2023-03-17 | Stop reason: SDUPTHER

## 2023-03-17 RX ORDER — INSULIN ASPART 100 [IU]/ML
INJECTION, SOLUTION INTRAVENOUS; SUBCUTANEOUS
Qty: 5 ADJUSTABLE DOSE PRE-FILLED PEN SYRINGE | Refills: 3 | Status: SHIPPED | OUTPATIENT
Start: 2023-03-17

## 2023-03-17 RX ORDER — LORAZEPAM 1 MG/1
1 TABLET ORAL NIGHTLY
Qty: 30 TABLET | Refills: 0 | Status: SHIPPED | OUTPATIENT
Start: 2023-03-17 | End: 2023-04-16

## 2023-03-17 RX ORDER — DULAGLUTIDE 0.75 MG/.5ML
0.75 INJECTION, SOLUTION SUBCUTANEOUS
Qty: 12 ADJUSTABLE DOSE PRE-FILLED PEN SYRINGE | Refills: 1 | Status: SHIPPED | OUTPATIENT
Start: 2023-03-17 | End: 2023-04-16

## 2023-03-17 RX ORDER — HYDROCHLOROTHIAZIDE 25 MG/1
25 TABLET ORAL DAILY
Qty: 90 TABLET | Refills: 1 | Status: SHIPPED | OUTPATIENT
Start: 2023-03-17

## 2023-03-17 SDOH — ECONOMIC STABILITY: INCOME INSECURITY: HOW HARD IS IT FOR YOU TO PAY FOR THE VERY BASICS LIKE FOOD, HOUSING, MEDICAL CARE, AND HEATING?: NOT HARD AT ALL

## 2023-03-17 SDOH — ECONOMIC STABILITY: FOOD INSECURITY: WITHIN THE PAST 12 MONTHS, YOU WORRIED THAT YOUR FOOD WOULD RUN OUT BEFORE YOU GOT MONEY TO BUY MORE.: NEVER TRUE

## 2023-03-17 SDOH — ECONOMIC STABILITY: HOUSING INSECURITY
IN THE LAST 12 MONTHS, WAS THERE A TIME WHEN YOU DID NOT HAVE A STEADY PLACE TO SLEEP OR SLEPT IN A SHELTER (INCLUDING NOW)?: NO

## 2023-03-17 SDOH — ECONOMIC STABILITY: FOOD INSECURITY: WITHIN THE PAST 12 MONTHS, THE FOOD YOU BOUGHT JUST DIDN'T LAST AND YOU DIDN'T HAVE MONEY TO GET MORE.: NEVER TRUE

## 2023-03-17 ASSESSMENT — LIFESTYLE VARIABLES
HOW MANY STANDARD DRINKS CONTAINING ALCOHOL DO YOU HAVE ON A TYPICAL DAY: PATIENT DOES NOT DRINK
HOW OFTEN DO YOU HAVE A DRINK CONTAINING ALCOHOL: NEVER

## 2023-03-17 ASSESSMENT — PATIENT HEALTH QUESTIONNAIRE - PHQ9
SUM OF ALL RESPONSES TO PHQ QUESTIONS 1-9: 0
1. LITTLE INTEREST OR PLEASURE IN DOING THINGS: 0
SUM OF ALL RESPONSES TO PHQ QUESTIONS 1-9: 0
SUM OF ALL RESPONSES TO PHQ9 QUESTIONS 1 & 2: 0
SUM OF ALL RESPONSES TO PHQ QUESTIONS 1-9: 0
SUM OF ALL RESPONSES TO PHQ QUESTIONS 1-9: 0
2. FEELING DOWN, DEPRESSED OR HOPELESS: 0

## 2023-03-20 PROBLEM — Z53.20 COLONOSCOPY REFUSED: Status: ACTIVE | Noted: 2023-03-20

## 2023-03-20 PROBLEM — R35.0 URINARY FREQUENCY: Status: ACTIVE | Noted: 2023-03-20

## 2023-03-20 PROBLEM — F19.20 STEROID DEPENDENCE (HCC): Status: ACTIVE | Noted: 2023-03-20

## 2023-03-20 PROBLEM — K21.9 GASTROESOPHAGEAL REFLUX DISEASE WITHOUT ESOPHAGITIS: Status: ACTIVE | Noted: 2023-03-20

## 2023-03-20 PROBLEM — Z53.20 MAMMOGRAM DECLINED: Status: ACTIVE | Noted: 2023-03-20

## 2023-03-20 PROBLEM — D64.9 ANEMIA: Status: ACTIVE | Noted: 2023-03-20

## 2023-03-20 PROBLEM — F13.20 BENZODIAZEPINE DEPENDENCE (HCC): Status: ACTIVE | Noted: 2023-03-20

## 2023-03-20 PROBLEM — M51.37 DDD (DEGENERATIVE DISC DISEASE), LUMBOSACRAL: Status: ACTIVE | Noted: 2023-03-20

## 2023-03-20 PROBLEM — N18.9 CHRONIC KIDNEY DISEASE: Status: ACTIVE | Noted: 2023-03-20

## 2023-03-20 PROBLEM — Z71.2 VISIT FOR REVIEW OF DEXA SCAN: Status: ACTIVE | Noted: 2023-03-20

## 2023-03-20 ASSESSMENT — ENCOUNTER SYMPTOMS
RESPIRATORY NEGATIVE: 1
EYES NEGATIVE: 1
BACK PAIN: 1
GASTROINTESTINAL NEGATIVE: 1
ALLERGIC/IMMUNOLOGIC NEGATIVE: 1

## 2023-03-20 NOTE — PROGRESS NOTES
tablet, R-1Normal  Anemia, unspecified type  Comments:  anemia of chronic disesae-monitor  Orders:  -     Comprehensive Metabolic Panel; Future  -     CBC with Auto Differential; Future  -     Iron; Future  -     Lipid Panel; Future  -     Hemoglobin A1C; Future  -     ferrous sulfate (IRON 325) 325 (65 Fe) MG tablet; Take 1 tablet by mouth daily (with breakfast), Disp-90 tablet, R-1Normal  Underweight  Comments:  advised to increase calories  Anxiety  -     Comprehensive Metabolic Panel; Future  -     CBC with Auto Differential; Future  -     Iron; Future  -     Lipid Panel; Future  -     Hemoglobin A1C; Future  -     LORazepam (ATIVAN) 1 MG tablet; Take 1 tablet by mouth nightly for 30 days. Max Daily Amount: 1 mg, Disp-30 tablet, R-0Normal  -     [facility] (Psychiatry)  Benzodiazepine dependence ([facility]  Comments:  pt on benzo for years-high risk--need help to find min effective ddose  Orders:  -     [facility] (Psychiatry)  Carotid atherosclerosis, bilateral  DDD (degenerative disc disease), lumbosacral  Steroid dependence ([facility] [address]  Comments:  no records to suggest adrenal insufficiency  pt feels severly ill when she has tried weaning off of prednisone  DEXA recommended-pt refuses  on vitamin d  Orders:  -     predniSONE (DELTASONE) 10 MG tablet; Take 1 tablet by mouth daily, Disp-90 tablet, R-1Normal  Colonoscopy refused  Mammogram declined  Visit for review of DEXA scan  Comments:  pt refused dexa scan  in 2023  Urinary frequency  Comments:  urine study-negative  Orders:  -     AMB POC URINALYSIS DIP STICK AUTO W/O MICRO      Recommendations for Preventive Services Due: see orders and patient instructions/AVS.  Recommended screening schedule for the next 5-10 years is provided to the patient in written form: see Patient Instructions/AVS.     Return in about 4 months (around 7/10/2023), or call in 2 weeks with BP readings.

## 2023-03-20 NOTE — PATIENT INSTRUCTIONS
do all the things you used to be able to do. Talking with your doctor about ADLs isn't a test that you either pass or fail. It's just a way to get more information about your health and safety. Follow-up care is a key part of your treatment and safety. Be sure to make and go to all appointments, and call your doctor if you are having problems. It's also a good idea to know your test results and keep a list of the medicines you take. Current as of: June 6, 2022               Content Version: 13.6  © 2006-2023 [employer]. Care instructions adapted under license by [facility] ([facility]). If you have questions about a medical condition or this instruction, always ask your healthcare professional. [address] any warranty or liability for your use of this information. A Healthy Heart: Care Instructions  Your Care Instructions     Coronary artery disease, also called heart disease, occurs when a substance called plaque builds up in the vessels that supply oxygen-rich blood to your heart muscle. This can narrow the blood vessels and reduce blood flow. A heart attack happens when blood flow is completely blocked. A high-fat diet, smoking, and other factors increase the risk of heart disease. Your doctor has found that you have a chance of having heart disease. You can do lots of things to keep your heart healthy. It may not be easy, but you can change your diet, exercise more, and quit smoking. These steps really work to lower your chance of heart disease. Follow-up care is a key part of your treatment and safety. Be sure to make and go to all appointments, and call your doctor if you are having problems. It's also a good idea to know your test results and keep a list of the medicines you take. How can you care for yourself at home? Diet    Use less salt when you cook and eat. This helps lower your blood pressure. Taste food before salting.  Add only a little salt when you think

## 2023-03-28 ENCOUNTER — TELEPHONE (OUTPATIENT)
Dept: PRIMARY CARE CLINIC | Facility: CLINIC | Age: 88
End: 2023-03-28

## 2023-03-29 ENCOUNTER — TELEPHONE (OUTPATIENT)
Dept: PRIMARY CARE CLINIC | Facility: CLINIC | Age: 88
End: 2023-03-29

## 2023-03-29 NOTE — TELEPHONE ENCOUNTER
----- Message from [address] sent at 3/29/2023  3:35 PM EDT -----  Subject: Refill Request    QUESTIONS  Name of Medication? LORazepam (ATIVAN) 1 MG tablet  Patient-reported dosage and instructions? 1 po qhs  How many days do you have left? 0  Preferred Pharmacy? [facility]  Pharmacy phone number (if available)? [phone]  Additional Information for Provider? Pt daughter unsure how many pt has   left. Daughter Monique Villalta wants to know if Dr. Margarita Juarez will give her more if   she runs out before she gets to see psychiarty. ---------------------------------------------------------------------------  --------------  [place] INFO  What is the best way for the office to contact you? OK to leave message on   voicemail  Preferred Call Back Phone Number? [phone]  ---------------------------------------------------------------------------  --------------  SCRIPT ANSWERS  Relationship to Patient? Other  Representative Name? Monique iVllalta  Is the representative on the Communication Release of Information (KATYA)   form in Epic?  Yes

## 2023-03-29 NOTE — TELEPHONE ENCOUNTER
Patient called back she needs the one touch Verio Test Strips,patient request a call back please.     [address]      Thank you

## 2023-04-04 ENCOUNTER — TELEPHONE (OUTPATIENT)
Dept: PRIMARY CARE CLINIC | Facility: CLINIC | Age: 88
End: 2023-04-04

## 2023-04-04 NOTE — TELEPHONE ENCOUNTER
----- Message from Tianna Strong sent at 4/3/2023  4:25 PM EDT -----  Subject: Referral Request    Reason for referral request? For Ativan medication  Provider patient wants to be referred to(if known):     Provider Phone Number(if known):[phone]    Additional Information for Provider? Pt's daughter Isabell Alcantar called Dr. Gutierrez Brochure office today to schedule appt. They stated that they do not   have a referral. Please send referral asap as pt has two weeks of   medication left. Please notify pt when referral is complete so they can be   scheduled. ---------------------------------------------------------------------------  --------------  [facility]    [phone]; OK to leave message on voicemail      I spoke with patient's daughter.

## 2023-04-04 NOTE — TELEPHONE ENCOUNTER
Patient's daughter was notified. Dr. Rasheed Zamora will fill the medication in case she runs out before the appointment with the Psychiatrist.  Per the referral coordinator -the new Psychiatrist does not prescribe ativan. Referral was sent to Dr. Chirag Das.

## 2023-04-19 PROBLEM — Z00.00 MEDICARE ANNUAL WELLNESS VISIT, SUBSEQUENT: Status: RESOLVED | Noted: 2023-03-17 | Resolved: 2023-04-19

## 2023-09-04 DIAGNOSIS — E11.69 TYPE 2 DIABETES MELLITUS WITH OTHER SPECIFIED COMPLICATION, WITHOUT LONG-TERM CURRENT USE OF INSULIN (HCC): ICD-10-CM

## 2023-09-05 RX ORDER — DULAGLUTIDE 0.75 MG/.5ML
INJECTION, SOLUTION SUBCUTANEOUS
Qty: 12 ML | Refills: 0 | OUTPATIENT
Start: 2023-09-05